# Patient Record
Sex: MALE | Race: OTHER | NOT HISPANIC OR LATINO | URBAN - METROPOLITAN AREA
[De-identification: names, ages, dates, MRNs, and addresses within clinical notes are randomized per-mention and may not be internally consistent; named-entity substitution may affect disease eponyms.]

---

## 2017-01-09 ENCOUNTER — EMERGENCY (EMERGENCY)
Facility: HOSPITAL | Age: 77
LOS: 1 days | Discharge: HOME | End: 2017-01-09
Attending: EMERGENCY MEDICINE | Admitting: EMERGENCY MEDICINE
Payer: MEDICARE

## 2017-01-09 VITALS
TEMPERATURE: 98 F | OXYGEN SATURATION: 99 % | SYSTOLIC BLOOD PRESSURE: 171 MMHG | HEART RATE: 82 BPM | RESPIRATION RATE: 16 BRPM | DIASTOLIC BLOOD PRESSURE: 101 MMHG

## 2017-01-09 LAB
ALBUMIN SERPL ELPH-MCNC: 4.1 G/DL — SIGNIFICANT CHANGE UP (ref 3.3–5)
ALP SERPL-CCNC: 58 U/L — SIGNIFICANT CHANGE UP (ref 40–120)
ALT FLD-CCNC: 32 U/L — SIGNIFICANT CHANGE UP (ref 4–41)
AST SERPL-CCNC: 25 U/L — SIGNIFICANT CHANGE UP (ref 4–40)
BASE EXCESS BLDV CALC-SCNC: 3.4 MMOL/L — SIGNIFICANT CHANGE UP
BASOPHILS # BLD AUTO: 0.02 K/UL — SIGNIFICANT CHANGE UP (ref 0–0.2)
BASOPHILS NFR BLD AUTO: 0.2 % — SIGNIFICANT CHANGE UP (ref 0–2)
BILIRUB SERPL-MCNC: 0.6 MG/DL — SIGNIFICANT CHANGE UP (ref 0.2–1.2)
BLOOD GAS VENOUS - CREATININE: 1.59 MG/DL — HIGH (ref 0.5–1.3)
BUN SERPL-MCNC: 22 MG/DL — SIGNIFICANT CHANGE UP (ref 7–23)
CALCIUM SERPL-MCNC: 9.6 MG/DL — SIGNIFICANT CHANGE UP (ref 8.4–10.5)
CHLORIDE BLDV-SCNC: 103 MMOL/L — SIGNIFICANT CHANGE UP (ref 96–108)
CHLORIDE SERPL-SCNC: 100 MMOL/L — SIGNIFICANT CHANGE UP (ref 98–107)
CO2 SERPL-SCNC: 24 MMOL/L — SIGNIFICANT CHANGE UP (ref 22–31)
CREAT SERPL-MCNC: 1.56 MG/DL — HIGH (ref 0.5–1.3)
EOSINOPHIL # BLD AUTO: 0.08 K/UL — SIGNIFICANT CHANGE UP (ref 0–0.5)
EOSINOPHIL NFR BLD AUTO: 0.7 % — SIGNIFICANT CHANGE UP (ref 0–6)
GAS PNL BLDV: 136 MMOL/L — SIGNIFICANT CHANGE UP (ref 136–146)
GLUCOSE BLDV-MCNC: 79 — SIGNIFICANT CHANGE UP (ref 70–99)
GLUCOSE SERPL-MCNC: 83 MG/DL — SIGNIFICANT CHANGE UP (ref 70–99)
HBA1C BLD-MCNC: 6.1 % — HIGH (ref 4–5.6)
HCO3 BLDV-SCNC: 25 MMOL/L — SIGNIFICANT CHANGE UP (ref 20–27)
HCT VFR BLD CALC: 41.8 % — SIGNIFICANT CHANGE UP (ref 39–50)
HCT VFR BLDV CALC: 44.7 % — SIGNIFICANT CHANGE UP (ref 39–51)
HGB BLD-MCNC: 13.6 G/DL — SIGNIFICANT CHANGE UP (ref 13–17)
HGB BLDV-MCNC: 14.6 G/DL — SIGNIFICANT CHANGE UP (ref 13–17)
IMM GRANULOCYTES NFR BLD AUTO: 0.3 % — SIGNIFICANT CHANGE UP (ref 0–1.5)
LACTATE BLDV-MCNC: 1.3 MMOL/L — SIGNIFICANT CHANGE UP (ref 0.5–2)
LYMPHOCYTES # BLD AUTO: 1.68 K/UL — SIGNIFICANT CHANGE UP (ref 1–3.3)
LYMPHOCYTES # BLD AUTO: 14.4 % — SIGNIFICANT CHANGE UP (ref 13–44)
MCHC RBC-ENTMCNC: 29.5 PG — SIGNIFICANT CHANGE UP (ref 27–34)
MCHC RBC-ENTMCNC: 32.5 % — SIGNIFICANT CHANGE UP (ref 32–36)
MCV RBC AUTO: 90.7 FL — SIGNIFICANT CHANGE UP (ref 80–100)
MONOCYTES # BLD AUTO: 1.35 K/UL — HIGH (ref 0–0.9)
MONOCYTES NFR BLD AUTO: 11.6 % — SIGNIFICANT CHANGE UP (ref 2–14)
NEUTROPHILS # BLD AUTO: 8.48 K/UL — HIGH (ref 1.8–7.4)
NEUTROPHILS NFR BLD AUTO: 72.8 % — SIGNIFICANT CHANGE UP (ref 43–77)
PCO2 BLDV: 54 MMHG — HIGH (ref 41–51)
PH BLDV: 7.35 PH — SIGNIFICANT CHANGE UP (ref 7.32–7.43)
PLATELET # BLD AUTO: 217 K/UL — SIGNIFICANT CHANGE UP (ref 150–400)
PMV BLD: 10.7 FL — SIGNIFICANT CHANGE UP (ref 7–13)
PO2 BLDV: 35 MMHG — SIGNIFICANT CHANGE UP (ref 35–40)
POTASSIUM BLDV-SCNC: 4.5 MMOL/L — SIGNIFICANT CHANGE UP (ref 3.4–4.5)
POTASSIUM SERPL-MCNC: 4.8 MMOL/L — SIGNIFICANT CHANGE UP (ref 3.5–5.3)
POTASSIUM SERPL-SCNC: 4.8 MMOL/L — SIGNIFICANT CHANGE UP (ref 3.5–5.3)
PROT SERPL-MCNC: 7.9 G/DL — SIGNIFICANT CHANGE UP (ref 6–8.3)
RBC # BLD: 4.61 M/UL — SIGNIFICANT CHANGE UP (ref 4.2–5.8)
RBC # FLD: 12.6 % — SIGNIFICANT CHANGE UP (ref 10.3–14.5)
SAO2 % BLDV: 57.5 % — LOW (ref 60–85)
SODIUM SERPL-SCNC: 139 MMOL/L — SIGNIFICANT CHANGE UP (ref 135–145)
WBC # BLD: 11.65 K/UL — HIGH (ref 3.8–10.5)
WBC # FLD AUTO: 11.65 K/UL — HIGH (ref 3.8–10.5)

## 2017-01-09 PROCEDURE — 71020: CPT | Mod: 26

## 2017-01-09 PROCEDURE — 99217: CPT | Mod: GC

## 2017-01-09 PROCEDURE — 99218: CPT | Mod: GC

## 2017-01-09 RX ORDER — GABAPENTIN 400 MG/1
100 CAPSULE ORAL AT BEDTIME
Qty: 0 | Refills: 0 | Status: DISCONTINUED | OUTPATIENT
Start: 2017-01-09 | End: 2017-01-13

## 2017-01-09 RX ORDER — ASPIRIN AND DIPYRIDAMOLE 25; 200 MG/1; MG/1
1 CAPSULE, EXTENDED RELEASE ORAL DAILY
Qty: 0 | Refills: 0 | Status: DISCONTINUED | OUTPATIENT
Start: 2017-01-09 | End: 2017-01-13

## 2017-01-09 RX ORDER — ACETAMINOPHEN 500 MG
650 TABLET ORAL ONCE
Qty: 0 | Refills: 0 | Status: COMPLETED | OUTPATIENT
Start: 2017-01-09 | End: 2017-01-09

## 2017-01-09 RX ORDER — SODIUM CHLORIDE 9 MG/ML
1000 INJECTION INTRAMUSCULAR; INTRAVENOUS; SUBCUTANEOUS ONCE
Qty: 0 | Refills: 0 | Status: COMPLETED | OUTPATIENT
Start: 2017-01-09 | End: 2017-01-09

## 2017-01-09 RX ORDER — CEFTRIAXONE 500 MG/1
1 INJECTION, POWDER, FOR SOLUTION INTRAMUSCULAR; INTRAVENOUS ONCE
Qty: 0 | Refills: 0 | Status: COMPLETED | OUTPATIENT
Start: 2017-01-09 | End: 2017-01-09

## 2017-01-09 RX ORDER — ALBUTEROL 90 UG/1
2.5 AEROSOL, METERED ORAL EVERY 6 HOURS
Qty: 0 | Refills: 0 | Status: DISCONTINUED | OUTPATIENT
Start: 2017-01-09 | End: 2017-01-13

## 2017-01-09 RX ORDER — BUDESONIDE AND FORMOTEROL FUMARATE DIHYDRATE 160; 4.5 UG/1; UG/1
1 AEROSOL RESPIRATORY (INHALATION)
Qty: 0 | Refills: 0 | Status: DISCONTINUED | OUTPATIENT
Start: 2017-01-09 | End: 2017-01-13

## 2017-01-09 RX ORDER — KETOCONAZOLE 20 MG/G
1 AEROSOL, FOAM TOPICAL AT BEDTIME
Qty: 0 | Refills: 0 | Status: DISCONTINUED | OUTPATIENT
Start: 2017-01-09 | End: 2017-01-13

## 2017-01-09 RX ORDER — TAMSULOSIN HYDROCHLORIDE 0.4 MG/1
0.4 CAPSULE ORAL AT BEDTIME
Qty: 0 | Refills: 0 | Status: DISCONTINUED | OUTPATIENT
Start: 2017-01-09 | End: 2017-01-13

## 2017-01-09 RX ORDER — AZITHROMYCIN 500 MG/1
500 TABLET, FILM COATED ORAL ONCE
Qty: 0 | Refills: 0 | Status: COMPLETED | OUTPATIENT
Start: 2017-01-09 | End: 2017-01-09

## 2017-01-09 RX ORDER — IPRATROPIUM/ALBUTEROL SULFATE 18-103MCG
3 AEROSOL WITH ADAPTER (GRAM) INHALATION ONCE
Qty: 0 | Refills: 0 | Status: COMPLETED | OUTPATIENT
Start: 2017-01-09 | End: 2017-01-09

## 2017-01-09 RX ADMIN — Medication 650 MILLIGRAM(S): at 21:45

## 2017-01-09 RX ADMIN — Medication 3 MILLILITER(S): at 12:05

## 2017-01-09 RX ADMIN — SODIUM CHLORIDE 1000 MILLILITER(S): 9 INJECTION INTRAMUSCULAR; INTRAVENOUS; SUBCUTANEOUS at 20:30

## 2017-01-09 RX ADMIN — BUDESONIDE AND FORMOTEROL FUMARATE DIHYDRATE 1 PUFF(S): 160; 4.5 AEROSOL RESPIRATORY (INHALATION) at 21:45

## 2017-01-09 RX ADMIN — TAMSULOSIN HYDROCHLORIDE 0.4 MILLIGRAM(S): 0.4 CAPSULE ORAL at 21:45

## 2017-01-09 RX ADMIN — Medication 3 MILLILITER(S): at 08:37

## 2017-01-09 RX ADMIN — Medication 60 MILLIGRAM(S): at 08:59

## 2017-01-09 RX ADMIN — GABAPENTIN 100 MILLIGRAM(S): 400 CAPSULE ORAL at 21:45

## 2017-01-09 RX ADMIN — Medication 10 MILLIGRAM(S): at 17:53

## 2017-01-09 RX ADMIN — ASPIRIN AND DIPYRIDAMOLE 1 CAPSULE(S): 25; 200 CAPSULE, EXTENDED RELEASE ORAL at 17:53

## 2017-01-09 RX ADMIN — AZITHROMYCIN 250 MILLIGRAM(S): 500 TABLET, FILM COATED ORAL at 10:08

## 2017-01-09 RX ADMIN — CEFTRIAXONE 100 GRAM(S): 500 INJECTION, POWDER, FOR SOLUTION INTRAMUSCULAR; INTRAVENOUS at 09:53

## 2017-01-09 RX ADMIN — ALBUTEROL 2.5 MILLIGRAM(S): 90 AEROSOL, METERED ORAL at 17:53

## 2017-01-09 NOTE — ED CDU PROVIDER NOTE - MEDICAL DECISION MAKING DETAILS
77 yo recurrnt coughing copius sputum, no obvious pna on cxr (dw radiologist dR Brennan), but given age, stiven cdu for steroids, abx and then close fu with pmd 77 yo recurrent coughing copious sputum, no obvious pna on cxr (dw radiologist dR Brennan), but given age, stiven cdu for steroids, abx and then close fu with pmd

## 2017-01-09 NOTE — ED ADULT NURSE NOTE - OBJECTIVE STATEMENT
pt received a&ox3, pt c/o productive cough with chest pain on cough, pt states he has hx of asthma and that symptoms feel like usual asthma, states he has an inhaler but symptoms were not relieved by inhaler use, pt appears to be in NAD, vss sating at 99% on room air, pt medicated as per emr, 20 gauge IV placed in left ac ,labs drawn and sent, will continue to monitor. pt received a&ox3, pt c/o productive cough with chest discomfort on cough, pt states he has hx of asthma and that symptoms feel like usual asthma, states he has an inhaler but symptoms were not relieved by inhaler use, pt appears to be in NAD, vss sating at 99% on room air, pt medicated as per emr, 20 gauge IV placed in left ac ,labs drawn and sent, will continue to monitor.

## 2017-01-09 NOTE — ED CDU PROVIDER NOTE - PROGRESS NOTE DETAILS
CDU CANDY Ocampo Progress Note:  Pt endorses improvement in symptoms.  Ambulating in unit without issues.  Pt to continue with neb treatments, steroid, abx.  Pt to be reassessed in AM. CANDY Thompson  Pt  lying in bed comfortably states he had right rib pain. Pain medication ordered.  Pt lung CTA B/L . Will continue to monitor. CANDY Thompson  Pt sleeping comfortable and in NAD. Will continue to monitor. CDU DC note Uche: Pt. states feels better, taking Symbicort with minimal relief and states was improved on Prednisone, took 3d course. Will add albuterol with spacer to regimen and resume steroid course. Pt. NAD this a.m., lungs clear, ambulating easily. No PNA but was begun on abx yest. with plan to cont. Zpack at home. States no hx of prev. PNA but does have inhaler use periodically, more commonly in winter season. Denies tob use. + sputum production. Unclear if COPD physiology, will cont. Zpack.

## 2017-01-09 NOTE — ED PROVIDER NOTE - OBJECTIVE STATEMENT
76 year old male with past medical history of Asthma, Hypertension, presents to the Emergency Department complaining of productive cough of brown sputum. Patient states he's had persistent cough for 3 weeks. Patient presented on Dec 26th to the Emergency Department where he was given DuoNebs and steroids. Improvement with treatment for 3 days but had recurrent cough. Endorses shortness of breath that is consistent with prior asthma, fever at home, and chest pain that is worse with coughing (no substernal chest pain that is radiating or exertionally related). No pulmonary embolism risk factors. Denies edema, diaphoresis, fatigue, lightheadedness, nausea, vomiting, orthopnea, or any other complaints. No smoking history. 76 year old male with past medical history of Asthma, Hypertension, presents to the Emergency Department complaining of productive cough of brown sputum, x 3 weeks. Patient was seen in the ER on 12/26,tretaed for asthma and discharge.  He improved for 3 days and started coughing again.  . He co fever at home and has collected copious white/yellow sputum while in the ED.  He denies chest pain, pulmonary embolism risk factors, edema, diaphoresis, fatigue, lightheadedness, nausea, vomiting, orthopnea, or any other complaints. No smoking history.

## 2017-01-09 NOTE — ED PROVIDER NOTE - NS ED MD SCRIBE ATTENDING SCRIBE SECTIONS
HIV/PAST MEDICAL/SURGICAL/SOCIAL HISTORY/DISPOSITION/REVIEW OF SYSTEMS/VITAL SIGNS( Pullset)/HISTORY OF PRESENT ILLNESS/PHYSICAL EXAM

## 2017-01-09 NOTE — ED PROVIDER NOTE - MEDICAL DECISION MAKING DETAILS
76 year old male presents with likely asthma and bronchitis, but will rule out  Pneumonia. Patient likely to be d/c home given stable vital signs and unremarkable physical exam. However, given that today is patient's 2nd visit for similar symptoms, will offer CDU observation. 76 year old male presents with likely asthma and bronchitis, but will rule out  Pneumonia. Patient likely to be d/c home given stable vital signs and unremarkable physical exam. However, given that today is patient's 2nd visit for similar symptoms, will offer CDU observation, duonebs, steroids, possibly dc later today

## 2017-01-09 NOTE — ED ADULT NURSE NOTE - CHIEF COMPLAINT QUOTE
Pt brought in by [facility] c/o a productive cough and brownish phlegm. Pt reports chest discomfort with cough. pt states he was seen here for the same last week. Hx of asthma

## 2017-01-09 NOTE — ED CDU PROVIDER NOTE - ATTENDING CONTRIBUTION TO CARE
I performed the initial face to face bedside interview with this patient regarding history of present illness, review of symptoms and past medical, social and family history and independent physical examination alongside the physician assistant resident.  I wrote the chart and discussed the case, impression, and plan with the resident and patient.

## 2017-01-09 NOTE — ED CDU PROVIDER NOTE - OBJECTIVE STATEMENT
76 year old male with past medical history of Asthma, Hypertension, presents to the Emergency Department complaining of productive cough of brown sputum, x 3 weeks. Patient was seen in the ER on 12/26,tretaed for asthma and discharge.  He improved for 3 days and started coughing again.  . He co fever at home and has collected copious white/yellow sputum while in the ED.  He denies chest pain, pulmonary embolism risk factors, edema, diaphoresis, fatigue, lightheadedness, nausea, vomiting, orthopnea, or any other complaints. No smoking history.

## 2017-01-09 NOTE — ED ADULT TRIAGE NOTE - CHIEF COMPLAINT QUOTE
Pt brought in by [facility] c/o a productive cough and brownish phlegm. Pt reports chest discomfort with cough. pt states he was seen here for the same last week. Hx of asthma

## 2017-01-09 NOTE — ED CDU PROVIDER NOTE - PLAN OF CARE
f/u with PMD post hospital visit within hrs taking all results from the ER to be reviewed. Continue Symbicort as directed as well as Azithromycin 250mg, 1 tab daily for three more days starting tomorrow. In addition, complete Prednisone 50mg once daily for five more days starting tomorrow. If needed for cough/brochospasms, take the albuterol inhaler 2 puffs every 4-6hrs. If any persistent fever, chills, shortness of breath, persistent worsening concerning or new signs or symptoms return to the ER. Follow up with your  PMD post hospital visit within hrs taking all results from the ER to be reviewed. Continue Symbicort as directed as well as Azithromycin 250mg, 1 tab daily for three more days starting tomorrow. In addition, complete Prednisone 50mg once daily for five more days starting tomorrow. If needed for cough/brochospasms, take the albuterol inhaler 2 puffs every 4-6hrs. If any persistent fever, chills, shortness of breath, persistent worsening concerning or new signs or symptoms return to the ER.

## 2017-01-10 VITALS
RESPIRATION RATE: 18 BRPM | HEART RATE: 79 BPM | DIASTOLIC BLOOD PRESSURE: 67 MMHG | OXYGEN SATURATION: 96 % | SYSTOLIC BLOOD PRESSURE: 118 MMHG | TEMPERATURE: 98 F

## 2017-01-10 LAB
SPECIMEN SOURCE: SIGNIFICANT CHANGE UP
SPECIMEN SOURCE: SIGNIFICANT CHANGE UP

## 2017-01-10 RX ORDER — ALBUTEROL 90 UG/1
1 AEROSOL, METERED ORAL ONCE
Qty: 0 | Refills: 0 | Status: COMPLETED | OUTPATIENT
Start: 2017-01-10 | End: 2017-01-10

## 2017-01-10 RX ORDER — AZITHROMYCIN 500 MG/1
250 TABLET, FILM COATED ORAL ONCE
Qty: 0 | Refills: 0 | Status: COMPLETED | OUTPATIENT
Start: 2017-01-10 | End: 2017-01-10

## 2017-01-10 RX ORDER — AZITHROMYCIN 500 MG/1
250 TABLET, FILM COATED ORAL
Qty: 3 | Refills: 0 | OUTPATIENT
Start: 2017-01-10 | End: 2017-01-13

## 2017-01-10 RX ADMIN — ALBUTEROL 2.5 MILLIGRAM(S): 90 AEROSOL, METERED ORAL at 06:08

## 2017-01-10 RX ADMIN — Medication 10 MILLIGRAM(S): at 06:08

## 2017-01-10 RX ADMIN — AZITHROMYCIN 250 MILLIGRAM(S): 500 TABLET, FILM COATED ORAL at 09:56

## 2017-01-10 RX ADMIN — ALBUTEROL 1 PUFF(S): 90 AEROSOL, METERED ORAL at 10:11

## 2017-01-10 RX ADMIN — Medication 60 MILLIGRAM(S): at 09:55

## 2017-01-10 RX ADMIN — ALBUTEROL 2.5 MILLIGRAM(S): 90 AEROSOL, METERED ORAL at 00:08

## 2017-01-13 ENCOUNTER — APPOINTMENT (OUTPATIENT)
Dept: PULMONOLOGY | Facility: CLINIC | Age: 77
End: 2017-01-13

## 2017-01-13 VITALS
OXYGEN SATURATION: 95 % | BODY MASS INDEX: 28.28 KG/M2 | WEIGHT: 202 LBS | HEART RATE: 77 BPM | SYSTOLIC BLOOD PRESSURE: 136 MMHG | HEIGHT: 71 IN | DIASTOLIC BLOOD PRESSURE: 80 MMHG

## 2017-01-13 DIAGNOSIS — R06.02 SHORTNESS OF BREATH: ICD-10-CM

## 2017-01-13 DIAGNOSIS — N40.0 BENIGN PROSTATIC HYPERPLASIA WITHOUT LOWER URINARY TRACT SYMPMS: ICD-10-CM

## 2017-01-13 DIAGNOSIS — Z86.79 PERSONAL HISTORY OF OTHER DISEASES OF THE CIRCULATORY SYSTEM: ICD-10-CM

## 2017-01-14 LAB
BACTERIA BLD CULT: SIGNIFICANT CHANGE UP
BACTERIA BLD CULT: SIGNIFICANT CHANGE UP

## 2017-01-16 PROBLEM — R06.02 SHORTNESS OF BREATH: Status: ACTIVE | Noted: 2017-01-13

## 2017-01-16 PROBLEM — N40.0 ENLARGED PROSTATE: Status: RESOLVED | Noted: 2017-01-16 | Resolved: 2017-01-16

## 2017-01-16 PROBLEM — Z86.79 HISTORY OF HYPERTENSION: Status: RESOLVED | Noted: 2017-01-13 | Resolved: 2017-01-16

## 2017-01-16 RX ORDER — ENALAPRIL MALEATE 10 MG/1
10 TABLET ORAL
Qty: 180 | Refills: 0 | Status: ACTIVE | COMMUNITY
Start: 2016-08-15

## 2017-01-16 RX ORDER — PREDNISONE 50 MG/1
50 TABLET ORAL
Qty: 5 | Refills: 0 | Status: DISCONTINUED | COMMUNITY
Start: 2017-01-10 | End: 2017-01-16

## 2017-01-16 RX ORDER — ALBUTEROL SULFATE 90 UG/1
108 (90 BASE) AEROSOL, METERED RESPIRATORY (INHALATION)
Qty: 18 | Refills: 0 | Status: ACTIVE | COMMUNITY
Start: 2016-12-23

## 2017-01-16 RX ORDER — AZITHROMYCIN 250 MG/1
250 TABLET, FILM COATED ORAL
Qty: 3 | Refills: 0 | Status: ACTIVE | COMMUNITY
Start: 2017-01-10

## 2017-01-16 RX ORDER — ENALAPRIL MALEATE 20 MG/1
20 TABLET ORAL
Qty: 180 | Refills: 0 | Status: COMPLETED | COMMUNITY
Start: 2016-12-22

## 2017-01-16 RX ORDER — FLUTICASONE PROPIONATE 0.5 MG/G
0.05 CREAM TOPICAL
Qty: 60 | Refills: 0 | Status: ACTIVE | COMMUNITY
Start: 2016-07-23

## 2017-01-16 RX ORDER — TAMSULOSIN HYDROCHLORIDE 0.4 MG/1
0.4 CAPSULE ORAL
Qty: 90 | Refills: 0 | Status: ACTIVE | COMMUNITY
Start: 2016-12-29

## 2017-01-16 RX ORDER — KETOCONAZOLE 20 MG/G
2 CREAM TOPICAL
Qty: 30 | Refills: 0 | Status: COMPLETED | COMMUNITY
Start: 2016-12-01

## 2017-01-16 RX ORDER — PREDNISONE 20 MG/1
20 TABLET ORAL
Qty: 8 | Refills: 0 | Status: COMPLETED | COMMUNITY
Start: 2016-12-26

## 2017-01-16 RX ORDER — AMMONIUM LACTATE 12 %
12 CREAM (GRAM) TOPICAL
Qty: 140 | Refills: 0 | Status: COMPLETED | COMMUNITY
Start: 2016-11-07

## 2017-01-16 RX ORDER — ATORVASTATIN CALCIUM 40 MG/1
40 TABLET, FILM COATED ORAL
Qty: 90 | Refills: 0 | Status: ACTIVE | COMMUNITY
Start: 2016-12-22

## 2017-01-16 RX ORDER — FLUTICASONE PROPIONATE AND SALMETEROL 50; 500 UG/1; UG/1
500-50 POWDER RESPIRATORY (INHALATION)
Qty: 60 | Refills: 0 | Status: ACTIVE | COMMUNITY
Start: 2016-12-22

## 2017-01-16 RX ORDER — GABAPENTIN 100 MG/1
100 CAPSULE ORAL
Qty: 30 | Refills: 0 | Status: ACTIVE | COMMUNITY
Start: 2016-12-29

## 2017-04-21 ENCOUNTER — APPOINTMENT (OUTPATIENT)
Dept: PULMONOLOGY | Facility: CLINIC | Age: 77
End: 2017-04-21

## 2017-04-21 VITALS
HEART RATE: 75 BPM | OXYGEN SATURATION: 96 % | HEIGHT: 71 IN | DIASTOLIC BLOOD PRESSURE: 80 MMHG | WEIGHT: 214 LBS | BODY MASS INDEX: 29.96 KG/M2 | SYSTOLIC BLOOD PRESSURE: 136 MMHG

## 2017-04-21 RX ORDER — ALBUTEROL SULFATE 108 UG/1
108 (90 BASE) AEROSOL, METERED RESPIRATORY (INHALATION) EVERY 6 HOURS
Qty: 1 | Refills: 3 | Status: ACTIVE | COMMUNITY
Start: 2017-01-13 | End: 1900-01-01

## 2017-08-17 ENCOUNTER — APPOINTMENT (OUTPATIENT)
Dept: PULMONOLOGY | Facility: CLINIC | Age: 77
End: 2017-08-17
Payer: MEDICARE

## 2017-08-17 VITALS
DIASTOLIC BLOOD PRESSURE: 70 MMHG | BODY MASS INDEX: 29.68 KG/M2 | HEART RATE: 68 BPM | WEIGHT: 212 LBS | HEIGHT: 71 IN | OXYGEN SATURATION: 99 % | SYSTOLIC BLOOD PRESSURE: 130 MMHG

## 2017-08-17 PROCEDURE — 99213 OFFICE O/P EST LOW 20 MIN: CPT

## 2017-08-21 RX ORDER — FLUTICASONE PROPIONATE 50 UG/1
50 SPRAY, METERED NASAL
Qty: 16 | Refills: 0 | Status: ACTIVE | COMMUNITY
Start: 2017-05-23

## 2017-08-21 RX ORDER — MONTELUKAST 10 MG/1
10 TABLET, FILM COATED ORAL
Qty: 90 | Refills: 0 | Status: ACTIVE | COMMUNITY
Start: 2017-07-20

## 2017-08-21 RX ORDER — FLUTICASONE PROPIONATE AND SALMETEROL 50; 250 UG/1; UG/1
250-50 POWDER RESPIRATORY (INHALATION)
Refills: 0 | Status: ACTIVE | COMMUNITY
Start: 2017-08-21

## 2017-08-21 RX ORDER — GABAPENTIN 300 MG/1
300 CAPSULE ORAL
Qty: 90 | Refills: 0 | Status: ACTIVE | COMMUNITY
Start: 2017-07-25

## 2017-08-21 RX ORDER — NEOMYCIN SULFATE, POLYMYXIN B SULFATE AND DEXAMETHASONE 3.5; 10000; 1 MG/ML; [USP'U]/ML; MG/ML
3.5-10000-0.1 SUSPENSION OPHTHALMIC
Qty: 15 | Refills: 0 | Status: ACTIVE | COMMUNITY
Start: 2017-04-19

## 2017-08-29 ENCOUNTER — OUTPATIENT (OUTPATIENT)
Dept: OUTPATIENT SERVICES | Facility: HOSPITAL | Age: 77
LOS: 1 days | End: 2017-08-29

## 2017-08-29 DIAGNOSIS — Z01.20 ENCOUNTER FOR DENTAL EXAMINATION AND CLEANING WITHOUT ABNORMAL FINDINGS: ICD-10-CM

## 2017-09-13 ENCOUNTER — OUTPATIENT (OUTPATIENT)
Dept: OUTPATIENT SERVICES | Facility: HOSPITAL | Age: 77
LOS: 1 days | End: 2017-09-13

## 2017-09-15 DIAGNOSIS — Z01.20 ENCOUNTER FOR DENTAL EXAMINATION AND CLEANING WITHOUT ABNORMAL FINDINGS: ICD-10-CM

## 2018-01-01 ENCOUNTER — OUTPATIENT (OUTPATIENT)
Dept: OUTPATIENT SERVICES | Facility: HOSPITAL | Age: 78
LOS: 1 days | End: 2018-01-01
Payer: MEDICAID

## 2018-01-01 PROCEDURE — G9001: CPT

## 2018-01-22 ENCOUNTER — EMERGENCY (EMERGENCY)
Facility: HOSPITAL | Age: 78
LOS: 1 days | Discharge: HOME | End: 2018-01-22
Attending: EMERGENCY MEDICINE | Admitting: EMERGENCY MEDICINE
Payer: MEDICARE

## 2018-01-22 VITALS
TEMPERATURE: 98 F | RESPIRATION RATE: 16 BRPM | SYSTOLIC BLOOD PRESSURE: 144 MMHG | HEART RATE: 82 BPM | OXYGEN SATURATION: 98 % | DIASTOLIC BLOOD PRESSURE: 85 MMHG

## 2018-01-22 VITALS
SYSTOLIC BLOOD PRESSURE: 124 MMHG | TEMPERATURE: 98 F | HEART RATE: 60 BPM | OXYGEN SATURATION: 97 % | DIASTOLIC BLOOD PRESSURE: 69 MMHG | RESPIRATION RATE: 16 BRPM

## 2018-01-22 PROCEDURE — 99283 EMERGENCY DEPT VISIT LOW MDM: CPT

## 2018-01-22 RX ORDER — DIPHENHYDRAMINE HCL 50 MG
50 CAPSULE ORAL ONCE
Qty: 0 | Refills: 0 | Status: COMPLETED | OUTPATIENT
Start: 2018-01-22 | End: 2018-01-22

## 2018-01-22 RX ADMIN — Medication 50 MILLIGRAM(S): at 11:17

## 2018-01-22 NOTE — ED PROVIDER NOTE - MEDICAL DECISION MAKING DETAILS
78yo M pmh as above here for pruritic rash X approx 3wk. H&P most consistent w/ drug rash. Pt stopped one of his meds in Dec, but is unsure which medication he stopped. Received his prescription med hx (on printed sheets provided by pt). It appears he was started on topical ketoconazole and topical triamcinolone in mid December. Will DC both of these meds until further investigation by his podiatrist and dermatology f/u. No reason to suspect anaphylaxis at this time. Benadryl , prednisone, and reassess.

## 2018-01-22 NOTE — ED ADULT TRIAGE NOTE - CHIEF COMPLAINT QUOTE
Pt c/o generalized rash and itching to body. Pt denies any allergies. Pt has seen MD and no relief with medication. Denies any tongue swelling or throat itching.

## 2018-01-22 NOTE — ED PROVIDER NOTE - PROGRESS NOTE DETAILS
Reassessed pt, rash and pruritis much improved, will dc home and pt will stop the meds he was instructed to dc, and f/u with his pmd/podiatrist/ and dermatology.  -Dr. Mckeon

## 2018-01-22 NOTE — ED PROVIDER NOTE - OBJECTIVE STATEMENT
Pertinent PMH/PSH/FHx/SHx and Review of Systems contained within:  78yo M pmh inclusive of htn, hcl, DM, CVA, and tinea pedis, presents c/o whole body pruritic rash X approx 3wks. States he was started on new topical medications by his pmd and his podiatrist in December, not sure of names of meds, and shortly after developed erythematous rash and hives which have been gradually worsening. Pt stopped one medication (he is not sure which medication he stopped), but states symptoms continue to worsen. Some improvement with topical benadryl a few days ago, but symptoms returned after about an hour. Denies any known prior allergies to food/meds/skin products. Denies use of any new skin productions or laundry detergents. Denies sob, oral/tongue itching or swelling, or abd pain. No fever/chills, No photophobia/eye pain/changes in vision, No ear pain/sore throat/dysphagia, No chest pain/palpitations, no SOB/cough/wheeze/stridor, No abdominal pain, No N/V/D, no dysuria/frequency/discharge, No neck/back pain, no changes in neurological status/function.

## 2018-01-22 NOTE — ED PROVIDER NOTE - PHYSICAL EXAMINATION
Gen: Alert, mild distress  Head: NC, AT,  EOMI, normal lids/conjunctiva  ENT: normal hearing, patent oropharynx without erythema/exudate, uvula midline  Neck: +supple, no tenderness/meningismus/JVD, +Trachea midline  Chest: no chest wall tenderness, equal chest rise  Pulm: Bilateral BS, normal resp effort, no wheeze/stridor/retractions  CV: RRR, no M/R/G, +dist pulses  Abd: soft, NT/ND, +BS, no hepatosplenomegaly  Rectal: deferred  Mskel: no edema/erythema/cyanosis  Skin: diffuse, whole body erythematous macular blanching rash ranching from 1cm circular areas to coalesced 8cm patches, and scattered hives, some excoriations from scratching at areas, +tinea pedia b/l.  Neuro: AAOx3

## 2018-01-23 DIAGNOSIS — R69 ILLNESS, UNSPECIFIED: ICD-10-CM

## 2018-01-26 ENCOUNTER — APPOINTMENT (OUTPATIENT)
Dept: DERMATOLOGY | Facility: CLINIC | Age: 78
End: 2018-01-26
Payer: MEDICARE

## 2018-01-26 VITALS
WEIGHT: 214 LBS | DIASTOLIC BLOOD PRESSURE: 80 MMHG | SYSTOLIC BLOOD PRESSURE: 154 MMHG | BODY MASS INDEX: 29.96 KG/M2 | HEIGHT: 71 IN

## 2018-01-26 DIAGNOSIS — Z87.09 PERSONAL HISTORY OF OTHER DISEASES OF THE RESPIRATORY SYSTEM: ICD-10-CM

## 2018-01-26 DIAGNOSIS — L85.3 XEROSIS CUTIS: ICD-10-CM

## 2018-01-26 DIAGNOSIS — Z91.89 OTHER SPECIFIED PERSONAL RISK FACTORS, NOT ELSEWHERE CLASSIFIED: ICD-10-CM

## 2018-01-26 DIAGNOSIS — Z84.0 FAMILY HISTORY OF DISEASES OF THE SKIN AND SUBCUTANEOUS TISSUE: ICD-10-CM

## 2018-01-26 PROCEDURE — 99203 OFFICE O/P NEW LOW 30 MIN: CPT | Mod: GC

## 2018-01-26 RX ORDER — HYDROXYZINE HYDROCHLORIDE 10 MG/1
10 TABLET ORAL
Qty: 1 | Refills: 0 | Status: ACTIVE | COMMUNITY
Start: 2018-01-26 | End: 1900-01-01

## 2018-01-26 RX ORDER — PREDNISONE 50 MG/1
50 TABLET ORAL
Refills: 0 | Status: ACTIVE | COMMUNITY

## 2018-02-09 ENCOUNTER — APPOINTMENT (OUTPATIENT)
Dept: DERMATOLOGY | Facility: CLINIC | Age: 78
End: 2018-02-09
Payer: MEDICARE

## 2018-02-09 VITALS — SYSTOLIC BLOOD PRESSURE: 130 MMHG | DIASTOLIC BLOOD PRESSURE: 72 MMHG

## 2018-02-09 PROCEDURE — 99213 OFFICE O/P EST LOW 20 MIN: CPT | Mod: GC

## 2018-02-15 ENCOUNTER — APPOINTMENT (OUTPATIENT)
Dept: PULMONOLOGY | Facility: CLINIC | Age: 78
End: 2018-02-15
Payer: MEDICARE

## 2018-02-15 ENCOUNTER — OTHER (OUTPATIENT)
Age: 78
End: 2018-02-15

## 2018-02-15 VITALS
DIASTOLIC BLOOD PRESSURE: 70 MMHG | HEART RATE: 71 BPM | BODY MASS INDEX: 29.96 KG/M2 | WEIGHT: 214 LBS | SYSTOLIC BLOOD PRESSURE: 120 MMHG | OXYGEN SATURATION: 95 % | HEIGHT: 71 IN

## 2018-02-15 PROCEDURE — 99214 OFFICE O/P EST MOD 30 MIN: CPT

## 2018-03-23 ENCOUNTER — APPOINTMENT (OUTPATIENT)
Dept: DERMATOLOGY | Facility: CLINIC | Age: 78
End: 2018-03-23
Payer: MEDICARE

## 2018-03-23 VITALS — DIASTOLIC BLOOD PRESSURE: 72 MMHG | SYSTOLIC BLOOD PRESSURE: 140 MMHG

## 2018-03-23 PROCEDURE — 99213 OFFICE O/P EST LOW 20 MIN: CPT

## 2018-04-10 ENCOUNTER — APPOINTMENT (OUTPATIENT)
Dept: DERMATOLOGY | Facility: CLINIC | Age: 78
End: 2018-04-10
Payer: MEDICARE

## 2018-04-10 VITALS — SYSTOLIC BLOOD PRESSURE: 134 MMHG | DIASTOLIC BLOOD PRESSURE: 76 MMHG

## 2018-04-10 PROCEDURE — 99214 OFFICE O/P EST MOD 30 MIN: CPT

## 2018-05-01 ENCOUNTER — OUTPATIENT (OUTPATIENT)
Dept: OUTPATIENT SERVICES | Facility: HOSPITAL | Age: 78
LOS: 1 days | End: 2018-05-01
Payer: MEDICAID

## 2018-05-03 DIAGNOSIS — R69 ILLNESS, UNSPECIFIED: ICD-10-CM

## 2018-05-21 ENCOUNTER — EMERGENCY (EMERGENCY)
Facility: HOSPITAL | Age: 78
LOS: 1 days | Discharge: HOME | End: 2018-05-21
Attending: EMERGENCY MEDICINE | Admitting: EMERGENCY MEDICINE
Payer: MEDICARE

## 2018-05-21 VITALS
OXYGEN SATURATION: 95 % | RESPIRATION RATE: 20 BRPM | HEART RATE: 62 BPM | DIASTOLIC BLOOD PRESSURE: 87 MMHG | TEMPERATURE: 98 F | SYSTOLIC BLOOD PRESSURE: 158 MMHG

## 2018-05-21 VITALS
TEMPERATURE: 98 F | HEART RATE: 64 BPM | DIASTOLIC BLOOD PRESSURE: 82 MMHG | RESPIRATION RATE: 18 BRPM | SYSTOLIC BLOOD PRESSURE: 152 MMHG | OXYGEN SATURATION: 99 %

## 2018-05-21 LAB
APPEARANCE UR: CLEAR — SIGNIFICANT CHANGE UP
BACTERIA # UR AUTO: SIGNIFICANT CHANGE UP
BILIRUB UR-MCNC: NEGATIVE — SIGNIFICANT CHANGE UP
BLOOD UR QL VISUAL: HIGH
COLOR SPEC: YELLOW — SIGNIFICANT CHANGE UP
GLUCOSE UR-MCNC: NEGATIVE — SIGNIFICANT CHANGE UP
KETONES UR-MCNC: NEGATIVE — SIGNIFICANT CHANGE UP
LEUKOCYTE ESTERASE UR-ACNC: HIGH
MUCOUS THREADS # UR AUTO: SIGNIFICANT CHANGE UP
NITRITE UR-MCNC: NEGATIVE — SIGNIFICANT CHANGE UP
PH UR: 6.5 — SIGNIFICANT CHANGE UP (ref 4.6–8)
PROT UR-MCNC: 30 MG/DL — HIGH
RBC CASTS # UR COMP ASSIST: SIGNIFICANT CHANGE UP (ref 0–?)
SP GR SPEC: 1.01 — SIGNIFICANT CHANGE UP (ref 1–1.04)
UROBILINOGEN FLD QL: 0.2 MG/DL — SIGNIFICANT CHANGE UP
WBC UR QL: >50 — HIGH (ref 0–?)

## 2018-05-21 PROCEDURE — 99283 EMERGENCY DEPT VISIT LOW MDM: CPT | Mod: 25,GC

## 2018-05-21 NOTE — ED PROVIDER NOTE - ATTENDING CONTRIBUTION TO CARE
Dr. Morales: I have personally seen and examined this patient at the bedside. I have fully participated in the care of this patient. I have reviewed all pertinent clinical information, including history, physical exam, plan and the Resident's note and agree except as noted. HPI above as by me. PE above as by me. DDX Urinary retention, tavarez placed with 300cc return. PLAN dc with tavarez, f/u with uro for trial void

## 2018-05-21 NOTE — ED PROVIDER NOTE - MEDICAL DECISION MAKING DETAILS
Urinary retension, tavarez placed with 300cc return. PLAN dc with tavarez, f/u with uro for trial void

## 2018-05-21 NOTE — ED PROVIDER NOTE - PROGRESS NOTE DETAILS
Resident: patient had recent Rezum procedure with Dr. Nnamdi Kowalski [phone]. Patient has urology follow-up. will dc home with tavarez in place and instructions to follow-up with urology.

## 2018-05-21 NOTE — ED ADULT TRIAGE NOTE - CHIEF COMPLAINT QUOTE
Pt. brought to [facility] ED by EMS for difficulty urinating for 2 weeks. Had Collado removed on April 30th and has been fine until today when he had difficulty urinating. Pt. was able to urinate on the ambulance and is more comfortable now according to EMS.

## 2018-05-21 NOTE — ED ADULT NURSE NOTE - OBJECTIVE STATEMENT
Pt received in #15, aaox3 with c/o urinary retention. Pt states that he is able to void small amounts by straining but is unable to have a complete emptying of his bladder. Last void ~90 cc. Pt has required an indwelling tavarez in the past, most recently as of ~3 weeks ago. Pt denies dysuria or hematuria. 14fr tavarez cath placed under sterile technique without any complications. Output of 250cc of clear yellow urine from tavarez cath. Pt attempted to void ~30 minutes prior to placement of tavarez cath. Pt verbalizes relief of abd discomfort after tavarez placement.

## 2018-05-21 NOTE — ED ADULT NURSE NOTE - CHIEF COMPLAINT QUOTE
Pt. brought to [facility] ED by EMS for difficulty urinating for 2 weeks. Had Collado removed on April 30th and has been fine until today when he had difficulty urinating. Pt. was able to urinate on the ambulance and is more comfortable now according to EMS.

## 2018-05-21 NOTE — ED PROVIDER NOTE - OBJECTIVE STATEMENT
22:41 Andrew att: 78M p/w acute urinary retention. Two weeks ago patient had a tavarez removed. Today patient last voided this morning. Presents to ED with suprapubic distension and inability to urinate. ED PVR 170s, tavarez placed, 300cc return. Patient reports he feels great relief. Patient has a urologist he can follow with. Denies other complaints.

## 2018-05-23 LAB — SPECIMEN SOURCE: SIGNIFICANT CHANGE UP

## 2018-05-24 ENCOUNTER — INPATIENT (INPATIENT)
Facility: HOSPITAL | Age: 78
LOS: 6 days | Discharge: HOME | End: 2018-05-31
Attending: INTERNAL MEDICINE | Admitting: INTERNAL MEDICINE
Payer: MEDICARE

## 2018-05-24 VITALS
RESPIRATION RATE: 16 BRPM | TEMPERATURE: 98 F | OXYGEN SATURATION: 99 % | SYSTOLIC BLOOD PRESSURE: 158 MMHG | DIASTOLIC BLOOD PRESSURE: 75 MMHG | HEART RATE: 72 BPM

## 2018-05-24 DIAGNOSIS — A49.9 BACTERIAL INFECTION, UNSPECIFIED: ICD-10-CM

## 2018-05-24 DIAGNOSIS — N40.1 BENIGN PROSTATIC HYPERPLASIA WITH LOWER URINARY TRACT SYMPTOMS: ICD-10-CM

## 2018-05-24 DIAGNOSIS — J45.909 UNSPECIFIED ASTHMA, UNCOMPLICATED: ICD-10-CM

## 2018-05-24 DIAGNOSIS — Z29.9 ENCOUNTER FOR PROPHYLACTIC MEASURES, UNSPECIFIED: ICD-10-CM

## 2018-05-24 DIAGNOSIS — E87.5 HYPERKALEMIA: ICD-10-CM

## 2018-05-24 DIAGNOSIS — I10 ESSENTIAL (PRIMARY) HYPERTENSION: ICD-10-CM

## 2018-05-24 LAB
-  AMIKACIN: SIGNIFICANT CHANGE UP
-  AMPICILLIN/SULBACTAM: SIGNIFICANT CHANGE UP
-  AMPICILLIN: SIGNIFICANT CHANGE UP
-  AZTREONAM: SIGNIFICANT CHANGE UP
-  CEFAZOLIN: SIGNIFICANT CHANGE UP
-  CEFEPIME: SIGNIFICANT CHANGE UP
-  CEFOXITIN: SIGNIFICANT CHANGE UP
-  CEFTAZIDIME: SIGNIFICANT CHANGE UP
-  CEFTRIAXONE: SIGNIFICANT CHANGE UP
-  CIPROFLOXACIN: SIGNIFICANT CHANGE UP
-  ERTAPENEM: SIGNIFICANT CHANGE UP
-  GENTAMICIN: SIGNIFICANT CHANGE UP
-  IMIPENEM: SIGNIFICANT CHANGE UP
-  LEVOFLOXACIN: SIGNIFICANT CHANGE UP
-  MEROPENEM: SIGNIFICANT CHANGE UP
-  NITROFURANTOIN: SIGNIFICANT CHANGE UP
-  PIPERACILLIN/TAZOBACTAM: SIGNIFICANT CHANGE UP
-  TIGECYCLINE: SIGNIFICANT CHANGE UP
-  TOBRAMYCIN: SIGNIFICANT CHANGE UP
-  TRIMETHOPRIM/SULFAMETHOXAZOLE: SIGNIFICANT CHANGE UP
ALBUMIN SERPL ELPH-MCNC: 4.1 G/DL — SIGNIFICANT CHANGE UP (ref 3.3–5)
ALP SERPL-CCNC: 65 U/L — SIGNIFICANT CHANGE UP (ref 40–120)
ALT FLD-CCNC: 24 U/L — SIGNIFICANT CHANGE UP (ref 4–41)
AST SERPL-CCNC: 23 U/L — SIGNIFICANT CHANGE UP (ref 4–40)
BACTERIA UR CULT: SIGNIFICANT CHANGE UP
BASOPHILS # BLD AUTO: 0.07 K/UL — SIGNIFICANT CHANGE UP (ref 0–0.2)
BASOPHILS NFR BLD AUTO: 0.8 % — SIGNIFICANT CHANGE UP (ref 0–2)
BILIRUB SERPL-MCNC: 0.3 MG/DL — SIGNIFICANT CHANGE UP (ref 0.2–1.2)
BUN SERPL-MCNC: 21 MG/DL — SIGNIFICANT CHANGE UP (ref 7–23)
BUN SERPL-MCNC: 23 MG/DL — SIGNIFICANT CHANGE UP (ref 7–23)
CALCIUM SERPL-MCNC: 9.3 MG/DL — SIGNIFICANT CHANGE UP (ref 8.4–10.5)
CALCIUM SERPL-MCNC: 9.3 MG/DL — SIGNIFICANT CHANGE UP (ref 8.4–10.5)
CHLORIDE SERPL-SCNC: 97 MMOL/L — LOW (ref 98–107)
CHLORIDE SERPL-SCNC: 98 MMOL/L — SIGNIFICANT CHANGE UP (ref 98–107)
CO2 SERPL-SCNC: 23 MMOL/L — SIGNIFICANT CHANGE UP (ref 22–31)
CO2 SERPL-SCNC: 24 MMOL/L — SIGNIFICANT CHANGE UP (ref 22–31)
CREAT SERPL-MCNC: 1.8 MG/DL — HIGH (ref 0.5–1.3)
CREAT SERPL-MCNC: 1.97 MG/DL — HIGH (ref 0.5–1.3)
EOSINOPHIL # BLD AUTO: 0.4 K/UL — SIGNIFICANT CHANGE UP (ref 0–0.5)
EOSINOPHIL NFR BLD AUTO: 4.5 % — SIGNIFICANT CHANGE UP (ref 0–6)
GLUCOSE BLDC GLUCOMTR-MCNC: 101 MG/DL — HIGH (ref 70–99)
GLUCOSE SERPL-MCNC: 108 MG/DL — HIGH (ref 70–99)
GLUCOSE SERPL-MCNC: 108 MG/DL — HIGH (ref 70–99)
HCT VFR BLD CALC: 37.1 % — LOW (ref 39–50)
HGB BLD-MCNC: 11.8 G/DL — LOW (ref 13–17)
IMM GRANULOCYTES # BLD AUTO: 0.05 # — SIGNIFICANT CHANGE UP
IMM GRANULOCYTES NFR BLD AUTO: 0.6 % — SIGNIFICANT CHANGE UP (ref 0–1.5)
LYMPHOCYTES # BLD AUTO: 1.2 K/UL — SIGNIFICANT CHANGE UP (ref 1–3.3)
LYMPHOCYTES # BLD AUTO: 13.5 % — SIGNIFICANT CHANGE UP (ref 13–44)
MAGNESIUM SERPL-MCNC: 2.1 MG/DL — SIGNIFICANT CHANGE UP (ref 1.6–2.6)
MCHC RBC-ENTMCNC: 29 PG — SIGNIFICANT CHANGE UP (ref 27–34)
MCHC RBC-ENTMCNC: 31.8 % — LOW (ref 32–36)
MCV RBC AUTO: 91.2 FL — SIGNIFICANT CHANGE UP (ref 80–100)
METHOD TYPE: SIGNIFICANT CHANGE UP
MONOCYTES # BLD AUTO: 0.67 K/UL — SIGNIFICANT CHANGE UP (ref 0–0.9)
MONOCYTES NFR BLD AUTO: 7.5 % — SIGNIFICANT CHANGE UP (ref 2–14)
NEUTROPHILS # BLD AUTO: 6.51 K/UL — SIGNIFICANT CHANGE UP (ref 1.8–7.4)
NEUTROPHILS NFR BLD AUTO: 73.1 % — SIGNIFICANT CHANGE UP (ref 43–77)
NRBC # FLD: 0 — SIGNIFICANT CHANGE UP
ORGANISM # SPEC MICROSCOPIC CNT: SIGNIFICANT CHANGE UP
PLATELET # BLD AUTO: 254 K/UL — SIGNIFICANT CHANGE UP (ref 150–400)
PMV BLD: 10.1 FL — SIGNIFICANT CHANGE UP (ref 7–13)
POTASSIUM SERPL-MCNC: 5.4 MMOL/L — HIGH (ref 3.5–5.3)
POTASSIUM SERPL-MCNC: 6.3 MMOL/L — CRITICAL HIGH (ref 3.5–5.3)
POTASSIUM SERPL-SCNC: 5.4 MMOL/L — HIGH (ref 3.5–5.3)
POTASSIUM SERPL-SCNC: 6.3 MMOL/L — CRITICAL HIGH (ref 3.5–5.3)
PROT SERPL-MCNC: 7.4 G/DL — SIGNIFICANT CHANGE UP (ref 6–8.3)
RBC # BLD: 4.07 M/UL — LOW (ref 4.2–5.8)
RBC # FLD: 12.1 % — SIGNIFICANT CHANGE UP (ref 10.3–14.5)
SODIUM SERPL-SCNC: 131 MMOL/L — LOW (ref 135–145)
SODIUM SERPL-SCNC: 134 MMOL/L — LOW (ref 135–145)
WBC # BLD: 8.9 K/UL — SIGNIFICANT CHANGE UP (ref 3.8–10.5)
WBC # FLD AUTO: 8.9 K/UL — SIGNIFICANT CHANGE UP (ref 3.8–10.5)

## 2018-05-24 PROCEDURE — 99223 1ST HOSP IP/OBS HIGH 75: CPT

## 2018-05-24 PROCEDURE — 99222 1ST HOSP IP/OBS MODERATE 55: CPT | Mod: GC

## 2018-05-24 RX ORDER — FINASTERIDE 5 MG/1
5 TABLET, FILM COATED ORAL DAILY
Qty: 0 | Refills: 0 | Status: DISCONTINUED | OUTPATIENT
Start: 2018-05-24 | End: 2018-05-31

## 2018-05-24 RX ORDER — HEPARIN SODIUM 5000 [USP'U]/ML
5000 INJECTION INTRAVENOUS; SUBCUTANEOUS EVERY 8 HOURS
Qty: 0 | Refills: 0 | Status: DISCONTINUED | OUTPATIENT
Start: 2018-05-24 | End: 2018-05-31

## 2018-05-24 RX ORDER — MONTELUKAST 4 MG/1
1 TABLET, CHEWABLE ORAL
Qty: 0 | Refills: 0 | COMMUNITY

## 2018-05-24 RX ORDER — FLUTICASONE PROPIONATE AND SALMETEROL 50; 250 UG/1; UG/1
1 POWDER ORAL; RESPIRATORY (INHALATION)
Qty: 0 | Refills: 0 | COMMUNITY

## 2018-05-24 RX ORDER — FLUTICASONE PROPIONATE 50 MCG
1 SPRAY, SUSPENSION NASAL
Qty: 0 | Refills: 0 | Status: DISCONTINUED | OUTPATIENT
Start: 2018-05-24 | End: 2018-05-31

## 2018-05-24 RX ORDER — FINASTERIDE 5 MG/1
1 TABLET, FILM COATED ORAL
Qty: 0 | Refills: 0 | COMMUNITY

## 2018-05-24 RX ORDER — INSULIN HUMAN 100 [IU]/ML
5 INJECTION, SOLUTION SUBCUTANEOUS ONCE
Qty: 0 | Refills: 0 | Status: COMPLETED | OUTPATIENT
Start: 2018-05-24 | End: 2018-05-24

## 2018-05-24 RX ORDER — ERTAPENEM SODIUM 1 G/1
1000 INJECTION, POWDER, LYOPHILIZED, FOR SOLUTION INTRAMUSCULAR; INTRAVENOUS ONCE
Qty: 0 | Refills: 0 | Status: COMPLETED | OUTPATIENT
Start: 2018-05-24 | End: 2018-05-24

## 2018-05-24 RX ORDER — MONTELUKAST 4 MG/1
10 TABLET, CHEWABLE ORAL DAILY
Qty: 0 | Refills: 0 | Status: DISCONTINUED | OUTPATIENT
Start: 2018-05-24 | End: 2018-05-31

## 2018-05-24 RX ORDER — ERTAPENEM SODIUM 1 G/1
500 INJECTION, POWDER, LYOPHILIZED, FOR SOLUTION INTRAMUSCULAR; INTRAVENOUS EVERY 24 HOURS
Qty: 0 | Refills: 0 | Status: DISCONTINUED | OUTPATIENT
Start: 2018-05-25 | End: 2018-05-31

## 2018-05-24 RX ORDER — SODIUM POLYSTYRENE SULFONATE 4.1 MEQ/G
15 POWDER, FOR SUSPENSION ORAL ONCE
Qty: 0 | Refills: 0 | Status: COMPLETED | OUTPATIENT
Start: 2018-05-24 | End: 2018-05-24

## 2018-05-24 RX ORDER — SODIUM BICARBONATE 1 MEQ/ML
50 SYRINGE (ML) INTRAVENOUS ONCE
Qty: 0 | Refills: 0 | Status: COMPLETED | OUTPATIENT
Start: 2018-05-24 | End: 2018-05-24

## 2018-05-24 RX ORDER — ALBUTEROL 90 UG/1
2 AEROSOL, METERED ORAL
Qty: 0 | Refills: 0 | COMMUNITY

## 2018-05-24 RX ORDER — DEXTROSE 50 % IN WATER 50 %
50 SYRINGE (ML) INTRAVENOUS ONCE
Qty: 0 | Refills: 0 | Status: COMPLETED | OUTPATIENT
Start: 2018-05-24 | End: 2018-05-24

## 2018-05-24 RX ORDER — BUDESONIDE AND FORMOTEROL FUMARATE DIHYDRATE 160; 4.5 UG/1; UG/1
2 AEROSOL RESPIRATORY (INHALATION)
Qty: 0 | Refills: 0 | Status: DISCONTINUED | OUTPATIENT
Start: 2018-05-24 | End: 2018-05-31

## 2018-05-24 RX ORDER — FLUTICASONE PROPIONATE 50 MCG
1 SPRAY, SUSPENSION NASAL
Qty: 0 | Refills: 0 | COMMUNITY

## 2018-05-24 RX ADMIN — INSULIN HUMAN 5 UNIT(S): 100 INJECTION, SOLUTION SUBCUTANEOUS at 21:45

## 2018-05-24 RX ADMIN — BUDESONIDE AND FORMOTEROL FUMARATE DIHYDRATE 2 PUFF(S): 160; 4.5 AEROSOL RESPIRATORY (INHALATION) at 23:31

## 2018-05-24 RX ADMIN — HEPARIN SODIUM 5000 UNIT(S): 5000 INJECTION INTRAVENOUS; SUBCUTANEOUS at 21:46

## 2018-05-24 RX ADMIN — ERTAPENEM SODIUM 120 MILLIGRAM(S): 1 INJECTION, POWDER, LYOPHILIZED, FOR SOLUTION INTRAMUSCULAR; INTRAVENOUS at 16:45

## 2018-05-24 RX ADMIN — Medication 50 MILLILITER(S): at 21:44

## 2018-05-24 RX ADMIN — Medication 50 MILLIEQUIVALENT(S): at 21:46

## 2018-05-24 RX ADMIN — SODIUM POLYSTYRENE SULFONATE 15 GRAM(S): 4.1 POWDER, FOR SUSPENSION ORAL at 21:46

## 2018-05-24 NOTE — H&P ADULT - PROBLEM SELECTOR PLAN 4
unclear cause.  Cr Slightly above baseline.  ? enalapril  - given insulin/D50, and kayexelate  - will recheck BMP

## 2018-05-24 NOTE — ED PROVIDER NOTE - MEDICAL DECISION MAKING DETAILS
Sheyla: 78 M, recent first episode of urinary retention 2/2 BPH, urine Cx at that time w/ ESBL. No F/V/back pain. Appears well. No CVAT. Admit for Ertapenem Sheyla: 78 M, recent first episode of urinary retention 2/2 BPH, urine Cx at that time w/ ESBL. No F/V/back pain. Appears well. No CVAT. will check labs for virgie- Admit for Ertapenem

## 2018-05-24 NOTE — H&P ADULT - HISTORY OF PRESENT ILLNESS
77 y/o M with HTN, BPH, CVA in 1998 without residual deficits, and asthma presents to the ED after he was called back for ESBL E coli in urine.  Pt had recent Rezum procedure done by his urologist for BPH.  Had Collado placed 2 weeks ago by urologist, and was given a 1 week course of Bactrim.  He had Collado removed after 1 week, but 3 days ago, developed suprapubic pain, and urinary obstruction, and came to the ED.  He had Collado placed, and was discharged home.  Pt reports no complaints since discharge.  No fevers, chills, or flank pain.  Has had normal clear yellow urine from catheter.      In the ED, pt was given ertapenem, and evaluated by ID.

## 2018-05-24 NOTE — CONSULT NOTE ADULT - SUBJECTIVE AND OBJECTIVE BOX
Infectious Diseases Consult note  Patient is a 78y old  Male who presents with a chief complaint of   ARTIS PARNELL  MRN-[id number]  HPI:  78 male with HTN and BPH with a tavarez catheter called back for esbl  e.coli in his urine culture.   No sick contacts. No recent travel.       REVIEW OF SYSTEMS  All as below unless noted otherwise  General:  Denies fever and chills. 	  Ophthalmologic: Denies any visual complaints. 	  ENMT: No throat pain.  Respiratory: No cough, sputum. No shortness of breath.   Cardiovascular: No chest pain.   Gastrointestinal: No nausea or vomiting. No abdominal pain. No diarrhea.   Genitourinary: No burning urine, no frequency. No flank pain  Musculoskeletal: No joint swelling or pain.   Neurological: No confusion. 	  Skin: No rash    PAST MEDICAL & SURGICAL HISTORY:  High cholesterol  Diabetes  CVA (cerebral infarction)  Hypertension  No significant past surgical history      FAMILY HISTORY:  No pertinent family history in first degree relatives      SOCIAL HISTORY:    non smoker    no alcohol    ANTIMICROBIALS:      OTHER MEDS:      Allergies    No Known Allergies    Intolerances      Vital Signs Last 24 Hrs  T(C): 36.7 (24 May 2018 14:52), Max: 36.7 (24 May 2018 14:52)  T(F): 98.1 (24 May 2018 14:52), Max: 98.1 (24 May 2018 14:52)  HR: 72 (24 May 2018 14:52) (72 - 72)  BP: 158/75 (24 May 2018 14:52) (158/75 - 158/75)  BP(mean): --  RR: 16 (24 May 2018 14:52) (16 - 16)  SpO2: 99% (24 May 2018 14:52) (99% - 99%)  CAPILLARY BLOOD GLUCOSE        Daily     Daily     PHYSICAL EXAM:  patient in no acute respiratory distress.  Constitutional: Comfortable. Awake and alert  Eyes: PERRL EOMI. No discharge.  ENMT: No sinus tenderness.  No pharyngeal erythema.   Neck: Supple. No thyromegaly   Respiratory:  air entry bilaterally, no wheezes, rhonchi, or crackles  Cardiovascular:S1 S2 wnl, No murmurs  Gastrointestinal: Soft, no tenderness , bowel sounds present,   Genitourinary: No suprapubic tenderness. no CVA tenderness   Musculoskeletal: No joint swelling. No edema.  Vascular: peripheral pulses felt  Neurological: No grossly focal deficits  Skin: No rash   Lymph Nodes: No palpable Lymphadenopathy   Psychiatric: Affect normal  Lines:                         11.8   8.90  )-----------( 254      ( 24 May 2018 16:40 )             37.1     WBC Count: 8.90 (05-24 @ 16:40)                        MICROBIOLOGY:    Culture - Urine (collected 21 May 2018 23:16)  Source: URINE CATHETER  Preliminary Report (24 May 2018 12:10):    COLONY COUNT: 10,000-49,000 CFU/mL  Final Report (24 May 2018 14:50):    RESULT CALLED TO / READ BACK:CANDY PALENCIAD/Y    DATE / TIME CALLED: 05/24/18 1448    CALLED BY: MARIO DUGGAN  Organism: E.COLI ESBL POSITIVE  COLONY COUNT: 10,000-49,000 CFU/mL (24 May 2018 14:50)  Organism: E.COLI ESBL POSITIVE (24 May 2018 12:10)          v  RADIOLOGY: Infectious Diseases Consult note  Patient is a 78y old  Male who presents with a chief complaint of   ARTIS PARNELL  MRN-[id number]  HPI:  78 male with DM, CVA, HTN and BPH with a tavarez catheter called back for esbl  e.coli in his urine culture. Before 2 weeks ago he went the urologist and had a tavarez catheter placed and got antibiotics for 10 days or so. After 1 week his tavarez catheter was removed. A few days after that he was feeling the pain again and came to the ED. He had another tavarez catheter placed which he still has and got no antibiotics. no abdominal pain, diarrhea or burning urine. no fever or chills. No sick contacts. No recent travel.       REVIEW OF SYSTEMS  All as below unless noted otherwise  General:  Denies fever and chills. 	  Ophthalmologic: Denies any visual complaints. 	  ENMT: No throat pain.  Respiratory: No cough, sputum. No shortness of breath.   Cardiovascular: No chest pain.   Gastrointestinal: No nausea or vomiting. No abdominal pain. No diarrhea.   Genitourinary: No burning urine, no frequency. No flank pain  Musculoskeletal: No joint swelling or pain.   Neurological: No confusion. 	  Skin: No rash    PAST MEDICAL & SURGICAL HISTORY:  High cholesterol  Diabetes  CVA (cerebral infarction)  Hypertension  No significant past surgical history      FAMILY HISTORY:  No pertinent family history in first degree relatives      SOCIAL HISTORY:    non smoker    no alcohol    ANTIMICROBIALS:  ertapenem x1     OTHER MEDS:      Allergies  No Known Allergies    Vital Signs Last 24 Hrs  T(C): 36.7 (24 May 2018 14:52), Max: 36.7 (24 May 2018 14:52)  T(F): 98.1 (24 May 2018 14:52), Max: 98.1 (24 May 2018 14:52)  HR: 72 (24 May 2018 14:52) (72 - 72)  BP: 158/75 (24 May 2018 14:52) (158/75 - 158/75)  RR: 16 (24 May 2018 14:52) (16 - 16)  SpO2: 99% (24 May 2018 14:52) (99% - 99%)      PHYSICAL EXAM:  patient in no acute respiratory distress.  Constitutional: Comfortable. Awake and alert- elderly male   Eyes: PERRL EOMI. No discharge.  ENMT: No sinus tenderness.  No pharyngeal erythema.   Neck: Supple. No thyromegaly   Respiratory:  air entry bilaterally, no wheezes, rhonchi, or crackles  Cardiovascular:S1 S2 wnl, No murmurs  Gastrointestinal: Soft, no tenderness , bowel sounds present,   Genitourinary: tavarez catheter   No suprapubic tenderness. no CVA tenderness   Musculoskeletal: No joint swelling. No edema.  Vascular: peripheral pulses felt  Neurological: No grossly focal deficits  Skin: No rash   Lymph Nodes: No palpable Lymphadenopathy   Psychiatric: Affect normal  Lines:                         11.8   8.90  )-----------( 254      ( 24 May 2018 16:40 )             37.1     WBC Count: 8.90 (05-24 @ 16:40)    05-24    131<L>  |  97<L>  |  23  ----------------------------<  108<H>  6.3<HH>   |  23  |  1.97<H>    Ca    9.3      24 May 2018 16:40    TPro  7.4  /  Alb  4.1  /  TBili  0.3  /  DBili  x   /  AST  23  /  ALT  24  /  AlkPhos  65  05-24      MICROBIOLOGY:    Culture - Urine (collected 21 May 2018 23:16)  Source: URINE CATHETER  Preliminary Report (24 May 2018 12:10):    COLONY COUNT: 10,000-49,000 CFU/mL  Final Report (24 May 2018 14:50):    RESULT CALLED TO / READ BACK:CANDY PALENCIAD/Y    DATE / TIME CALLED: 05/24/18 1448    CALLED BY: MARIO DUGGAN  Organism: E.COLI ESBL POSITIVE  COLONY COUNT: 10,000-49,000 CFU/mL (24 May 2018 14:50)  Organism: E.COLI ESBL POSITIVE (24 May 2018 12:10)          RADIOLOGY:  none new

## 2018-05-24 NOTE — H&P ADULT - ASSESSMENT
77 y/o M with HTN, asthma, BPH, and CVA presents after recent ER visit for urinary obstruction, now with urine culture positive for ESBL E coli.

## 2018-05-24 NOTE — ED ADULT TRIAGE NOTE - CHIEF COMPLAINT QUOTE
Pt was seen here on 5/21 for urinary retention.  Pt received call today for + urine cultures, + for E.coli ESBL in urine.  Pt denies any complaints.

## 2018-05-24 NOTE — H&P ADULT - NSHPREVIEWOFSYSTEMS_GEN_ALL_CORE
REVIEW OF SYSTEMS    General:  Negative  Skin/Breast:  pruritic rash of hands (follows with derm)  Ophthalmologic:  Negative  ENMT:  Negative  Respiratory and Thorax:  Negative  Cardiovascular:  Negative  Gastrointestinal:  Negative  Genitourinary:  urinary obstruction   Musculoskeletal:  Negative  Neurological:  Negative  Psychiatric:  Negative  Hematology/Lymphatics:  Negative	  Endocrine:	  Negative  Allergic/Immunologic:	 Negative

## 2018-05-24 NOTE — ED PROVIDER NOTE - ATTENDING CONTRIBUTION TO CARE
I performed a face-to-face evaluation of the patient and performed a history and physical examination. I agree with the history and physical examination.    78 M, recent first episode of urinary retention 2/2 BPH, called back b/c urine Cx at that time grew ESBL. No F/V/back pain. Appears well. No CVAT. Admit for Ertapenem

## 2018-05-24 NOTE — H&P ADULT - NSHPPHYSICALEXAM_GEN_ALL_CORE
Vital Signs Last 24 Hrs  T(C): 36.7 (24 May 2018 14:52), Max: 36.7 (24 May 2018 14:52)  T(F): 98.1 (24 May 2018 14:52), Max: 98.1 (24 May 2018 14:52)  HR: 72 (24 May 2018 14:52) (72 - 72)  BP: 158/75 (24 May 2018 14:52) (158/75 - 158/75)  BP(mean): --  RR: 16 (24 May 2018 14:52) (16 - 16)  SpO2: 99% (24 May 2018 14:52) (99% - 99%)    PHYSICAL EXAM:  GENERAL: No Acute Distress  EYES: conjunctiva and sclera clear  ENMT: Moist mucous membranes   NECK: Supple  CHEST/LUNG: Clear to auscultation bilaterally  HEART: Regular rate and rhythm; No murmurs, rubs, or gallops  ABDOMEN: Soft, Nontender, Nondistended; Bowel sounds normal  : clear yellow urine from tavarez catheter, no CVA tenderness  EXTREMITIES:   No clubbing, cyanosis, or pedal edema  PSYCH: Normal Affect, Normal Behavior  NEUROLOGY:   - Mental status A&O x 3  SKIN: excoriations on arms  MUSCULOSKELETAL: No joint swelling

## 2018-05-24 NOTE — ED PROVIDER NOTE - OBJECTIVE STATEMENT
78M hx of HTN, BPH with urinary retention 5/24  with growth of ESBL e. coli in urine, called back for admission and abx. No noted fevers/ back pain/ discomfort at home, has an indwelling tavarez from d/c last time, 78M hx of HTN, BPH with urinary retention 5/24  with growth of ESBL e. coli in urine, called back for admission and abx. No noted fevers/ back pain/ discomfort at home, has an indwelling tavarez from d/c last time. Denies chest pain, nausea, emesis, abdominal pain, flank pain, and no new dysuria or hematuria.

## 2018-05-24 NOTE — ED POST DISCHARGE NOTE - RESULT SUMMARY
CANDY Farley: UCX ESBL pos ecoli 10,000-49,000, sensitive to Macrobid. PT initially seen for urinary retention, dc'd w/ corby. Spoke to ID, state Macrobid has poor coverage and recommend pt return for IV abx. Called pt and son, instructed to return to the ER today for admission. Both understand and agree with plan. Admin PA to follow and ensure return to ER. CANDY Farley: UCX ESBL pos ecoli 10,000-49,000, sensitive to Macrobid. PT initially seen for urinary retention, dc'd w/ tavarez. Spoke to ID, state Macrobid has poor coverage and recommend pt return for IV abx. Called pt and son, instructed to return to the ER today for admission. Both understand and agree with plan.

## 2018-05-24 NOTE — H&P ADULT - NSHPLABSRESULTS_GEN_ALL_CORE
05-24    131<L>  |  97<L>  |  23  ----------------------------<  108<H>  6.3<HH>   |  23  |  1.97<H>    Ca    9.3      24 May 2018 16:40    TPro  7.4  /  Alb  4.1  /  TBili  0.3  /  DBili  x   /  AST  23  /  ALT  24  /  AlkPhos  65  05-24                            11.8   8.90  )-----------( 254      ( 24 May 2018 16:40 )             37.1       EKG tracing reviewed: sinus with 1st degree AV block

## 2018-05-24 NOTE — ED PROVIDER NOTE - CARE PLAN
Principal Discharge DX:	Cystitis  Secondary Diagnosis:	Urinary retention due to benign prostatic hyperplasia

## 2018-05-24 NOTE — ED ADULT NURSE NOTE - OBJECTIVE STATEMENT
Patient received AA&Ox3 after being called to come back to hospital for (+) urine culture (ESBL). VSS on RA. Patient denies chest pain, N/V, SOB, fever, chills, dyspnea, dizziness, abdominal pain at this time. Patient ambulates independently, skin intact. 20g PIV in place to left AC, labs drawn, medication administered per orders, NAD noted - will continue to monitor.

## 2018-05-25 DIAGNOSIS — N40.1 BENIGN PROSTATIC HYPERPLASIA WITH LOWER URINARY TRACT SYMPTOMS: ICD-10-CM

## 2018-05-25 DIAGNOSIS — N17.9 ACUTE KIDNEY FAILURE, UNSPECIFIED: ICD-10-CM

## 2018-05-25 DIAGNOSIS — I12.9 HYPERTENSIVE CHRONIC KIDNEY DISEASE WITH STAGE 1 THROUGH STAGE 4 CHRONIC KIDNEY DISEASE, OR UNSPECIFIED CHRONIC KIDNEY DISEASE: ICD-10-CM

## 2018-05-25 DIAGNOSIS — N18.3 CHRONIC KIDNEY DISEASE, STAGE 3 (MODERATE): ICD-10-CM

## 2018-05-25 LAB
BUN SERPL-MCNC: 19 MG/DL — SIGNIFICANT CHANGE UP (ref 7–23)
CALCIUM SERPL-MCNC: 9.6 MG/DL — SIGNIFICANT CHANGE UP (ref 8.4–10.5)
CHLORIDE SERPL-SCNC: 101 MMOL/L — SIGNIFICANT CHANGE UP (ref 98–107)
CO2 SERPL-SCNC: 22 MMOL/L — SIGNIFICANT CHANGE UP (ref 22–31)
CREAT SERPL-MCNC: 1.65 MG/DL — HIGH (ref 0.5–1.3)
GLUCOSE BLDC GLUCOMTR-MCNC: 118 MG/DL — HIGH (ref 70–99)
GLUCOSE SERPL-MCNC: 102 MG/DL — HIGH (ref 70–99)
HCT VFR BLD CALC: 40.4 % — SIGNIFICANT CHANGE UP (ref 39–50)
HGB BLD-MCNC: 12.9 G/DL — LOW (ref 13–17)
MAGNESIUM SERPL-MCNC: 2.1 MG/DL — SIGNIFICANT CHANGE UP (ref 1.6–2.6)
MCHC RBC-ENTMCNC: 28.3 PG — SIGNIFICANT CHANGE UP (ref 27–34)
MCHC RBC-ENTMCNC: 31.9 % — LOW (ref 32–36)
MCV RBC AUTO: 88.6 FL — SIGNIFICANT CHANGE UP (ref 80–100)
NRBC # FLD: 0 — SIGNIFICANT CHANGE UP
PLATELET # BLD AUTO: 279 K/UL — SIGNIFICANT CHANGE UP (ref 150–400)
PMV BLD: 10.1 FL — SIGNIFICANT CHANGE UP (ref 7–13)
POTASSIUM SERPL-MCNC: 5.3 MMOL/L — SIGNIFICANT CHANGE UP (ref 3.5–5.3)
POTASSIUM SERPL-SCNC: 5.3 MMOL/L — SIGNIFICANT CHANGE UP (ref 3.5–5.3)
RBC # BLD: 4.56 M/UL — SIGNIFICANT CHANGE UP (ref 4.2–5.8)
RBC # FLD: 12.1 % — SIGNIFICANT CHANGE UP (ref 10.3–14.5)
SODIUM SERPL-SCNC: 137 MMOL/L — SIGNIFICANT CHANGE UP (ref 135–145)
SPECIMEN SOURCE: SIGNIFICANT CHANGE UP
SPECIMEN SOURCE: SIGNIFICANT CHANGE UP
WBC # BLD: 6.88 K/UL — SIGNIFICANT CHANGE UP (ref 3.8–10.5)
WBC # FLD AUTO: 6.88 K/UL — SIGNIFICANT CHANGE UP (ref 3.8–10.5)

## 2018-05-25 PROCEDURE — 76775 US EXAM ABDO BACK WALL LIM: CPT | Mod: 26

## 2018-05-25 PROCEDURE — 99232 SBSQ HOSP IP/OBS MODERATE 35: CPT | Mod: GC

## 2018-05-25 RX ADMIN — ERTAPENEM SODIUM 100 MILLIGRAM(S): 1 INJECTION, POWDER, LYOPHILIZED, FOR SOLUTION INTRAMUSCULAR; INTRAVENOUS at 17:55

## 2018-05-25 RX ADMIN — HEPARIN SODIUM 5000 UNIT(S): 5000 INJECTION INTRAVENOUS; SUBCUTANEOUS at 22:47

## 2018-05-25 RX ADMIN — FINASTERIDE 5 MILLIGRAM(S): 5 TABLET, FILM COATED ORAL at 11:54

## 2018-05-25 RX ADMIN — HEPARIN SODIUM 5000 UNIT(S): 5000 INJECTION INTRAVENOUS; SUBCUTANEOUS at 13:40

## 2018-05-25 RX ADMIN — Medication 1 SPRAY(S): at 06:17

## 2018-05-25 RX ADMIN — Medication 1 SPRAY(S): at 17:59

## 2018-05-25 RX ADMIN — HEPARIN SODIUM 5000 UNIT(S): 5000 INJECTION INTRAVENOUS; SUBCUTANEOUS at 06:17

## 2018-05-25 RX ADMIN — MONTELUKAST 10 MILLIGRAM(S): 4 TABLET, CHEWABLE ORAL at 11:54

## 2018-05-25 RX ADMIN — BUDESONIDE AND FORMOTEROL FUMARATE DIHYDRATE 2 PUFF(S): 160; 4.5 AEROSOL RESPIRATORY (INHALATION) at 08:35

## 2018-05-25 NOTE — CONSULT NOTE ADULT - ATTENDING COMMENTS
[facility] NEPHROLOGY  Dustin Talavera M.D.  Renzo Larsen D.O.  More Live M.D.  Niyah Larry, MSN, ANP-C    Telephone: [phone]  Facsimile: [phone]    [address] [place] [address]
ESBL UTI in patient with urinary catheter.  Afebrile and WBC normal.   Agree with ertapenem 1 gm iv daily.  Anticipate 5-7 days tx.

## 2018-05-25 NOTE — CONSULT NOTE ADULT - PROBLEM SELECTOR RECOMMENDATION 5
Due to renal insufficiency in setting of ACE-I use (now discontinued) and high potassium diet as patient admits to eating bananas and oranges and tomatoes. Patient educated on low potassium diet as outpatient. Change diet to low potassium. Monitor serum potassium.

## 2018-05-25 NOTE — CONSULT NOTE ADULT - PROBLEM SELECTOR RECOMMENDATION 2
Patient with h/o CKD-3 (baseline Scr 1.5-1.6). Patient will need outpatient CKD work up once UTI treated. Can check renal US to assess kidney size and cortex. Monitor electrolytes.

## 2018-05-25 NOTE — CONSULT NOTE ADULT - SUBJECTIVE AND OBJECTIVE BOX
[facility] NEPHROLOGY- CONSULTATION NOTE    78y Male with history of below presents with ESBL in urine culture. Nephrology consulted for elevated Scr.    Patient is unaware of prior history of kidney disease and has never seen a nephrologist in the past. However, as per our records, patient appears to have a history of CKD-3 with baseline Scr 1.5-1.6. Patient on enalapril 10 mg twice daily as outpatient. Patient denies any h/o nephrolithiasis, hepatitis B, C, HIV, IVDA, tattoos, prior PRBC transfusions, herbal medication use or chronic NSAID use. Patient denies any family h/o kidney disease.    REVIEW OF SYSTEMS:  Gen: no changes in weight  HEENT: no rhinorrhea  Neck: no sore throat  Cards: no chest pain  Resp: no dyspnea  GI: no nausea or vomiting or diarrhea  : no dysuria or hematuria  Vascular: no LE edema  Derm: no rashes  Neuro: no numbness/tingling    No Known Allergies      Home Medications Reviewed  Hospital Medications:   MEDICATIONS  (STANDING):  buDESOnide 160 MICROgram(s)/formoterol 4.5 MICROgram(s) Inhaler 2 Puff(s) Inhalation two times a day  ertapenem  IVPB 500 milliGRAM(s) IV Intermittent every 24 hours  finasteride 5 milliGRAM(s) Oral daily  fluticasone propionate 50 MICROgram(s)/spray Nasal Spray 1 Spray(s) Both Nostrils two times a day  heparin  Injectable 5000 Unit(s) SubCutaneous every 8 hours  montelukast 10 milliGRAM(s) Oral daily      PAST MEDICAL & SURGICAL HISTORY:  High cholesterol  Diabetes  CVA (cerebral infarction)  Hypertension  No significant past surgical history      FAMILY HISTORY:  No pertinent family history in first degree relatives      SOCIAL HISTORY:  Denies toxic substance use     VITALS:  T(F): 98 (18 @ 14:19), Max: 98.3 (18 @ 21:19)  HR: 72 (18 @ 14:19)  BP: 140/76 (18 @ 14:19)  RR: 17 (18 @ 14:19)  SpO2: 99% (18 @ 14:19)  Wt(kg): --        PHYSICAL EXAM:  Gen: NAD, calm  HEENT: MMM  Neck: no JVD  Cards: RRR, +S1/S2, no M/G/R  Resp: CTA B/L  GI: soft, NT/ND, NABS  : no CVA tenderness, + tavarez with bag empty  Vascular: no LE edema B/L  Derm: no rashes  Neuro: non-focal    LABS:      137  |  101  |  19  ----------------------------<  102<H>  5.3   |  22  |  1.65<H>    Ca    9.6      25 May 2018 06:10  Mg     2.1         TPro  7.4  /  Alb  4.1  /  TBili  0.3  /  DBili      /  AST  23  /  ALT  24  /  AlkPhos  65      Creatinine Trend: 1.65 <--, 1.80 <--, 1.97 <--                        12.9   6.88  )-----------( 279      ( 25 May 2018 06:10 )             40.4     Urine Studies:  Urinalysis Basic - ( 21 May 2018 22:52 )    Color: YELLOW / Appearance: CLEAR / S.015 / pH: 6.5  Gluc: NEGATIVE / Ketone: NEGATIVE  / Bili: NEGATIVE / Urobili: 0.2 mg/dL   Blood: MODERATE / Protein: 30 mg/dL / Nitrite: NEGATIVE   Leuk Esterase: MODERATE / RBC: 25-50 / WBC >50   Sq Epi:  / Non Sq Epi:  / Bacteria: FEW

## 2018-05-25 NOTE — CONSULT NOTE ADULT - PROBLEM SELECTOR RECOMMENDATION 3
BP borderline. Change diet to low sodium. Holding enalapril given POLO and hyperkalemia. If BP increases, would consider CCB as needed. Monitor BP.

## 2018-05-25 NOTE — CONSULT NOTE ADULT - ASSESSMENT
78 male with DM, CVA, HTN and BPH with a tavarez catheter called back for esbl e.coli in his urine culture. he was recently treated with antibiotic.  Patient has been afebrile with no leukocytosis with no hypotension and no tachycardia.   UA positive      ESBL E.coli in urine culture in a patient with urinary retention with a tavarez catheter   f/u blood cultures  continue ertapenem for now   he was not getting sick at home- may not be infected- may be colonized  do not know which antibiotic he got earlier this month or which organism was grow in the clinic- he could have grown an ESBL organism then as well
78y Male with history of HTN presents with ESBL in urine culture. Nephrology consulted for elevated Scr.

## 2018-05-25 NOTE — PROGRESS NOTE ADULT - SUBJECTIVE AND OBJECTIVE BOX
IM ATTENDING COVERING   Patient seen and examined at bedside  No acute events noted overnight  Case discussed with medical team    HPI:  77 y/o M with HTN, BPH, CVA in 1998 without residual deficits, and asthma presents to the ED after he was called back for ESBL E coli in urine.  Pt had recent Rezum procedure done by his urologist for BPH.  Had Tavarez placed 2 weeks ago by urologist, and was given a 1 week course of Bactrim.  He had Tavarez removed after 1 week, but 3 days ago, developed suprapubic pain, and urinary obstruction, and came to the ED.  He had Tavarez placed, and was discharged home.  Pt reports no complaints since discharge.  No fevers, chills, or flank pain.  Has had normal clear yellow urine from catheter.      In the ED, pt was given ertapenem, and evaluated by ID. (24 May 2018 18:33)      PAST MEDICAL & SURGICAL HISTORY:  High cholesterol  Diabetes  CVA (cerebral infarction)  Hypertension  No significant past surgical history      No Known Allergies       MEDICATIONS  (STANDING):  buDESOnide 160 MICROgram(s)/formoterol 4.5 MICROgram(s) Inhaler 2 Puff(s) Inhalation two times a day  ertapenem  IVPB 500 milliGRAM(s) IV Intermittent every 24 hours  finasteride 5 milliGRAM(s) Oral daily  fluticasone propionate 50 MICROgram(s)/spray Nasal Spray 1 Spray(s) Both Nostrils two times a day  heparin  Injectable 5000 Unit(s) SubCutaneous every 8 hours  montelukast 10 milliGRAM(s) Oral daily    MEDICATIONS  (PRN):      REVIEW OF SYSTEMS:  CONSTITUTIONAL: (+) malaise.   EYES: No acute change in vision   ENT:  No tinnitus  NECK: No stiffness  RESPIRATORY: No hemoptysis  CARDIOVASCULAR: No chest pain, palpitations, syncope  GASTROINTESTINAL: No hematemesis, diarrhea, melena, or hematochezia.  GENITOURINARY: No hematuria  NEUROLOGICAL: No headaches  LYMPH Nodes: No enlarged glands  ENDOCRINE: No heat or cold intolerance	    T(C): 36.3 (05-25-18 @ 10:47), Max: 36.8 (05-24-18 @ 21:19)  HR: 77 (05-25-18 @ 10:47) (63 - 80)  BP: 143/80 (05-25-18 @ 10:47) (132/70 - 159/77)  RR: 18 (05-25-18 @ 10:47) (16 - 18)  SpO2: 99% (05-25-18 @ 10:47) (94% - 99%)    PHYSICAL EXAMINATION:   Constitutional: WD, NAD  HEENT: NC, AT  Neck:  Supple  Respiratory:  Adequate airflow b/l. Not using accessory muscles of respiration.  Cardiovascular:  S1 & S2 intact, no R/G, 2+ radial pulses b/l  Gastrointestinal: Soft, NT, ND, normoactive b.s., no organomegaly/RT/rigidity  Extremities: WWP  : tavarez intact  Neurological:  Alert and awake.  No acute focal motor deficits. Crude sensation intact.     Labs and imaging reviewed    LABS:                        12.9   6.88  )-----------( 279      ( 25 May 2018 06:10 )             40.4     05-25    137  |  101  |  19  ----------------------------<  102<H>  5.3   |  22  |  1.65<H>    Ca    9.6      25 May 2018 06:10  Mg     2.1     05-25    TPro  7.4  /  Alb  4.1  /  TBili  0.3  /  DBili  x   /  AST  23  /  ALT  24  /  AlkPhos  65  05-24            CAPILLARY BLOOD GLUCOSE      POCT Blood Glucose.: 118 mg/dL (25 May 2018 09:33)  POCT Blood Glucose.: 101 mg/dL (24 May 2018 21:43)        LIVER FUNCTIONS - ( 24 May 2018 16:40 )  Alb: 4.1 g/dL / Pro: 7.4 g/dL / ALK PHOS: 65 u/L / ALT: 24 u/L / AST: 23 u/L / GGT: x               RADIOLOGY & ADDITIONAL STUDIES:

## 2018-05-25 NOTE — PROGRESS NOTE ADULT - SUBJECTIVE AND OBJECTIVE BOX
Infectious Diseases Follow up note   NAE PARNELL-[id number]    F/u: ESBL E.coli in urine culture in a patient with urinary retention with a tavarez catheter    Interval History:      Review of systems:  General: no fever or chills.  HEENT: no sore throat.   Respiratory: no shortness of breath. no cough.  Cardiovascular: no chest pain. no palpitations.   Gastrointestinal :no nausea or vomiting. no abdominal pain. no diarrhea.   Genitourinary: no burning urine.   Musculoskeletal: no joint pain.  Lymphatic system: no swollen lymph nodes.   Skin: no rash.   Neurological: no headache.     Allergies  No Known Allergies    ANTIMICROBIALS:    ertapenem  IVPB 500 every 24 hours    buDESOnide 160 MICROgram(s)/formoterol 4.5 MICROgram(s) Inhaler 2 Puff(s) Inhalation two times a day  ertapenem  IVPB 500 milliGRAM(s) IV Intermittent every 24 hours  finasteride 5 milliGRAM(s) Oral daily  fluticasone propionate 50 MICROgram(s)/spray Nasal Spray 1 Spray(s) Both Nostrils two times a day  heparin  Injectable 5000 Unit(s) SubCutaneous every 8 hours  montelukast 10 milliGRAM(s) Oral daily    Vital Signs Last 24 Hrs  T(C): 36.7 (25 May 2018 06:16), Max: 36.8 (24 May 2018 21:19)  T(F): 98.1 (25 May 2018 06:16), Max: 98.3 (24 May 2018 21:19)  HR: 68 (25 May 2018 06:16) (63 - 80)  BP: 145/82 (25 May 2018 06:16) (132/70 - 159/77)  RR: 18 (25 May 2018 06:16) (16 - 18)  SpO2: 97% (25 May 2018 06:16) (94% - 99%)    PHYSICAL EXAM:  General:  non-toxic  HEAD/EYES:  PERRL  white sclera  ENT:  normal  supple  Cardiovascular:   no murmur   Respiratory:   clear to ausculation bilaterally  GI:   soft, non-tender, normal bowel sounds  :   no CVA tenderness   Musculoskeletal:  no synovitis  Neurologic:   non-focal exam   Skin:   no rash  Lymph:  no lymphadenopathy  Psychiatric:   appropriate affect, alert & oriented  Lines:  no phlebitis                        12.9   6.88  )-----------( 279      ( 25 May 2018 06:10 )             40.4     WBC Count: 6.88 (05-25 @ 06:10)  WBC Count: 8.90 (05-24 @ 16:40)    05-25    137  |  101  |  19  ----------------------------<  102<H>  5.3   |  22  |  1.65<H>    Ca    9.6      25 May 2018 06:10  Mg     2.1     05-25    TPro  7.4  /  Alb  4.1  /  TBili  0.3  /  DBili  x   /  AST  23  /  ALT  24  /  AlkPhos  65  05-24    Microbiology:   none new    RADIOLOGY:  none new Infectious Diseases Follow up note   NAE PARNELL-[id number]    F/u: ESBL E.coli in urine culture in a patient with urinary retention with a tavarez catheter    Interval History:  stilling ok  tavarez catheter was changes he said the old one was slipping out    Review of systems:  General: no fever or chills.  HEENT: no sore throat.   Respiratory: no shortness of breath. no cough.  Cardiovascular: no chest pain. no palpitations.   Gastrointestinal :no nausea or vomiting. no abdominal pain. no diarrhea.   Genitourinary: no burning urine.   Musculoskeletal: no joint pain.  Lymphatic system: no swollen lymph nodes.   Skin: no rash.   Neurological: no headache.     Allergies  No Known Allergies    ANTIMICROBIALS:    ertapenem  IVPB 500 every 24 hours    buDESOnide 160 MICROgram(s)/formoterol 4.5 MICROgram(s) Inhaler 2 Puff(s) Inhalation two times a day  ertapenem  IVPB 500 milliGRAM(s) IV Intermittent every 24 hours  finasteride 5 milliGRAM(s) Oral daily  fluticasone propionate 50 MICROgram(s)/spray Nasal Spray 1 Spray(s) Both Nostrils two times a day  heparin  Injectable 5000 Unit(s) SubCutaneous every 8 hours  montelukast 10 milliGRAM(s) Oral daily    Vital Signs Last 24 Hrs  T(C): 36.7 (25 May 2018 06:16), Max: 36.8 (24 May 2018 21:19)  T(F): 98.1 (25 May 2018 06:16), Max: 98.3 (24 May 2018 21:19)  HR: 68 (25 May 2018 06:16) (63 - 80)  BP: 145/82 (25 May 2018 06:16) (132/70 - 159/77)  RR: 18 (25 May 2018 06:16) (16 - 18)  SpO2: 97% (25 May 2018 06:16) (94% - 99%)    PHYSICAL EXAM:  General:  non-toxic  HEAD/EYES:  PERRL  white sclera  ENT:  normal  supple  Cardiovascular:   no murmur   Respiratory:   clear to ausculation bilaterally  GI:   soft, non-tender, normal bowel sounds  :   tavarez catheter   no CVA tenderness   Musculoskeletal:  no synovitis  Neurologic:   non-focal exam   Skin:   no rash  Lymph:  no lymphadenopathy  Psychiatric:   appropriate affect, alert & oriented  Lines:  no phlebitis                        12.9   6.88  )-----------( 279      ( 25 May 2018 06:10 )             40.4     WBC Count: 6.88 (05-25 @ 06:10)  WBC Count: 8.90 (05-24 @ 16:40)    05-25    137  |  101  |  19  ----------------------------<  102<H>  5.3   |  22  |  1.65<H>    Ca    9.6      25 May 2018 06:10  Mg     2.1     05-25    TPro  7.4  /  Alb  4.1  /  TBili  0.3  /  DBili  x   /  AST  23  /  ALT  24  /  AlkPhos  65  05-24    Microbiology:   none new    RADIOLOGY:  none new Infectious Diseases Follow up note   NAE PARNELL-[id number]    F/u: ESBL E.coli in urine culture in a patient with urinary retention with a tavarez catheter    Interval History:  stilling ok.  some chills last night.  tavarez catheter was changes he said the old one was slipping out    Review of systems:  General: no fever or chills.  HEENT: no sore throat.   Respiratory: no shortness of breath. no cough.  Cardiovascular: no chest pain. no palpitations.   Gastrointestinal :no nausea or vomiting. no abdominal pain. no diarrhea.   Genitourinary: no burning urine.   Musculoskeletal: no joint pain.  Lymphatic system: no swollen lymph nodes.   Skin: no rash.   Neurological: no headache.     Allergies  No Known Allergies    ANTIMICROBIALS:    ertapenem  IVPB 500 every 24 hours    buDESOnide 160 MICROgram(s)/formoterol 4.5 MICROgram(s) Inhaler 2 Puff(s) Inhalation two times a day  ertapenem  IVPB 500 milliGRAM(s) IV Intermittent every 24 hours  finasteride 5 milliGRAM(s) Oral daily  fluticasone propionate 50 MICROgram(s)/spray Nasal Spray 1 Spray(s) Both Nostrils two times a day  heparin  Injectable 5000 Unit(s) SubCutaneous every 8 hours  montelukast 10 milliGRAM(s) Oral daily    Vital Signs Last 24 Hrs  T(C): 36.7 (25 May 2018 06:16), Max: 36.8 (24 May 2018 21:19)  T(F): 98.1 (25 May 2018 06:16), Max: 98.3 (24 May 2018 21:19)  HR: 68 (25 May 2018 06:16) (63 - 80)  BP: 145/82 (25 May 2018 06:16) (132/70 - 159/77)  RR: 18 (25 May 2018 06:16) (16 - 18)  SpO2: 97% (25 May 2018 06:16) (94% - 99%)    PHYSICAL EXAM:  General:  non-toxic  HEAD/EYES:  PERRL  white sclera  ENT:  normal  supple  Cardiovascular:   no murmur   Respiratory:   clear to ausculation bilaterally  GI:   soft, non-tender, normal bowel sounds  :   tavarez catheter   no CVA tenderness   Musculoskeletal:  no synovitis  Neurologic:   non-focal exam   Skin:   no rash  Lymph:  no lymphadenopathy  Psychiatric:   appropriate affect, alert & oriented  Lines:  no phlebitis                        12.9   6.88  )-----------( 279      ( 25 May 2018 06:10 )             40.4     WBC Count: 6.88 (05-25 @ 06:10)  WBC Count: 8.90 (05-24 @ 16:40)    05-25    137  |  101  |  19  ----------------------------<  102<H>  5.3   |  22  |  1.65<H>    Ca    9.6      25 May 2018 06:10  Mg     2.1     05-25    TPro  7.4  /  Alb  4.1  /  TBili  0.3  /  DBili  x   /  AST  23  /  ALT  24  /  AlkPhos  65  05-24    Microbiology:   none new    RADIOLOGY:  none new

## 2018-05-26 RX ADMIN — BUDESONIDE AND FORMOTEROL FUMARATE DIHYDRATE 2 PUFF(S): 160; 4.5 AEROSOL RESPIRATORY (INHALATION) at 21:35

## 2018-05-26 RX ADMIN — HEPARIN SODIUM 5000 UNIT(S): 5000 INJECTION INTRAVENOUS; SUBCUTANEOUS at 12:30

## 2018-05-26 RX ADMIN — BUDESONIDE AND FORMOTEROL FUMARATE DIHYDRATE 2 PUFF(S): 160; 4.5 AEROSOL RESPIRATORY (INHALATION) at 12:24

## 2018-05-26 RX ADMIN — ERTAPENEM SODIUM 100 MILLIGRAM(S): 1 INJECTION, POWDER, LYOPHILIZED, FOR SOLUTION INTRAMUSCULAR; INTRAVENOUS at 18:01

## 2018-05-26 RX ADMIN — Medication 1 SPRAY(S): at 06:54

## 2018-05-26 RX ADMIN — MONTELUKAST 10 MILLIGRAM(S): 4 TABLET, CHEWABLE ORAL at 12:29

## 2018-05-26 RX ADMIN — Medication 1 SPRAY(S): at 18:01

## 2018-05-26 RX ADMIN — HEPARIN SODIUM 5000 UNIT(S): 5000 INJECTION INTRAVENOUS; SUBCUTANEOUS at 21:35

## 2018-05-26 RX ADMIN — HEPARIN SODIUM 5000 UNIT(S): 5000 INJECTION INTRAVENOUS; SUBCUTANEOUS at 06:55

## 2018-05-26 RX ADMIN — BUDESONIDE AND FORMOTEROL FUMARATE DIHYDRATE 2 PUFF(S): 160; 4.5 AEROSOL RESPIRATORY (INHALATION) at 00:04

## 2018-05-26 RX ADMIN — FINASTERIDE 5 MILLIGRAM(S): 5 TABLET, FILM COATED ORAL at 12:30

## 2018-05-26 NOTE — PROGRESS NOTE ADULT - SUBJECTIVE AND OBJECTIVE BOX
IM ATTENDING COVERING   Patient seen and examined at bedside  No acute events noted overnight  Case discussed with medical team    HPI:  77 y/o M with HTN, BPH, CVA in 1998 without residual deficits, and asthma presents to the ED after he was called back for ESBL E coli in urine.  Pt had recent Rezum procedure done by his urologist for BPH.  Had Tavarez placed 2 weeks ago by urologist, and was given a 1 week course of Bactrim.  He had Tavarez removed after 1 week, but 3 days ago, developed suprapubic pain, and urinary obstruction, and came to the ED.  He had Tavarez placed, and was discharged home.  Pt reports no complaints since discharge.  No fevers, chills, or flank pain.  Has had normal clear yellow urine from catheter.    In the ED, pt was given ertapenem, and evaluated by ID. (24 May 2018 18:33)      PAST MEDICAL & SURGICAL HISTORY:  High cholesterol  Diabetes  CVA (cerebral infarction)  Hypertension  No significant past surgical history      No Known Allergies       MEDICATIONS  (STANDING):  buDESOnide 160 MICROgram(s)/formoterol 4.5 MICROgram(s) Inhaler 2 Puff(s) Inhalation two times a day  ertapenem  IVPB 500 milliGRAM(s) IV Intermittent every 24 hours  finasteride 5 milliGRAM(s) Oral daily  fluticasone propionate 50 MICROgram(s)/spray Nasal Spray 1 Spray(s) Both Nostrils two times a day  heparin  Injectable 5000 Unit(s) SubCutaneous every 8 hours  montelukast 10 milliGRAM(s) Oral daily    MEDICATIONS  (PRN):      REVIEW OF SYSTEMS:  CONSTITUTIONAL: (+) improving malaise.   EYES: No acute change in vision   ENT:  No tinnitus  NECK: No stiffness  RESPIRATORY: No hemoptysis  CARDIOVASCULAR: No chest pain, palpitations, syncope  GASTROINTESTINAL: No hematemesis, diarrhea, melena, or hematochezia.  GENITOURINARY: No hematuria  NEUROLOGICAL: No headaches  LYMPH Nodes: No enlarged glands  ENDOCRINE: No heat or cold intolerance	    Vital Signs Last 24 Hrs  T(C): 36.4 (26 May 2018 06:53), Max: 36.7 (25 May 2018 14:19)  T(F): 97.6 (26 May 2018 06:53), Max: 98 (25 May 2018 14:19)  HR: 83 (26 May 2018 06:53) (72 - 85)  BP: 138/90 (26 May 2018 06:53) (138/90 - 152/89)  BP(mean): --  RR: 18 (26 May 2018 06:53) (17 - 18)  SpO2: 97% (26 May 2018 06:53) (97% - 99%)    PHYSICAL EXAMINATION:   Constitutional: WD, NAD  HEENT: NC, AT  Neck:  Supple  Respiratory:  Adequate airflow b/l. Not using accessory muscles of respiration.  Cardiovascular:  S1 & S2 intact, no R/G, 2+ radial pulses b/l  Gastrointestinal: Soft, NT, ND, normoactive b.s., no organomegaly/RT/rigidity  Extremities: WWP  : tavarez intact  Neurological:  Alert and awake.  No acute focal motor deficits. Crude sensation intact.     Labs and imaging reviewed

## 2018-05-26 NOTE — PROGRESS NOTE ADULT - SUBJECTIVE AND OBJECTIVE BOX
[facility] NEPHROLOGY- CONSULTATION NOTE    78y Male with history of below presents with ESBL in urine culture. Nephrology consulted for elevated Scr.    Patient is unaware of prior history of kidney disease and has never seen a nephrologist in the past. However, as per our records, patient appears to have a history of CKD-3 with baseline Scr 1.5-1.6. Patient on enalapril 10 mg twice daily as outpatient. Patient denies any h/o nephrolithiasis, hepatitis B, C, HIV, IVDA, tattoos, prior PRBC transfusions, herbal medication use or chronic NSAID use. Patient denies any family h/o kidney disease.    Pt feels better today.  No dysuria    REVIEW OF SYSTEMS:  Gen: no fever  Cards: no chest pain  Resp: no dyspnea  GI: no nausea or vomiting or diarrhea  : no dysuria or hematuria  Vascular: no LE edema    No Known Allergies      VITALS:  T(F): 97.6 (18 @ 06:53), Max: 98 (18 @ 14:19)  HR: 83 (18 @ 06:53)  BP: 138/90 (18 @ 06:53)  RR: 18 (18 @ 06:53)  SpO2: 97% (18 @ 06:53)  Wt(kg): --     @ 07:  -   @ 07:00  --------------------------------------------------------  IN: 0 mL / OUT: 600 mL / NET: -600 mL     @ 07:01  -   @ 08:51  --------------------------------------------------------  IN: 0 mL / OUT: 550 mL / NET: -550 mL      PHYSICAL EXAM:  Gen: NAD, calm  HEENT: MMM  Neck: no JVD  Cards: RRR, +S1/S2, no M/G/R  Resp: CTA B/L  GI: soft, NT/ND, NABS  : no CVA tenderness, + tavarez with bag empty  Vascular: no LE edema B/L  Derm: no rashes  Neuro: non-focal    LABS:      137  |  101  |  19  ----------------------------<  102<H>  5.3   |  22  |  1.65<H>    Ca    9.6      25 May 2018 06:10  Mg     2.1         TPro  7.4  /  Alb  4.1  /  TBili  0.3  /  DBili      /  AST  23  /  ALT  24  /  AlkPhos  65      Creatinine Trend: 1.65 <--, 1.80 <--, 1.97 <--                        12.9   6.88  )-----------( 279      ( 25 May 2018 06:10 )             40.4     Urine Studies:  Urinalysis Basic - ( 21 May 2018 22:52 )    Color: YELLOW / Appearance: CLEAR / S.015 / pH: 6.5  Gluc: NEGATIVE / Ketone: NEGATIVE  / Bili: NEGATIVE / Urobili: 0.2 mg/dL   Blood: MODERATE / Protein: 30 mg/dL / Nitrite: NEGATIVE   Leuk Esterase: MODERATE / RBC: 25-50 / WBC >50   Sq Epi:  / Non Sq Epi:  / Bacteria: FEW      RADIOLOGY:    < from: US Renal (18 @ 16:34) >    EXAM:  US KIDNEY(S)        PROCEDURE DATE:  May 25 2018         INTERPRETATION:  CLINICAL INFORMATION: Chronic renal failure.    COMPARISON: None available.    TECHNIQUE: Sonography of the kidneys and bladder.     FINDINGS:    Right kidney:  10.0 cm. Normal cortical thickness. No renal mass,   hydronephrosis or calculi. 1.2 cm simple cyst.    Left kidney:  11.1 cm. Normal cortical thickness. No renal mass,   hydronephrosis or calculi. 1.1 cm simple cyst.    Urinary bladder: Within normal limits.    IMPRESSION:     Normal renal ultrasound.                  DANYA DIAZ M.D., ATTENDING RADIOLOGIST  This document has been electronically signed. May 25 2018  5:00PM                  < end of copied text >

## 2018-05-27 ENCOUNTER — TRANSCRIPTION ENCOUNTER (OUTPATIENT)
Age: 78
End: 2018-05-27

## 2018-05-27 LAB
BUN SERPL-MCNC: 26 MG/DL — HIGH (ref 7–23)
CALCIUM SERPL-MCNC: 9.1 MG/DL — SIGNIFICANT CHANGE UP (ref 8.4–10.5)
CHLORIDE SERPL-SCNC: 101 MMOL/L — SIGNIFICANT CHANGE UP (ref 98–107)
CO2 SERPL-SCNC: 20 MMOL/L — LOW (ref 22–31)
CREAT SERPL-MCNC: 1.54 MG/DL — HIGH (ref 0.5–1.3)
GLUCOSE SERPL-MCNC: 96 MG/DL — SIGNIFICANT CHANGE UP (ref 70–99)
HCT VFR BLD CALC: 38.7 % — LOW (ref 39–50)
HGB BLD-MCNC: 12.4 G/DL — LOW (ref 13–17)
MAGNESIUM SERPL-MCNC: 2 MG/DL — SIGNIFICANT CHANGE UP (ref 1.6–2.6)
MCHC RBC-ENTMCNC: 28 PG — SIGNIFICANT CHANGE UP (ref 27–34)
MCHC RBC-ENTMCNC: 32 % — SIGNIFICANT CHANGE UP (ref 32–36)
MCV RBC AUTO: 87.4 FL — SIGNIFICANT CHANGE UP (ref 80–100)
NRBC # FLD: 0 — SIGNIFICANT CHANGE UP
PLATELET # BLD AUTO: 271 K/UL — SIGNIFICANT CHANGE UP (ref 150–400)
PMV BLD: 10.3 FL — SIGNIFICANT CHANGE UP (ref 7–13)
POTASSIUM SERPL-MCNC: 4.8 MMOL/L — SIGNIFICANT CHANGE UP (ref 3.5–5.3)
POTASSIUM SERPL-SCNC: 4.8 MMOL/L — SIGNIFICANT CHANGE UP (ref 3.5–5.3)
RBC # BLD: 4.43 M/UL — SIGNIFICANT CHANGE UP (ref 4.2–5.8)
RBC # FLD: 12.2 % — SIGNIFICANT CHANGE UP (ref 10.3–14.5)
SODIUM SERPL-SCNC: 136 MMOL/L — SIGNIFICANT CHANGE UP (ref 135–145)
WBC # BLD: 7.3 K/UL — SIGNIFICANT CHANGE UP (ref 3.8–10.5)
WBC # FLD AUTO: 7.3 K/UL — SIGNIFICANT CHANGE UP (ref 3.8–10.5)

## 2018-05-27 RX ADMIN — Medication 1 SPRAY(S): at 06:11

## 2018-05-27 RX ADMIN — HEPARIN SODIUM 5000 UNIT(S): 5000 INJECTION INTRAVENOUS; SUBCUTANEOUS at 21:31

## 2018-05-27 RX ADMIN — BUDESONIDE AND FORMOTEROL FUMARATE DIHYDRATE 2 PUFF(S): 160; 4.5 AEROSOL RESPIRATORY (INHALATION) at 21:31

## 2018-05-27 RX ADMIN — MONTELUKAST 10 MILLIGRAM(S): 4 TABLET, CHEWABLE ORAL at 12:32

## 2018-05-27 RX ADMIN — ERTAPENEM SODIUM 100 MILLIGRAM(S): 1 INJECTION, POWDER, LYOPHILIZED, FOR SOLUTION INTRAMUSCULAR; INTRAVENOUS at 17:24

## 2018-05-27 RX ADMIN — FINASTERIDE 5 MILLIGRAM(S): 5 TABLET, FILM COATED ORAL at 12:32

## 2018-05-27 RX ADMIN — HEPARIN SODIUM 5000 UNIT(S): 5000 INJECTION INTRAVENOUS; SUBCUTANEOUS at 06:11

## 2018-05-27 RX ADMIN — Medication 1 SPRAY(S): at 17:24

## 2018-05-27 RX ADMIN — HEPARIN SODIUM 5000 UNIT(S): 5000 INJECTION INTRAVENOUS; SUBCUTANEOUS at 12:32

## 2018-05-27 RX ADMIN — BUDESONIDE AND FORMOTEROL FUMARATE DIHYDRATE 2 PUFF(S): 160; 4.5 AEROSOL RESPIRATORY (INHALATION) at 12:30

## 2018-05-27 NOTE — DISCHARGE NOTE ADULT - PATIENT PORTAL LINK FT
You can access the [facility] Portal, offered by [facility], by registering with the following website: [email or web address]

## 2018-05-27 NOTE — PROGRESS NOTE ADULT - SUBJECTIVE AND OBJECTIVE BOX
[facility] NEPHROLOGY- CONSULTATION NOTE    78y Male with history of below presents with ESBL in urine culture. Nephrology consulted for elevated Scr.    Patient is unaware of prior history of kidney disease and has never seen a nephrologist in the past. However, as per our records, patient appears to have a history of CKD-3 with baseline Scr 1.5-1.6. Patient on enalapril 10 mg twice daily as outpatient. Patient denies any h/o nephrolithiasis, hepatitis B, C, HIV, IVDA, tattoos, prior PRBC transfusions, herbal medication use or chronic NSAID use. Patient denies any family h/o kidney disease.    Pt feels well.    REVIEW OF SYSTEMS:  Gen: no fever  Cards: no chest pain  Resp: no dyspnea  GI: no nausea or vomiting or diarrhea  : no dysuria or hematuria  Vascular: no LE edema    No Known Allergies      VITALS:  T(F): 97.9 (18 @ 06:09), Max: 98.6 (18 @ 20:45)  HR: 77 (18 @ 06:09)  BP: 139/84 (18 @ 06:09)  RR: 18 (18 @ 06:09)  SpO2: 97% (18 @ 06:09)  Wt(kg): --     @ 07:  -   @ 07:00  --------------------------------------------------------  IN: 0 mL / OUT: 1900 mL / NET: -1900 mL     @ 07:  -   @ 13:38  --------------------------------------------------------  IN: 0 mL / OUT: 500 mL / NET: -500 mL        PHYSICAL EXAM:  Gen: NAD, calm  HEENT: MMM  Neck: no JVD  Cards: RRR, +S1/S2, no M/G/R  Resp: CTA B/L  GI: soft, NT/ND, NABS  : no CVA tenderness, + tavarez w yellow urine  Vascular: no LE edema B/L  Derm: no rashes  Neuro: non-focal    LABS:      136  |  101  |  26<H>  ----------------------------<  96  4.8   |  20<L>  |  1.54<H>    Ca    9.1      27 May 2018 06:30  Mg     2.0           Creatinine Trend: 1.54 <--, 1.65 <--, 1.80 <--, 1.97 <--                        12.4   7.30  )-----------( 271      ( 27 May 2018 06:30 )             38.7     Urine Studies:  Urinalysis Basic - ( 21 May 2018 22:52 )    Color: YELLOW / Appearance: CLEAR / S.015 / pH: 6.5  Gluc: NEGATIVE / Ketone: NEGATIVE  / Bili: NEGATIVE / Urobili: 0.2 mg/dL   Blood: MODERATE / Protein: 30 mg/dL / Nitrite: NEGATIVE   Leuk Esterase: MODERATE / RBC: 25-50 / WBC >50   Sq Epi:  / Non Sq Epi:  / Bacteria: FEW

## 2018-05-27 NOTE — PROGRESS NOTE ADULT - SUBJECTIVE AND OBJECTIVE BOX
IM ATTENDING COVERING   Patient seen and examined at bedside earlier today  No acute events noted overnight  Case discussed with medical team    HPI:  79 y/o M with HTN, BPH, CVA in 1998 without residual deficits, and asthma presents to the ED after he was called back for ESBL E coli in urine.  Pt had recent Rezum procedure done by his urologist for BPH.  Had Collado placed 2 weeks ago by urologist, and was given a 1 week course of Bactrim.  He had Collado removed after 1 week, but 3 days ago, developed suprapubic pain, and urinary obstruction, and came to the ED.  He had Collado placed, and was discharged home.  Pt reports no complaints since discharge.  No fevers, chills, or flank pain.  Has had normal clear yellow urine from catheter.      In the ED, pt was given ertapenem, and evaluated by ID. (24 May 2018 18:33)      PAST MEDICAL & SURGICAL HISTORY:  High cholesterol  Diabetes  CVA (cerebral infarction)  Hypertension  No significant past surgical history      No Known Allergies       MEDICATIONS  (STANDING):  buDESOnide 160 MICROgram(s)/formoterol 4.5 MICROgram(s) Inhaler 2 Puff(s) Inhalation two times a day  ertapenem  IVPB 500 milliGRAM(s) IV Intermittent every 24 hours  finasteride 5 milliGRAM(s) Oral daily  fluticasone propionate 50 MICROgram(s)/spray Nasal Spray 1 Spray(s) Both Nostrils two times a day  heparin  Injectable 5000 Unit(s) SubCutaneous every 8 hours  montelukast 10 milliGRAM(s) Oral daily    MEDICATIONS  (PRN):      REVIEW OF SYSTEMS:  CONSTITUTIONAL: (+) malaise.   EYES: No acute change in vision   ENT:  No tinnitus  NECK: No stiffness  RESPIRATORY: No hemoptysis  CARDIOVASCULAR: No chest pain, palpitations, syncope  GASTROINTESTINAL: No hematemesis, diarrhea, melena, or hematochezia.  GENITOURINARY: No hematuria  NEUROLOGICAL: No headaches  LYMPH Nodes: No enlarged glands  ENDOCRINE: No heat or cold intolerance	    T(C): 36.6 (05-27-18 @ 06:09), Max: 37 (05-26-18 @ 20:45)  HR: 77 (05-27-18 @ 06:09) (77 - 82)  BP: 139/84 (05-27-18 @ 06:09) (118/67 - 140/76)  RR: 18 (05-27-18 @ 06:09) (17 - 18)  SpO2: 97% (05-27-18 @ 06:09) (97% - 98%)    PHYSICAL EXAMINATION:   Constitutional: WD, NAD  HEENT: NC, AT  Neck:  Supple  Respiratory:  Adequate airflow b/l. Not using accessory muscles of respiration.  Cardiovascular:  S1 & S2 intact, no R/G, 2+ radial pulses b/l  Gastrointestinal: Soft, NT, ND, normoactive b.s., no organomegaly/RT/rigidity  Extremities: WWP  Neurological:  Alert and awake.  No acute focal motor deficits. Crude sensation intact.     Labs and imaging reviewed    LABS:                        12.4   7.30  )-----------( 271      ( 27 May 2018 06:30 )             38.7     05-27    136  |  101  |  26<H>  ----------------------------<  96  4.8   |  20<L>  |  1.54<H>    Ca    9.1      27 May 2018 06:30  Mg     2.0     05-27              CAPILLARY BLOOD GLUCOSE      POCT Blood Glucose.: 108 mg/dL (27 May 2018 08:34)              RADIOLOGY & ADDITIONAL STUDIES:

## 2018-05-27 NOTE — DISCHARGE NOTE ADULT - MEDICATION SUMMARY - MEDICATIONS TO TAKE
I will START or STAY ON the medications listed below when I get home from the hospital:    finasteride 5 mg oral tablet  -- 1 tab(s) by mouth once a day  -- Indication: For Benign prostatic hyperplasia with lower urinary tract symptoms    Advair Diskus 500 mcg-50 mcg inhalation powder  -- 1 puff(s) inhaled 2 times a day  -- Indication: For Asthma, unspecified asthma severity, unspecified whether complicated, unspecified whether persistent    albuterol 90 mcg/inh inhalation aerosol  -- 2 puff(s) inhaled 4 times a day, As Needed  -- Indication: For Asthma, unspecified asthma severity, unspecified whether complicated, unspecified whether persistent    montelukast 10 mg oral tablet  -- 1 tab(s) by mouth once a day  -- Indication: For Asthma, unspecified asthma severity, unspecified whether complicated, unspecified whether persistent    Flonase 50 mcg/inh nasal spray  -- 1 spray(s) into nose once a day  -- Indication: For Allergies

## 2018-05-27 NOTE — DISCHARGE NOTE ADULT - CARE PLAN
Principal Discharge DX:	ESBL (extended spectrum beta-lactamase) producing bacteria infection  Goal:	Resolution and return to baseline  Assessment and plan of treatment:	Continue antibiotics as directed and monitor for signs/symptoms of infection, such as, fever/chills, burning/pain with urination, urinary frequency/hesitancy, cloudy urine, or blood in urine.  Secondary Diagnosis:	Acute kidney injury  Assessment and plan of treatment:	Improving. Continue your medications as directed and please follow-up as an outpatient with your primary care provider for further care and recommendations.  Secondary Diagnosis:	CKD (chronic kidney disease), stage III  Assessment and plan of treatment:	It is recommended that you go for a full work-up as outpatient with the nephrology team, so make an appointment to see within 1-2 weeks upon discharge.  Secondary Diagnosis:	Essential hypertension  Assessment and plan of treatment:	Your Enalapril was stopped due to a kidney injury. Continue to hold this medication until you follow-up with your PCP within 1 week of discharge. Continue blood pressure medication regimen as directed. Monitor for any visual changes, headaches or dizziness.  Monitor blood pressure regularly.  Follow up with your PCP for further management for high blood pressure.  Secondary Diagnosis:	Urinary retention due to benign prostatic hyperplasia  Assessment and plan of treatment:	Continue your medications as directed and please follow-up as an outpatient with your primary care provider for further care and recommendations.  Secondary Diagnosis:	Diabetes  Assessment and plan of treatment:	Continue consistent carbohydrate diet.  Monitor blood glucose levels throughout the day before meals and at bedtime.  Record blood sugars and bring to outpatient providers appointment in order to be reviewed by your doctor for management modifications.  Be aware of diabetes management symptoms including feeling cool and clammy may be related to low glucose levels.  Feeling hot and dry may indicate high glucose levels. If you feel these symptoms, check your blood sugar.  Make regular podiatry appointments in order to have feet checked for wounds and toe nails cut by a doctor to prevent infections, as well as, appointments with an ophthalmologist to monitor your vision. Principal Discharge DX:	ESBL (extended spectrum beta-lactamase) producing bacteria infection  Goal:	Resolution and return to baseline  Assessment and plan of treatment:	You were treated with antibiotics during your hospital stay. Monitor for signs/symptoms of infection, such as, fever/chills, burning/pain with urination, urinary frequency/hesitancy, cloudy urine, or blood in urine.  Secondary Diagnosis:	Acute kidney injury  Assessment and plan of treatment:	Improving. Continue your medications as directed and please follow-up as an outpatient with your primary care provider for further care and recommendations.  Secondary Diagnosis:	CKD (chronic kidney disease), stage III  Assessment and plan of treatment:	It is recommended that you go for a full work-up as outpatient with the nephrology team, so make an appointment to see within 1-2 weeks upon discharge.  Secondary Diagnosis:	Essential hypertension  Assessment and plan of treatment:	Your Enalapril was stopped due to a kidney injury. Continue to hold this medication until you follow-up with your PCP within 1 week of discharge. Continue blood pressure medication regimen as directed. Monitor for any visual changes, headaches or dizziness.  Monitor blood pressure regularly.  Follow up with your PCP for further management for high blood pressure.  Secondary Diagnosis:	Urinary retention due to benign prostatic hyperplasia  Assessment and plan of treatment:	Continue your medications as directed and please follow-up as an outpatient with your primary care provider for further care and recommendations.  Secondary Diagnosis:	Diabetes  Assessment and plan of treatment:	Continue consistent carbohydrate diet.  Monitor blood glucose levels throughout the day before meals and at bedtime.  Record blood sugars and bring to outpatient providers appointment in order to be reviewed by your doctor for management modifications.  Be aware of diabetes management symptoms including feeling cool and clammy may be related to low glucose levels.  Feeling hot and dry may indicate high glucose levels. If you feel these symptoms, check your blood sugar.  Make regular podiatry appointments in order to have feet checked for wounds and toe nails cut by a doctor to prevent infections, as well as, appointments with an ophthalmologist to monitor your vision. Principal Discharge DX:	ESBL (extended spectrum beta-lactamase) producing bacteria infection  Goal:	Resolution and return to baseline  Assessment and plan of treatment:	You were treated with antibiotics during your hospital stay. Monitor for signs/symptoms of infection, such as, fever/chills, burning/pain with urination, urinary frequency/hesitancy, cloudy urine, or blood in urine. Follow up with PMD within 1 week for re-eval and further management.  Secondary Diagnosis:	Acute kidney injury  Assessment and plan of treatment:	Improving. Continue your medications as directed and please follow-up with Dr. Hager from Nephrology in 1-2 weeks for re-eval and further management.  Secondary Diagnosis:	CKD (chronic kidney disease), stage III  Assessment and plan of treatment:	It is recommended that you go for a full work-up as outpatient with the nephrology team, so make an appointment with Dr. Hager from [facility] Nephrology to see within 1-2 weeks.  Secondary Diagnosis:	Essential hypertension  Assessment and plan of treatment:	Your Enalapril was stopped due to a kidney injury. Continue to hold this medication until you follow-up with your PCP within 1 week of discharge. Continue blood pressure medication regimen as directed. Monitor for any visual changes, headaches or dizziness.  Monitor blood pressure regularly.  Follow up with your PCP for further management for high blood pressure.  Secondary Diagnosis:	Urinary retention due to benign prostatic hyperplasia  Assessment and plan of treatment:	Continue your medications as directed and please follow-up as an outpatient with Dr. Kowalski from Urology for further management of your tavarez.  Secondary Diagnosis:	Diabetes  Assessment and plan of treatment:	Continue consistent carbohydrate diet.  Monitor blood glucose levels throughout the day before meals and at bedtime.  Record blood sugars and bring to outpatient providers appointment in order to be reviewed by your doctor for management modifications.  Be aware of diabetes management symptoms including feeling cool and clammy may be related to low glucose levels.  Feeling hot and dry may indicate high glucose levels. If you feel these symptoms, check your blood sugar.  Make regular podiatry appointments in order to have feet checked for wounds and toe nails cut by a doctor to prevent infections, as well as, appointments with an ophthalmologist to monitor your vision.

## 2018-05-27 NOTE — DISCHARGE NOTE ADULT - CARE PROVIDER_API CALL
Blaze Bailey (MBBS), Medicine  [address] [place] [address]  Phone: [phone]  Fax: [phone] Blaze Bailey (MBBS), Medicine  Pending sale to [facility] [place] [address]  Phone: [phone]  Fax: [phone]    [facility] NEPHROLOGY,   Dustin Talavera M.D.  MARCO Pal M.D.  Niyah Larry, MSN, [facility]  [address] [place] [id number].  Phone: [phone]  Fax: (   )    - Blaze Bailey (MBBS), Medicine  [address] [place] [address]  Phone: [phone]  Fax: [phone]    [facility] NEPHROLOGY,   Dustin Talavera M.D.  Renzo Larsen D.O.  More Live M.D.  Niyah Larry, MSN, [facility]-  [address] [place] [id number].  Phone: [phone]  Fax: (   )    -    Nnamdi Kowalski), Urology  [address] [place] [address]  Phone: [phone]  Fax: [phone]

## 2018-05-27 NOTE — DISCHARGE NOTE ADULT - MEDICATION SUMMARY - MEDICATIONS TO STOP TAKING
I will STOP taking the medications listed below when I get home from the hospital:    enalapril 10 mg oral tablet  -- 2 tab(s) by mouth once a day

## 2018-05-27 NOTE — DISCHARGE NOTE ADULT - PROVIDER TOKENS
MICHELINE:'[id number]' TOKEN:'[id number]',FREE:[LAST:[[facility] NEPHROLOGY],PHONE:[phone]],FAX:[(   )    -],ADDRESS:[DARRON Paulino D.O. Jeny Varghese, M.D.  JANNIE Langford, ANP-C  [address] [place] [address].]] TOKEN:'[id number]',FREE:[LAST:[[facility] NEPHROLOGY],PHONE:[phone]],FAX:[(   )    -],ADDRESS:[DARRON Paulino D.O. Jeny Varghese, M.D. Lisa Lewis, JANNIE, ANP-C  [address] [place] [address].]],TOKEN:'[id number]'

## 2018-05-27 NOTE — DISCHARGE NOTE ADULT - CARE PROVIDERS DIRECT ADDRESSES
,[email or web address],DirectAddress_Unknown ,[email or web address],DirectAddress_Unknown,DirectAddress_Unknown

## 2018-05-27 NOTE — DISCHARGE NOTE ADULT - PLAN OF CARE
Resolution and return to baseline Continue antibiotics as directed and monitor for signs/symptoms of infection, such as, fever/chills, burning/pain with urination, urinary frequency/hesitancy, cloudy urine, or blood in urine. Improving. Continue your medications as directed and please follow-up as an outpatient with your primary care provider for further care and recommendations. It is recommended that you go for a full work-up as outpatient with the nephrology team, so make an appointment to see within 1-2 weeks upon discharge. Your Enalapril was stopped due to a kidney injury. Continue to hold this medication until you follow-up with your PCP within 1 week of discharge. Continue blood pressure medication regimen as directed. Monitor for any visual changes, headaches or dizziness.  Monitor blood pressure regularly.  Follow up with your PCP for further management for high blood pressure. Continue your medications as directed and please follow-up as an outpatient with your primary care provider for further care and recommendations. Continue consistent carbohydrate diet.  Monitor blood glucose levels throughout the day before meals and at bedtime.  Record blood sugars and bring to outpatient providers appointment in order to be reviewed by your doctor for management modifications.  Be aware of diabetes management symptoms including feeling cool and clammy may be related to low glucose levels.  Feeling hot and dry may indicate high glucose levels. If you feel these symptoms, check your blood sugar.  Make regular podiatry appointments in order to have feet checked for wounds and toe nails cut by a doctor to prevent infections, as well as, appointments with an ophthalmologist to monitor your vision. You were treated with antibiotics during your hospital stay. Monitor for signs/symptoms of infection, such as, fever/chills, burning/pain with urination, urinary frequency/hesitancy, cloudy urine, or blood in urine. You were treated with antibiotics during your hospital stay. Monitor for signs/symptoms of infection, such as, fever/chills, burning/pain with urination, urinary frequency/hesitancy, cloudy urine, or blood in urine. Follow up with PMD within 1 week for re-eval and further management. Improving. Continue your medications as directed and please follow-up with Dr. Hager from Nephrology in 1-2 weeks for re-eval and further management. It is recommended that you go for a full work-up as outpatient with the nephrology team, so make an appointment with Dr. Hager from [facility] Nephrology to see within 1-2 weeks. Continue your medications as directed and please follow-up as an outpatient with Dr. Kowalski from Urology for further management of your tavarez.

## 2018-05-27 NOTE — CHART NOTE - NSCHARTNOTEFT_GEN_A_CORE
patient voluntarily expresses his wishes to have me or my colleague as his pcp, I agree, information exchanged

## 2018-05-27 NOTE — DISCHARGE NOTE ADULT - ADDITIONAL INSTRUCTIONS
Follow-up with PCP and nephrology as outpatient  [facility] NEPHROLOGY  Dustin Talavera M.D.  Renzo Larsen D.O.  More Live M.D.  Niyah Larry, MSN, ANP-C  Telephone: [phone]  [address] [place] [address]. Follow-up with PCP, urology and nephrology as outpatient  [facility] NEPHROLOGY  Dustin Talavera M.D.  Telephone: [phone]  [address] [place] [address].    Dr. Kowalski (urology)

## 2018-05-27 NOTE — DISCHARGE NOTE ADULT - HOSPITAL COURSE
77 y/o M with HTN, asthma, BPH p/w ESBL on urine culture from 3 days ago.  Had tavarez placd 5/21 for obstruction.      ESBL   - ID on board  - Ertapenem 1 gm iv daily. Anticipate 5-7 days tx.      POLO/Hyperkalemia  - Renal on board  - US: negative  - Trend electrolytes     Essential hypertension.    - Hold enalapril for mild POLO and hyperkalemia.     Asthma  - Continue advair.     Prophylactic measure   - [facility]    Dispo - Home 79 y/o M with HTN, asthma, BPH p/w ESBL on urine culture from 3 days ago.  Had tavarez placd 5/21 for obstruction.      ESBL   - ID on board  - Ertapenem 1 gm iv daily. Anticipate 5-7 days tx.      POLO/Hyperkalemia  - Renal on board  - US: negative  - Trend electrolytes     Essential hypertension.    - Hold enalapril for mild POLO and hyperkalemia.     Urinary Retention  -Tavarez in place  -f/u outpatient urologist Dr. Kowalski for further management    Asthma  - Continue advair.     Prophylactic measure   - [facility]    Dispo - Home

## 2018-05-27 NOTE — DISCHARGE NOTE ADULT - SECONDARY DIAGNOSIS.
Acute kidney injury CKD (chronic kidney disease), stage III Essential hypertension Urinary retention due to benign prostatic hyperplasia Diabetes

## 2018-05-28 LAB
BUN SERPL-MCNC: 25 MG/DL — HIGH (ref 7–23)
CALCIUM SERPL-MCNC: 9.2 MG/DL — SIGNIFICANT CHANGE UP (ref 8.4–10.5)
CHLORIDE SERPL-SCNC: 101 MMOL/L — SIGNIFICANT CHANGE UP (ref 98–107)
CO2 SERPL-SCNC: 21 MMOL/L — LOW (ref 22–31)
CREAT SERPL-MCNC: 1.39 MG/DL — HIGH (ref 0.5–1.3)
GLUCOSE SERPL-MCNC: 102 MG/DL — HIGH (ref 70–99)
HCT VFR BLD CALC: 39 % — SIGNIFICANT CHANGE UP (ref 39–50)
HGB BLD-MCNC: 12.6 G/DL — LOW (ref 13–17)
MAGNESIUM SERPL-MCNC: 2 MG/DL — SIGNIFICANT CHANGE UP (ref 1.6–2.6)
MCHC RBC-ENTMCNC: 28.9 PG — SIGNIFICANT CHANGE UP (ref 27–34)
MCHC RBC-ENTMCNC: 32.3 % — SIGNIFICANT CHANGE UP (ref 32–36)
MCV RBC AUTO: 89.4 FL — SIGNIFICANT CHANGE UP (ref 80–100)
NRBC # FLD: 0 — SIGNIFICANT CHANGE UP
PLATELET # BLD AUTO: 273 K/UL — SIGNIFICANT CHANGE UP (ref 150–400)
PMV BLD: 10.1 FL — SIGNIFICANT CHANGE UP (ref 7–13)
POTASSIUM SERPL-MCNC: 4.9 MMOL/L — SIGNIFICANT CHANGE UP (ref 3.5–5.3)
POTASSIUM SERPL-SCNC: 4.9 MMOL/L — SIGNIFICANT CHANGE UP (ref 3.5–5.3)
RBC # BLD: 4.36 M/UL — SIGNIFICANT CHANGE UP (ref 4.2–5.8)
RBC # FLD: 12.1 % — SIGNIFICANT CHANGE UP (ref 10.3–14.5)
SODIUM SERPL-SCNC: 136 MMOL/L — SIGNIFICANT CHANGE UP (ref 135–145)
WBC # BLD: 7.57 K/UL — SIGNIFICANT CHANGE UP (ref 3.8–10.5)
WBC # FLD AUTO: 7.57 K/UL — SIGNIFICANT CHANGE UP (ref 3.8–10.5)

## 2018-05-28 RX ADMIN — HEPARIN SODIUM 5000 UNIT(S): 5000 INJECTION INTRAVENOUS; SUBCUTANEOUS at 14:24

## 2018-05-28 RX ADMIN — Medication 1 SPRAY(S): at 18:02

## 2018-05-28 RX ADMIN — BUDESONIDE AND FORMOTEROL FUMARATE DIHYDRATE 2 PUFF(S): 160; 4.5 AEROSOL RESPIRATORY (INHALATION) at 21:14

## 2018-05-28 RX ADMIN — FINASTERIDE 5 MILLIGRAM(S): 5 TABLET, FILM COATED ORAL at 12:45

## 2018-05-28 RX ADMIN — Medication 1 SPRAY(S): at 06:29

## 2018-05-28 RX ADMIN — HEPARIN SODIUM 5000 UNIT(S): 5000 INJECTION INTRAVENOUS; SUBCUTANEOUS at 21:14

## 2018-05-28 RX ADMIN — ERTAPENEM SODIUM 100 MILLIGRAM(S): 1 INJECTION, POWDER, LYOPHILIZED, FOR SOLUTION INTRAMUSCULAR; INTRAVENOUS at 18:02

## 2018-05-28 RX ADMIN — MONTELUKAST 10 MILLIGRAM(S): 4 TABLET, CHEWABLE ORAL at 12:45

## 2018-05-28 RX ADMIN — BUDESONIDE AND FORMOTEROL FUMARATE DIHYDRATE 2 PUFF(S): 160; 4.5 AEROSOL RESPIRATORY (INHALATION) at 08:56

## 2018-05-28 RX ADMIN — HEPARIN SODIUM 5000 UNIT(S): 5000 INJECTION INTRAVENOUS; SUBCUTANEOUS at 06:29

## 2018-05-28 NOTE — PROGRESS NOTE ADULT - SUBJECTIVE AND OBJECTIVE BOX
Patient seen and examined at bedside  No acute events noted overnight  Case discussed with medical team    HPI:  79 y/o M with HTN, BPH, CVA in 1998 without residual deficits, and asthma presents to the ED after he was called back for ESBL E coli in urine.  Pt had recent Rezum procedure done by his urologist for BPH.  Had Collado placed 2 weeks ago by urologist, and was given a 1 week course of Bactrim.  He had Collado removed after 1 week, but 3 days ago, developed suprapubic pain, and urinary obstruction, and came to the ED.  He had Collado placed, and was discharged home.  Pt reports no complaints since discharge.  No fevers, chills, or flank pain.  Has had normal clear yellow urine from catheter.      In the ED, pt was given ertapenem, and evaluated by ID. (24 May 2018 18:33)      PAST MEDICAL & SURGICAL HISTORY:  High cholesterol  Diabetes  CVA (cerebral infarction)  Hypertension  No significant past surgical history      No Known Allergies       MEDICATIONS  (STANDING):  buDESOnide 160 MICROgram(s)/formoterol 4.5 MICROgram(s) Inhaler 2 Puff(s) Inhalation two times a day  ertapenem  IVPB 500 milliGRAM(s) IV Intermittent every 24 hours  finasteride 5 milliGRAM(s) Oral daily  fluticasone propionate 50 MICROgram(s)/spray Nasal Spray 1 Spray(s) Both Nostrils two times a day  heparin  Injectable 5000 Unit(s) SubCutaneous every 8 hours  montelukast 10 milliGRAM(s) Oral daily    MEDICATIONS  (PRN):      REVIEW OF SYSTEMS:  CONSTITUTIONAL: (+) malaise.   EYES: No acute change in vision   ENT:  No tinnitus  NECK: No stiffness  RESPIRATORY: No hemoptysis  CARDIOVASCULAR: No chest pain, palpitations, syncope  GASTROINTESTINAL: No hematemesis, diarrhea, melena, or hematochezia.  GENITOURINARY: No hematuria  NEUROLOGICAL: No headaches  LYMPH Nodes: No enlarged glands  ENDOCRINE: No heat or cold intolerance	    T(C): 36.7 (05-28-18 @ 06:27), Max: 36.8 (05-27-18 @ 20:25)  HR: 69 (05-28-18 @ 06:27) (69 - 79)  BP: 136/84 (05-28-18 @ 06:27) (133/72 - 148/58)  RR: 18 (05-28-18 @ 06:27) (17 - 18)  SpO2: 100% (05-28-18 @ 06:27) (97% - 100%)    PHYSICAL EXAMINATION:   Constitutional: WD, NAD  HEENT: NC, AT  Neck:  Supple  Respiratory:  Adequate airflow b/l. Not using accessory muscles of respiration.  Cardiovascular:  S1 & S2 intact, no R/G, 2+ radial pulses b/l  Gastrointestinal: Soft, NT, ND, normoactive b.s., no organomegaly/RT/rigidity  Extremities: WWP  Neurological:  Alert and awake.  No acute focal motor deficits. Crude sensation intact.     Labs and imaging reviewed    LABS:                        12.6   7.57  )-----------( 273      ( 28 May 2018 06:50 )             39.0     05-28    136  |  101  |  25<H>  ----------------------------<  102<H>  4.9   |  21<L>  |  1.39<H>    Ca    9.2      28 May 2018 06:50  Mg     2.0     05-28              CAPILLARY BLOOD GLUCOSE                  RADIOLOGY & ADDITIONAL STUDIES: IM ATTENDING COVERING  Patient seen and examined at bedside  No acute events noted overnight  Case discussed with medical team    HPI:  79 y/o M with HTN, BPH, CVA in 1998 without residual deficits, and asthma presents to the ED after he was called back for ESBL E coli in urine.  Pt had recent Rezum procedure done by his urologist for BPH.  Had Collado placed 2 weeks ago by urologist, and was given a 1 week course of Bactrim.  He had Collado removed after 1 week, but 3 days ago, developed suprapubic pain, and urinary obstruction, and came to the ED.  He had Collado placed, and was discharged home.  Pt reports no complaints since discharge.  No fevers, chills, or flank pain.  Has had normal clear yellow urine from catheter.      In the ED, pt was given ertapenem, and evaluated by ID. (24 May 2018 18:33)      PAST MEDICAL & SURGICAL HISTORY:  High cholesterol  Diabetes  CVA (cerebral infarction)  Hypertension  No significant past surgical history      No Known Allergies       MEDICATIONS  (STANDING):  buDESOnide 160 MICROgram(s)/formoterol 4.5 MICROgram(s) Inhaler 2 Puff(s) Inhalation two times a day  ertapenem  IVPB 500 milliGRAM(s) IV Intermittent every 24 hours  finasteride 5 milliGRAM(s) Oral daily  fluticasone propionate 50 MICROgram(s)/spray Nasal Spray 1 Spray(s) Both Nostrils two times a day  heparin  Injectable 5000 Unit(s) SubCutaneous every 8 hours  montelukast 10 milliGRAM(s) Oral daily    MEDICATIONS  (PRN):      REVIEW OF SYSTEMS:  CONSTITUTIONAL: (+) malaise.   EYES: No acute change in vision   ENT:  No tinnitus  NECK: No stiffness  RESPIRATORY: No hemoptysis  CARDIOVASCULAR: No chest pain, palpitations, syncope  GASTROINTESTINAL: No hematemesis, diarrhea, melena, or hematochezia.  GENITOURINARY: No hematuria  NEUROLOGICAL: No headaches  LYMPH Nodes: No enlarged glands  ENDOCRINE: No heat or cold intolerance	    T(C): 36.7 (05-28-18 @ 06:27), Max: 36.8 (05-27-18 @ 20:25)  HR: 69 (05-28-18 @ 06:27) (69 - 79)  BP: 136/84 (05-28-18 @ 06:27) (133/72 - 148/58)  RR: 18 (05-28-18 @ 06:27) (17 - 18)  SpO2: 100% (05-28-18 @ 06:27) (97% - 100%)    PHYSICAL EXAMINATION:   Constitutional: WD, NAD  HEENT: NC, AT  Neck:  Supple  Respiratory:  Adequate airflow b/l. Not using accessory muscles of respiration.  Cardiovascular:  S1 & S2 intact, no R/G, 2+ radial pulses b/l  Gastrointestinal: Soft, NT, ND, normoactive b.s., no organomegaly/RT/rigidity  Extremities: WWP  Neurological:  Alert and awake.  No acute focal motor deficits. Crude sensation intact.     Labs and imaging reviewed    LABS:                        12.6   7.57  )-----------( 273      ( 28 May 2018 06:50 )             39.0     05-28    136  |  101  |  25<H>  ----------------------------<  102<H>  4.9   |  21<L>  |  1.39<H>    Ca    9.2      28 May 2018 06:50  Mg     2.0     05-28              CAPILLARY BLOOD GLUCOSE                  RADIOLOGY & ADDITIONAL STUDIES:

## 2018-05-28 NOTE — PROGRESS NOTE ADULT - SUBJECTIVE AND OBJECTIVE BOX
[facility] NEPHROLOGY- CONSULTATION NOTE    78y Male with history of below presents with ESBL in urine culture. Nephrology consulted for elevated Scr.    Patient is unaware of prior history of kidney disease and has never seen a nephrologist in the past. However, as per our records, patient appears to have a history of CKD-3 with baseline Scr 1.5-1.6. Patient on enalapril 10 mg twice daily as outpatient. Patient denies any h/o nephrolithiasis, hepatitis B, C, HIV, IVDA, tattoos, prior PRBC transfusions, herbal medication use or chronic NSAID use. Patient denies any family h/o kidney disease.    Pt feels well, no complaints    REVIEW OF SYSTEMS:  Gen: no fever  Cards: no chest pain  Resp: no dyspnea  GI: no nausea or vomiting or diarrhea  : no dysuria or hematuria  Vascular: no LE edema    No Known Allergies    VITALS:  T(F): 98 (18 @ 06:27), Max: 98.2 (18 @ 20:25)  HR: 69 (18 @ 06:27)  BP: 136/84 (18 @ 06:27)  RR: 18 (18 @ 06:27)  SpO2: 100% (18 @ 06:27)  Wt(kg): --     @ 07:01  -   @ 07:00  --------------------------------------------------------  IN: 0 mL / OUT: 1800 mL / NET: -1800 mL      PHYSICAL EXAM:  Gen: NAD, calm  HEENT: MMM  Neck: no JVD  Cards: RRR, +S1/S2, no M/G/R  Resp: CTA B/L  GI: soft, NT/ND, NABS  : no CVA tenderness, + tavarez w yellow urine  Vascular: no LE edema B/L  Derm: no rashes  Neuro: non-focal        LABS:      136  |  101  |  25<H>  ----------------------------<  102<H>  4.9   |  21<L>  |  1.39<H>    Ca    9.2      28 May 2018 06:50  Mg     2.0     -      Creatinine Trend: 1.39 <--, 1.54 <--, 1.65 <--, 1.80 <--, 1.97 <--                        12.6   7.57  )-----------( 273      ( 28 May 2018 06:50 )             39.0     Urine Studies:  Urinalysis Basic - ( 21 May 2018 22:52 )    Color: YELLOW / Appearance: CLEAR / S.015 / pH: 6.5  Gluc: NEGATIVE / Ketone: NEGATIVE  / Bili: NEGATIVE / Urobili: 0.2 mg/dL   Blood: MODERATE / Protein: 30 mg/dL / Nitrite: NEGATIVE   Leuk Esterase: MODERATE / RBC: 25-50 / WBC >50   Sq Epi:  / Non Sq Epi:  / Bacteria: FEW

## 2018-05-29 LAB
BACTERIA BLD CULT: SIGNIFICANT CHANGE UP
BACTERIA BLD CULT: SIGNIFICANT CHANGE UP
BUN SERPL-MCNC: 28 MG/DL — HIGH (ref 7–23)
CALCIUM SERPL-MCNC: 9.2 MG/DL — SIGNIFICANT CHANGE UP (ref 8.4–10.5)
CHLORIDE SERPL-SCNC: 100 MMOL/L — SIGNIFICANT CHANGE UP (ref 98–107)
CO2 SERPL-SCNC: 20 MMOL/L — LOW (ref 22–31)
CREAT SERPL-MCNC: 1.37 MG/DL — HIGH (ref 0.5–1.3)
GLUCOSE SERPL-MCNC: 103 MG/DL — HIGH (ref 70–99)
HCT VFR BLD CALC: 38.3 % — LOW (ref 39–50)
HGB BLD-MCNC: 12.4 G/DL — LOW (ref 13–17)
MAGNESIUM SERPL-MCNC: 2 MG/DL — SIGNIFICANT CHANGE UP (ref 1.6–2.6)
MCHC RBC-ENTMCNC: 28.6 PG — SIGNIFICANT CHANGE UP (ref 27–34)
MCHC RBC-ENTMCNC: 32.4 % — SIGNIFICANT CHANGE UP (ref 32–36)
MCV RBC AUTO: 88.5 FL — SIGNIFICANT CHANGE UP (ref 80–100)
NRBC # FLD: 0 — SIGNIFICANT CHANGE UP
PLATELET # BLD AUTO: 254 K/UL — SIGNIFICANT CHANGE UP (ref 150–400)
PMV BLD: 10 FL — SIGNIFICANT CHANGE UP (ref 7–13)
POTASSIUM SERPL-MCNC: 4.9 MMOL/L — SIGNIFICANT CHANGE UP (ref 3.5–5.3)
POTASSIUM SERPL-SCNC: 4.9 MMOL/L — SIGNIFICANT CHANGE UP (ref 3.5–5.3)
RBC # BLD: 4.33 M/UL — SIGNIFICANT CHANGE UP (ref 4.2–5.8)
RBC # FLD: 12.1 % — SIGNIFICANT CHANGE UP (ref 10.3–14.5)
SODIUM SERPL-SCNC: 135 MMOL/L — SIGNIFICANT CHANGE UP (ref 135–145)
WBC # BLD: 7.9 K/UL — SIGNIFICANT CHANGE UP (ref 3.8–10.5)
WBC # FLD AUTO: 7.9 K/UL — SIGNIFICANT CHANGE UP (ref 3.8–10.5)

## 2018-05-29 RX ADMIN — Medication 1 SPRAY(S): at 16:54

## 2018-05-29 RX ADMIN — ERTAPENEM SODIUM 100 MILLIGRAM(S): 1 INJECTION, POWDER, LYOPHILIZED, FOR SOLUTION INTRAMUSCULAR; INTRAVENOUS at 16:54

## 2018-05-29 RX ADMIN — FINASTERIDE 5 MILLIGRAM(S): 5 TABLET, FILM COATED ORAL at 12:31

## 2018-05-29 RX ADMIN — Medication 1 SPRAY(S): at 06:09

## 2018-05-29 RX ADMIN — BUDESONIDE AND FORMOTEROL FUMARATE DIHYDRATE 2 PUFF(S): 160; 4.5 AEROSOL RESPIRATORY (INHALATION) at 21:06

## 2018-05-29 RX ADMIN — HEPARIN SODIUM 5000 UNIT(S): 5000 INJECTION INTRAVENOUS; SUBCUTANEOUS at 06:09

## 2018-05-29 RX ADMIN — BUDESONIDE AND FORMOTEROL FUMARATE DIHYDRATE 2 PUFF(S): 160; 4.5 AEROSOL RESPIRATORY (INHALATION) at 08:14

## 2018-05-29 RX ADMIN — MONTELUKAST 10 MILLIGRAM(S): 4 TABLET, CHEWABLE ORAL at 12:31

## 2018-05-29 NOTE — PROGRESS NOTE ADULT - SUBJECTIVE AND OBJECTIVE BOX
Patient seen and examined at bedside  No acute events noted overnight  Case discussed with medical team    HPI:  77 y/o M with HTN, BPH, CVA in 1998 without residual deficits, and asthma presents to the ED after he was called back for ESBL E coli in urine.  Pt had recent Rezum procedure done by his urologist for BPH.  Had Collado placed 2 weeks ago by urologist, and was given a 1 week course of Bactrim.  He had Collado removed after 1 week, but 3 days ago, developed suprapubic pain, and urinary obstruction, and came to the ED.  He had Collado placed, and was discharged home.  Pt reports no complaints since discharge.  No fevers, chills, or flank pain.  Has had normal clear yellow urine from catheter.      In the ED, pt was given ertapenem, and evaluated by ID. (24 May 2018 18:33)      PAST MEDICAL & SURGICAL HISTORY:  High cholesterol  Diabetes  CVA (cerebral infarction)  Hypertension  No significant past surgical history      No Known Allergies       MEDICATIONS  (STANDING):  buDESOnide 160 MICROgram(s)/formoterol 4.5 MICROgram(s) Inhaler 2 Puff(s) Inhalation two times a day  ertapenem  IVPB 500 milliGRAM(s) IV Intermittent every 24 hours  finasteride 5 milliGRAM(s) Oral daily  fluticasone propionate 50 MICROgram(s)/spray Nasal Spray 1 Spray(s) Both Nostrils two times a day  heparin  Injectable 5000 Unit(s) SubCutaneous every 8 hours  montelukast 10 milliGRAM(s) Oral daily    MEDICATIONS  (PRN):      REVIEW OF SYSTEMS:  CONSTITUTIONAL: (+) malaise.   EYES: No acute change in vision   ENT:  No tinnitus  NECK: No stiffness  RESPIRATORY: No hemoptysis  CARDIOVASCULAR: No chest pain, palpitations, syncope  GASTROINTESTINAL: No hematemesis, diarrhea, melena, or hematochezia.  GENITOURINARY: No hematuria  NEUROLOGICAL: No headaches  LYMPH Nodes: No enlarged glands  ENDOCRINE: No heat or cold intolerance	    T(C): 36.5 (05-29-18 @ 06:07), Max: 36.7 (05-28-18 @ 21:12)  HR: 72 (05-29-18 @ 06:07) (72 - 81)  BP: 141/85 (05-29-18 @ 06:07) (141/85 - 142/60)  RR: 18 (05-29-18 @ 06:07) (18 - 18)  SpO2: 97% (05-29-18 @ 06:07) (97% - 100%)    PHYSICAL EXAMINATION:   Constitutional: WD, NAD  HEENT: NC, AT  Neck:  Supple  Respiratory:  Adequate airflow b/l. Not using accessory muscles of respiration.  Cardiovascular:  S1 & S2 intact, no R/G, 2+ radial pulses b/l  Gastrointestinal: Soft, NT, ND, normoactive b.s., no organomegaly/RT/rigidity  Extremities: WWP  Neurological:  Alert and awake.  No acute focal motor deficits. Crude sensation intact.     Labs and imaging reviewed    LABS:                        12.4   7.90  )-----------( 254      ( 29 May 2018 05:55 )             38.3     05-29    135  |  100  |  28<H>  ----------------------------<  103<H>  4.9   |  20<L>  |  1.37<H>    Ca    9.2      29 May 2018 05:55  Mg     2.0     05-29              CAPILLARY BLOOD GLUCOSE                  RADIOLOGY & ADDITIONAL STUDIES:

## 2018-05-29 NOTE — PROGRESS NOTE ADULT - SUBJECTIVE AND OBJECTIVE BOX
[facility] NEPHROLOGY- CONSULTATION NOTE    78y Male with history of below presents with ESBL in urine culture. Nephrology consulted for elevated Scr.    Patient is unaware of prior history of kidney disease and has never seen a nephrologist in the past. However, as per our records, patient appears to have a history of CKD-3 with baseline Scr 1.5-1.6. Patient on enalapril 10 mg twice daily as outpatient. Patient denies any h/o nephrolithiasis, hepatitis B, C, HIV, IVDA, tattoos, prior PRBC transfusions, herbal medication use or chronic NSAID use. Patient denies any family h/o kidney disease.    Pt reports polyuria once tavarez catheter was removed, but now back to usual urine output.      REVIEW OF SYSTEMS:  Gen: no fever  Cards: no chest pain  Resp: no dyspnea  GI: no nausea or vomiting or diarrhea  : no dysuria or hematuria  Vascular: no LE edema    No Known Allergies    VITALS:  T(F): 97.7 (05-29-18 @ 06:07), Max: 98 (05-28-18 @ 21:12)  HR: 72 (05-29-18 @ 06:07)  BP: 141/85 (05-29-18 @ 06:07)  RR: 18 (05-29-18 @ 06:07)  SpO2: 97% (05-29-18 @ 06:07)  Wt(kg): --    05-28 @ 07:01  -  05-29 @ 07:00  --------------------------------------------------------  IN: 0 mL / OUT: 900 mL / NET: -900 mL      Height (cm): 177.8 (05-28 @ 21:12)  Weight (kg): 67.1 (05-28 @ 21:12)  BMI (kg/m2): 21.2 (05-28 @ 21:12)  BSA (m2): 1.84 (05-28 @ 21:12)      PHYSICAL EXAM:  Gen: NAD, calm  HEENT: MMM  Neck: no JVD  Cards: RRR, +S1/S2, no M/G/R  Resp: CTA B/L  GI: soft, NT/ND, NABS  : no CVA tenderness, no tavarez (it was removed yesterday)  Vascular: no LE edema B/L  Derm: no rashes  Neuro: non-focal      LABS:  05-29    135  |  100  |  28<H>  ----------------------------<  103<H>  4.9   |  20<L>  |  1.37<H>    Ca    9.2      29 May 2018 05:55  Mg     2.0     05-29      Creatinine Trend: 1.37 <--, 1.39 <--, 1.54 <--, 1.65 <--, 1.80 <--, 1.97 <--                        12.4   7.90  )-----------( 254      ( 29 May 2018 05:55 )             38.3

## 2018-05-30 LAB
BUN SERPL-MCNC: 32 MG/DL — HIGH (ref 7–23)
CALCIUM SERPL-MCNC: 9.4 MG/DL — SIGNIFICANT CHANGE UP (ref 8.4–10.5)
CHLORIDE SERPL-SCNC: 100 MMOL/L — SIGNIFICANT CHANGE UP (ref 98–107)
CO2 SERPL-SCNC: 23 MMOL/L — SIGNIFICANT CHANGE UP (ref 22–31)
CREAT SERPL-MCNC: 1.45 MG/DL — HIGH (ref 0.5–1.3)
GLUCOSE SERPL-MCNC: 100 MG/DL — HIGH (ref 70–99)
HCT VFR BLD CALC: 41 % — SIGNIFICANT CHANGE UP (ref 39–50)
HGB BLD-MCNC: 13.2 G/DL — SIGNIFICANT CHANGE UP (ref 13–17)
MAGNESIUM SERPL-MCNC: 2 MG/DL — SIGNIFICANT CHANGE UP (ref 1.6–2.6)
MCHC RBC-ENTMCNC: 28.7 PG — SIGNIFICANT CHANGE UP (ref 27–34)
MCHC RBC-ENTMCNC: 32.2 % — SIGNIFICANT CHANGE UP (ref 32–36)
MCV RBC AUTO: 89.1 FL — SIGNIFICANT CHANGE UP (ref 80–100)
NRBC # FLD: 0 — SIGNIFICANT CHANGE UP
PHOSPHATE SERPL-MCNC: 4.1 MG/DL — SIGNIFICANT CHANGE UP (ref 2.5–4.5)
PLATELET # BLD AUTO: 265 K/UL — SIGNIFICANT CHANGE UP (ref 150–400)
PMV BLD: 9.9 FL — SIGNIFICANT CHANGE UP (ref 7–13)
POTASSIUM SERPL-MCNC: 4.9 MMOL/L — SIGNIFICANT CHANGE UP (ref 3.5–5.3)
POTASSIUM SERPL-SCNC: 4.9 MMOL/L — SIGNIFICANT CHANGE UP (ref 3.5–5.3)
RBC # BLD: 4.6 M/UL — SIGNIFICANT CHANGE UP (ref 4.2–5.8)
RBC # FLD: 12.3 % — SIGNIFICANT CHANGE UP (ref 10.3–14.5)
SODIUM SERPL-SCNC: 135 MMOL/L — SIGNIFICANT CHANGE UP (ref 135–145)
WBC # BLD: 7.66 K/UL — SIGNIFICANT CHANGE UP (ref 3.8–10.5)
WBC # FLD AUTO: 7.66 K/UL — SIGNIFICANT CHANGE UP (ref 3.8–10.5)

## 2018-05-30 PROCEDURE — 99232 SBSQ HOSP IP/OBS MODERATE 35: CPT

## 2018-05-30 RX ADMIN — HEPARIN SODIUM 5000 UNIT(S): 5000 INJECTION INTRAVENOUS; SUBCUTANEOUS at 05:36

## 2018-05-30 RX ADMIN — BUDESONIDE AND FORMOTEROL FUMARATE DIHYDRATE 2 PUFF(S): 160; 4.5 AEROSOL RESPIRATORY (INHALATION) at 21:04

## 2018-05-30 RX ADMIN — MONTELUKAST 10 MILLIGRAM(S): 4 TABLET, CHEWABLE ORAL at 11:16

## 2018-05-30 RX ADMIN — HEPARIN SODIUM 5000 UNIT(S): 5000 INJECTION INTRAVENOUS; SUBCUTANEOUS at 21:04

## 2018-05-30 RX ADMIN — BUDESONIDE AND FORMOTEROL FUMARATE DIHYDRATE 2 PUFF(S): 160; 4.5 AEROSOL RESPIRATORY (INHALATION) at 08:42

## 2018-05-30 RX ADMIN — HEPARIN SODIUM 5000 UNIT(S): 5000 INJECTION INTRAVENOUS; SUBCUTANEOUS at 11:16

## 2018-05-30 RX ADMIN — Medication 1 SPRAY(S): at 17:48

## 2018-05-30 RX ADMIN — FINASTERIDE 5 MILLIGRAM(S): 5 TABLET, FILM COATED ORAL at 11:16

## 2018-05-30 RX ADMIN — Medication 1 SPRAY(S): at 05:36

## 2018-05-30 RX ADMIN — ERTAPENEM SODIUM 100 MILLIGRAM(S): 1 INJECTION, POWDER, LYOPHILIZED, FOR SOLUTION INTRAMUSCULAR; INTRAVENOUS at 17:49

## 2018-05-30 NOTE — PROGRESS NOTE ADULT - SUBJECTIVE AND OBJECTIVE BOX
[facility] NEPHROLOGY- CONSULTATION NOTE    78y Male with history of below presents with ESBL in urine culture. Nephrology consulted for elevated Scr.    Patient is unaware of prior history of kidney disease and has never seen a nephrologist in the past. However, as per our records, patient appears to have a history of CKD-3 with baseline Scr 1.5-1.6. Patient on enalapril 10 mg twice daily as outpatient. Patient denies any h/o nephrolithiasis, hepatitis B, C, HIV, IVDA, tattoos, prior PRBC transfusions, herbal medication use or chronic NSAID use. Patient denies any family h/o kidney disease.    Pt feels okay    REVIEW OF SYSTEMS:  Gen: no fever  Cards: no chest pain  Resp: no dyspnea  GI: no nausea or vomiting or diarrhea  : no dysuria or hematuria  Vascular: no LE edema    No Known Allergies    VITALS:  T(F): 98.4 (05-30-18 @ 12:03), Max: 98.5 (05-29-18 @ 21:04)  HR: 77 (05-30-18 @ 12:03)  BP: 151/90 (05-30-18 @ 12:03)  RR: 17 (05-30-18 @ 12:03)  SpO2: 99% (05-30-18 @ 12:03)  Wt(kg): --      PHYSICAL EXAM:  Gen: NAD, calm  HEENT: MMM  Neck: no JVD  Cards: RRR, +S1/S2, no M/G/R  Resp: CTA B/L  GI: soft, NT/ND, NABS  : no CVA tenderness, no tavarez (it was removed yesterday)  Vascular: no LE edema B/L      LABS:  05-30    135  |  100  |  32<H>  ----------------------------<  100<H>  4.9   |  23  |  1.45<H>    Ca    9.4      30 May 2018 06:08  Phos  4.1     05-30  Mg     2.0     05-30      Creatinine Trend: 1.45 <--, 1.37 <--, 1.39 <--, 1.54 <--, 1.65 <--, 1.80 <--, 1.97 <--                        13.2   7.66  )-----------( 265      ( 30 May 2018 06:08 )             41.0

## 2018-05-30 NOTE — PROGRESS NOTE ADULT - SUBJECTIVE AND OBJECTIVE BOX
Follow Up:  ESBL UTI    Inverval History/ROS: feels well.  Tavarez removed 2 days ago; voiding well.    Allergies  No Known Allergies        ANTIMICROBIALS:  ertapenem  IVPB 500 every 24 hours      OTHER MEDS:  buDESOnide 160 MICROgram(s)/formoterol 4.5 MICROgram(s) Inhaler 2 Puff(s) Inhalation two times a day  finasteride 5 milliGRAM(s) Oral daily  fluticasone propionate 50 MICROgram(s)/spray Nasal Spray 1 Spray(s) Both Nostrils two times a day  heparin  Injectable 5000 Unit(s) SubCutaneous every 8 hours  montelukast 10 milliGRAM(s) Oral daily      Vital Signs Last 24 Hrs  T(C): 36.9 (30 May 2018 12:03), Max: 36.9 (29 May 2018 21:04)  T(F): 98.4 (30 May 2018 12:03), Max: 98.5 (29 May 2018 21:04)  HR: 77 (30 May 2018 12:03) (72 - 78)  BP: 151/90 (30 May 2018 12:03) (138/84 - 151/90)  BP(mean): --  RR: 17 (30 May 2018 12:03) (17 - 18)  SpO2: 99% (30 May 2018 12:03) (98% - 99%)    PHYSICAL EXAM:  General:  non-toxic, ambulating in room  HEAD/EYES:  white sclera   ENT:   supple  Cardiovascular:   normal   Respiratory:  clear to ausculation bilaterally  GI:   soft, non-tender, normal bowel sounds  :  no tavarez no CVA tenderness   Musculoskeletal:   no synovitis  Neurologic:   non-focal exam   Skin:  no hollie  Psychiatric:  appropriate affect  alert & oriented  Lines:  no phlebitis                                 13.2   7.66  )-----------( 265      ( 30 May 2018 06:08 )             41.0       05-30    135  |  100  |  32<H>  ----------------------------<  100<H>  4.9   |  23  |  1.45<H>    Ca    9.4      30 May 2018 06:08  Phos  4.1     05-30  Mg     2.0     05-30            MICROBIOLOGY:  v  BLOOD PERIPHERAL  05-24-18 --  --  --      URINE CATHETER  05-21-18 --  --  E.COLI ESBL POSITIVE                RADIOLOGY:

## 2018-05-30 NOTE — PROGRESS NOTE ADULT - SUBJECTIVE AND OBJECTIVE BOX
Patient seen and examined at bedside  No acute events noted overnight  Case discussed with medical team    HPI:  77 y/o M with HTN, BPH, CVA in 1998 without residual deficits, and asthma presents to the ED after he was called back for ESBL E coli in urine.  Pt had recent Rezum procedure done by his urologist for BPH.  Had Collado placed 2 weeks ago by urologist, and was given a 1 week course of Bactrim.  He had Collado removed after 1 week, but 3 days ago, developed suprapubic pain, and urinary obstruction, and came to the ED.  He had Collado placed, and was discharged home.  Pt reports no complaints since discharge.  No fevers, chills, or flank pain.  Has had normal clear yellow urine from catheter.      In the ED, pt was given ertapenem, and evaluated by ID. (24 May 2018 18:33)      PAST MEDICAL & SURGICAL HISTORY:  High cholesterol  Diabetes  CVA (cerebral infarction)  Hypertension  No significant past surgical history      No Known Allergies       MEDICATIONS  (STANDING):  buDESOnide 160 MICROgram(s)/formoterol 4.5 MICROgram(s) Inhaler 2 Puff(s) Inhalation two times a day  ertapenem  IVPB 500 milliGRAM(s) IV Intermittent every 24 hours  finasteride 5 milliGRAM(s) Oral daily  fluticasone propionate 50 MICROgram(s)/spray Nasal Spray 1 Spray(s) Both Nostrils two times a day  heparin  Injectable 5000 Unit(s) SubCutaneous every 8 hours  montelukast 10 milliGRAM(s) Oral daily    MEDICATIONS  (PRN):      REVIEW OF SYSTEMS:  CONSTITUTIONAL: (+) malaise.   EYES: No acute change in vision   ENT:  No tinnitus  NECK: No stiffness  RESPIRATORY: No hemoptysis  CARDIOVASCULAR: No chest pain, palpitations, syncope  GASTROINTESTINAL: No hematemesis, diarrhea, melena, or hematochezia.  GENITOURINARY: No hematuria  NEUROLOGICAL: No headaches  LYMPH Nodes: No enlarged glands  ENDOCRINE: No heat or cold intolerance	    T(C): 36.9 (05-30-18 @ 12:03), Max: 36.9 (05-29-18 @ 21:04)  HR: 77 (05-30-18 @ 12:03) (72 - 78)  BP: 151/90 (05-30-18 @ 12:03) (138/84 - 151/90)  RR: 17 (05-30-18 @ 12:03) (17 - 18)  SpO2: 99% (05-30-18 @ 12:03) (98% - 99%)    PHYSICAL EXAMINATION:   Constitutional: WD, NAD  HEENT: NC, AT  Neck:  Supple  Respiratory:  Adequate airflow b/l. Not using accessory muscles of respiration.  Cardiovascular:  S1 & S2 intact, no R/G, 2+ radial pulses b/l  Gastrointestinal: Soft, NT, ND, normoactive b.s., no organomegaly/RT/rigidity  Extremities: WWP  Neurological:  Alert and awake.  No acute focal motor deficits. Crude sensation intact.     Labs and imaging reviewed    LABS:                        13.2   7.66  )-----------( 265      ( 30 May 2018 06:08 )             41.0     05-30    135  |  100  |  32<H>  ----------------------------<  100<H>  4.9   |  23  |  1.45<H>    Ca    9.4      30 May 2018 06:08  Phos  4.1     05-30  Mg     2.0     05-30              CAPILLARY BLOOD GLUCOSE                  RADIOLOGY & ADDITIONAL STUDIES:

## 2018-05-31 VITALS
OXYGEN SATURATION: 99 % | SYSTOLIC BLOOD PRESSURE: 132 MMHG | RESPIRATION RATE: 18 BRPM | DIASTOLIC BLOOD PRESSURE: 72 MMHG | HEART RATE: 75 BPM | TEMPERATURE: 98 F

## 2018-05-31 RX ADMIN — Medication 1 SPRAY(S): at 06:23

## 2018-05-31 RX ADMIN — HEPARIN SODIUM 5000 UNIT(S): 5000 INJECTION INTRAVENOUS; SUBCUTANEOUS at 05:24

## 2018-05-31 NOTE — PROGRESS NOTE ADULT - PROBLEM SELECTOR PLAN 5
Due to renal insufficiency in setting of ACE-I use (now discontinued) and high potassium diet as patient admits to eating bananas and oranges and tomatoes. Patient educated on low potassium diet as outpatient. Diet to low potassium. Monitor serum potassium.
Due to renal insufficiency in setting of ACE-I use (now discontinued) and high potassium diet as patient admits to eating bananas and oranges and tomatoes. Patient educated on low potassium diet as outpatient. Diet to low potassium. Monitor serum potassium.
dvt/gi ppx
Due to renal insufficiency in setting of ACE-I use (now discontinued) and high potassium diet as patient admits to eating bananas and oranges and tomatoes. Patient educated on low potassium diet as outpatient. Diet to low potassium. Monitor serum potassium.

## 2018-05-31 NOTE — PROGRESS NOTE ADULT - PROBLEM SELECTOR PLAN 3
BP borderline. Change diet to low sodium. Holding enalapril given POLO and hyperkalemia. If BP increases, would consider CCB as needed. Monitor BP.
- continue advair
continue advair
- continue advair
BP borderline. Change diet to low sodium. Holding enalapril given POLO and hyperkalemia. If BP increases, would consider CCB as needed. Monitor BP.
continue advair
BP borderline. Change diet to low sodium. Holding enalapril given POLO and hyperkalemia. If BP increases, would consider CCB as needed. Monitor BP.

## 2018-05-31 NOTE — PROGRESS NOTE ADULT - PROBLEM SELECTOR PROBLEM 3
Hypertensive kidney disease with chronic kidney disease, stage 1 through stage 4 or unspecified 
Asthma, unspecified asthma severity, unspecified whether complicated, unspecified whether persistent
Hypertensive kidney disease with chronic kidney disease, stage 1 through stage 4 or unspecified 

## 2018-05-31 NOTE — PROGRESS NOTE ADULT - PROBLEM SELECTOR PLAN 1
- continue ertapenem  adjust abx per id recs  contact precautions
awaiting ID f/u   abx per ID  d/c home pending ID
- continue ertapenem  adjust abx per id recs  contact precautions
-continue ertapenem, adjust abx per id recs, d/c planning home when cleared by ID  -outpt pmd/nephro f/u
Patient with POLO on CKD-3 in setting of infection and ACE-I now improving. Avoid nephrotoxins.
Patient with POLO on CKD-3 in setting of infection and ACE-I now improving. Avoid nephrotoxins.
Patient with POLO on CKD-3 in setting of infection and ACE-I now improving. Avoid nephrotoxins.  Monitor renal function.
Patient with POLO on CKD-3 in setting of infection and ACE-I now improving. Avoid nephrotoxins.  Monitor renal function.
continue ertapenem  adjust abx per id recs  contact precautions
continue ertapenem  adjust abx per id recs  contact precautions
Patient with POLO on CKD-3 in setting of infection and ACE-I now improving. Avoid nephrotoxins.  Monitor renal function.
Patient with POLO on CKD-3 in setting of infection and ACE-I now improving. Avoid nephrotoxins.

## 2018-05-31 NOTE — PROGRESS NOTE ADULT - ASSESSMENT
79 y/o M with HTN, asthma, BPH, and CVA presents after recent ER visit for urinary obstruction, now with urine culture positive for ESBL E coli.
79 y/o M with HTN, asthma, BPH, and CVA presents after recent ER visit for urinary obstruction, now with urine culture positive for ESBL E coli.
ESBL E coli UTI  day # 8 ertapenem  clinically improved  afebrile normal wbc  blood culture negative    would complete ertapenem today    please call if further questions
77 y/o M with HTN, asthma, BPH, and CVA presents after recent ER visit for urinary obstruction, now with urine culture positive for ESBL E coli.
77 y/o M with HTN, asthma, BPH, and CVA presents after recent ER visit for urinary obstruction, now with urine culture positive for ESBL E coli.
78 male with DM, CVA, HTN and BPH with a tavarez catheter called back for esbl e.coli in his urine culture. he was recently treated with antibiotic.  Patient has been afebrile with no leukocytosis with no hypotension and no tachycardia.   UA positive    POLO improving     ESBL E.coli in urine culture in a patient with urinary retention with a tavarez catheter   f/u blood cultures  continue ertapenem for now
78y Male with history of HTN presents with ESBL in urine culture. Nephrology consulted for elevated Scr.
79 y/o M with HTN, asthma, BPH, and CVA presents after recent ER visit for urinary obstruction, now with urine culture positive for ESBL E coli.
79 y/o M with HTN, asthma, BPH, and CVA presents after recent ER visit for urinary obstruction, now with urine culture positive for ESBL E coli.
78y Male with history of HTN presents with ESBL in urine culture. Nephrology consulted for elevated Scr.
78y Male with history of HTN presents with ESBL in urine culture. Nephrology consulted for elevated Scr.

## 2018-05-31 NOTE — PROGRESS NOTE ADULT - SUBJECTIVE AND OBJECTIVE BOX
[facility] NEPHROLOGY- CONSULTATION NOTE    78y Male with history of below presents with ESBL in urine culture. Nephrology consulted for elevated Scr.    Patient is unaware of prior history of kidney disease and has never seen a nephrologist in the past. However, as per our records, patient appears to have a history of CKD-3 with baseline Scr 1.5-1.6. Patient on enalapril 10 mg twice daily as outpatient. Patient denies any h/o nephrolithiasis, hepatitis B, C, HIV, IVDA, tattoos, prior PRBC transfusions, herbal medication use or chronic NSAID use. Patient denies any family h/o kidney disease.    Pt feels fine.    REVIEW OF SYSTEMS:  Gen: no fever  Cards: no chest pain  Resp: no dyspnea  GI: no nausea or vomiting or diarrhea  : no dysuria or hematuria  Vascular: no LE edema    No Known Allergies    VITALS:  T(F): 98 (05-31-18 @ 05:43), Max: 98.7 (05-30-18 @ 22:56)  HR: 75 (05-31-18 @ 05:43)  BP: 132/72 (05-31-18 @ 05:43)  RR: 18 (05-31-18 @ 05:43)  SpO2: 99% (05-31-18 @ 05:43)  Wt(kg): --      PHYSICAL EXAM:  Gen: NAD, calm  HEENT: MMM  Neck: no JVD  Cards: RRR, +S1/S2, no M/G/R  Resp: CTA B/L  GI: soft, NT/ND, NABS  : no CVA tenderness, no tavarez (it was removed yesterday)  Vascular: no LE edema B/L    LABS: labs pending today  05-30    135  |  100  |  32<H>  ----------------------------<  100<H>  4.9   |  23  |  1.45<H>    Ca    9.4      30 May 2018 06:08  Phos  4.1     05-30  Mg     2.0     05-30      Creatinine Trend: 1.45 <--, 1.37 <--, 1.39 <--, 1.54 <--, 1.65 <--, 1.80 <--, 1.97 <--                        13.2   7.66  )-----------( 265      ( 30 May 2018 06:08 )             41.0

## 2018-05-31 NOTE — PROGRESS NOTE ADULT - PROBLEM SELECTOR PROBLEM 4
Urinary retention due to benign prostatic hyperplasia
Hyperkalemia
Prophylactic measure
Urinary retention due to benign prostatic hyperplasia

## 2018-05-31 NOTE — PROGRESS NOTE ADULT - PROVIDER SPECIALTY LIST ADULT
Infectious Disease
Internal Medicine
Nephrology
Infectious Disease
Nephrology
Nephrology
Internal Medicine
Internal Medicine

## 2018-05-31 NOTE — PROGRESS NOTE ADULT - PROBLEM SELECTOR PLAN 2
Patient with h/o CKD-3 (baseline Scr 1.5-1.6). Patient will need outpatient CKD work up once UTI treated. Renal US normal. Monitor electrolytes.
holding acei  adjust rx prn   monitor vitals
holding acei  adjust rx prn   monitor vitals
Patient with h/o CKD-3 (baseline Scr 1.5-1.6). Patient will need outpatient CKD work up once UTI treated. Renal US normal. Monitor electrolytes.
Patient with h/o CKD-3 (baseline Scr 1.5-1.6). Patient will need outpatient CKD work up once UTI treated. Renal US normal. Monitor electrolytes.
Patient with h/o CKD-3 (baseline Scr 1.5-1.6). Patient will need outpatient CKD work up once UTI treated. Renal US normal. Monitor electrolytes.  Can f/u in our office in 1-2 weeks.
Patient with h/o CKD-3 (baseline Scr 1.5-1.6). Patient will need outpatient CKD work up once UTI treated. Renal US normal. Monitor electrolytes.  Can f/u in our office in 1-2 weeks.
holding acei  adjust rx prn   monitor vitals
Patient with h/o CKD-3 (baseline Scr 1.5-1.6). Patient will need outpatient CKD work up once UTI treated. Renal US normal. Monitor electrolytes.  Can f/u in our office in 1-2 weeks.

## 2018-05-31 NOTE — PROGRESS NOTE ADULT - PROBLEM SELECTOR PROBLEM 1
ESBL (extended spectrum beta-lactamase) producing bacteria infection
ESBL (extended spectrum beta-lactamase) producing bacteria infection
Acute kidney injury
ESBL (extended spectrum beta-lactamase) producing bacteria infection
Acute kidney injury
Acute kidney injury

## 2018-05-31 NOTE — PROGRESS NOTE ADULT - PROBLEM SELECTOR PLAN 4
Patient on IV abx as per ID. Collado as per urology.
dvt/gi ppx
improved  monitor bmp
Patient on IV abx as per ID. Collado as per urology.
dvt/gi ppx
Patient on IV abx as per ID. Collado as per urology.

## 2018-05-31 NOTE — PROGRESS NOTE ADULT - PROBLEM SELECTOR PROBLEM 2
Essential hypertension
Essential hypertension
CKD (chronic kidney disease), stage III
Essential hypertension
CKD (chronic kidney disease), stage III
CKD (chronic kidney disease), stage III

## 2018-05-31 NOTE — PROGRESS NOTE ADULT - ATTENDING COMMENTS
[facility] NEPHROLOGY  Dustin Talavera M.D.  Renzo Larsen D.O.  More Live M.D.  Niyah Larry, MSN, ANP-C    Telephone: [phone]  Facsimile: [phone]    [address] [place] [address]
79 yo man with ESBL UTI.  ?chills last night; otherwise appears stable. day # 2 ertapenem.  blood cultures testing.  If blood cultures negative x 48 hours may transition to fosfomycin 3 gm x 1 dose to complete tx outpatient.    ID service will follow over weekend.
[facility] NEPHROLOGY  Dustin Talavera M.D.  Renzo Larsen D.O.  More Live M.D.  Niyah Larry, MSN, ANP-C    Telephone: [phone]  Facsimile: [phone]    [address] [place] [address]
[facility] NEPHROLOGY  Dustin Talavera M.D.  Renzo Larsen D.O.  More Live M.D.  Niyah Larry, MSN, ANP-C    Telephone: [phone]  Facsimile: [phone]    [address] [place] [address]
[facility] NEPHROLOGY  Dustin Talavera M.D.  Renzo Larsen D.O.  More Live M.D.  Niyah Larry, MSN, ANP-C    Telephone: [phone]  Facsimile: [phone]    [address] [place] [address]
[facility] NEPHROLOGY  Dustin Talavera M.D.  Renzo Larsen D.O.  More Live M.D.  Niyah Larry, MSN, ANP-C    Telephone: [phone]  Facsimile: [phone]    [address] [place] [address]
[facility] NEPHROLOGY  Dsutin Talavera M.D.  Renzo Larsen D.O.  More Live M.D.  Niyah Larry, MSN, ANP-C    Telephone: [phone]  Facsimile: [phone]    [address] [place] [address]

## 2018-06-01 PROCEDURE — G9005: CPT

## 2018-07-01 ENCOUNTER — OUTPATIENT (OUTPATIENT)
Dept: OUTPATIENT SERVICES | Facility: HOSPITAL | Age: 78
LOS: 1 days | End: 2018-07-01
Payer: MEDICARE

## 2018-07-19 DIAGNOSIS — Z71.89 OTHER SPECIFIED COUNSELING: ICD-10-CM

## 2018-07-20 ENCOUNTER — APPOINTMENT (OUTPATIENT)
Dept: DERMATOLOGY | Facility: CLINIC | Age: 78
End: 2018-07-20
Payer: MEDICARE

## 2018-07-20 VITALS — DIASTOLIC BLOOD PRESSURE: 68 MMHG | SYSTOLIC BLOOD PRESSURE: 132 MMHG

## 2018-07-20 PROCEDURE — 99214 OFFICE O/P EST MOD 30 MIN: CPT

## 2018-07-27 ENCOUNTER — APPOINTMENT (OUTPATIENT)
Dept: DERMATOLOGY | Facility: CLINIC | Age: 78
End: 2018-07-27

## 2018-08-24 ENCOUNTER — APPOINTMENT (OUTPATIENT)
Dept: DERMATOLOGY | Facility: CLINIC | Age: 78
End: 2018-08-24
Payer: MEDICARE

## 2018-08-24 VITALS — DIASTOLIC BLOOD PRESSURE: 66 MMHG | SYSTOLIC BLOOD PRESSURE: 128 MMHG

## 2018-08-24 PROCEDURE — 99214 OFFICE O/P EST MOD 30 MIN: CPT

## 2018-09-17 ENCOUNTER — APPOINTMENT (OUTPATIENT)
Dept: DERMATOLOGY | Facility: CLINIC | Age: 78
End: 2018-09-17
Payer: MEDICARE

## 2018-09-17 VITALS — SYSTOLIC BLOOD PRESSURE: 144 MMHG | DIASTOLIC BLOOD PRESSURE: 80 MMHG

## 2018-09-17 PROCEDURE — 99213 OFFICE O/P EST LOW 20 MIN: CPT | Mod: 25

## 2018-09-17 PROCEDURE — 11900 INJECT SKIN LESIONS </W 7: CPT

## 2018-09-18 ENCOUNTER — APPOINTMENT (OUTPATIENT)
Dept: PULMONOLOGY | Facility: CLINIC | Age: 78
End: 2018-09-18
Payer: MEDICARE

## 2018-09-18 VITALS
HEART RATE: 70 BPM | BODY MASS INDEX: 30.68 KG/M2 | DIASTOLIC BLOOD PRESSURE: 80 MMHG | OXYGEN SATURATION: 97 % | SYSTOLIC BLOOD PRESSURE: 122 MMHG | WEIGHT: 220 LBS

## 2018-09-18 DIAGNOSIS — R06.2 WHEEZING: ICD-10-CM

## 2018-09-18 PROCEDURE — 99214 OFFICE O/P EST MOD 30 MIN: CPT

## 2018-09-18 RX ORDER — ALBUTEROL SULFATE 90 UG/1
108 (90 BASE) AEROSOL, METERED RESPIRATORY (INHALATION)
Qty: 1 | Refills: 5 | Status: ACTIVE | COMMUNITY
Start: 2017-08-21

## 2018-09-29 PROBLEM — R06.2 WHEEZING: Status: ACTIVE | Noted: 2017-01-13

## 2018-10-22 ENCOUNTER — MEDICATION RENEWAL (OUTPATIENT)
Age: 78
End: 2018-10-22

## 2018-10-22 ENCOUNTER — APPOINTMENT (OUTPATIENT)
Dept: DERMATOLOGY | Facility: CLINIC | Age: 78
End: 2018-10-22
Payer: MEDICARE

## 2018-10-22 VITALS — SYSTOLIC BLOOD PRESSURE: 152 MMHG | DIASTOLIC BLOOD PRESSURE: 80 MMHG

## 2018-10-22 PROCEDURE — 99214 OFFICE O/P EST MOD 30 MIN: CPT

## 2018-10-26 ENCOUNTER — APPOINTMENT (OUTPATIENT)
Dept: DERMATOLOGY | Facility: CLINIC | Age: 78
End: 2018-10-26
Payer: MEDICARE

## 2018-10-26 VITALS — DIASTOLIC BLOOD PRESSURE: 80 MMHG | SYSTOLIC BLOOD PRESSURE: 140 MMHG

## 2018-10-26 DIAGNOSIS — L30.9 DERMATITIS, UNSPECIFIED: ICD-10-CM

## 2018-10-26 DIAGNOSIS — Z71.89 OTHER SPECIFIED COUNSELING: ICD-10-CM

## 2018-10-26 PROCEDURE — 96372 THER/PROPH/DIAG INJ SC/IM: CPT

## 2018-10-26 PROCEDURE — 99213 OFFICE O/P EST LOW 20 MIN: CPT | Mod: 25

## 2018-12-12 ENCOUNTER — MEDICATION RENEWAL (OUTPATIENT)
Age: 78
End: 2018-12-12

## 2018-12-22 NOTE — PATIENT PROFILE ADULT. - PAIN CHRONIC, PROFILE
CM lm for pt's daughter on 3 different numbers [phone], [phone], and [phone]  no

## 2018-12-28 ENCOUNTER — APPOINTMENT (OUTPATIENT)
Dept: DERMATOLOGY | Facility: CLINIC | Age: 78
End: 2018-12-28
Payer: MEDICARE

## 2018-12-28 VITALS — SYSTOLIC BLOOD PRESSURE: 144 MMHG | DIASTOLIC BLOOD PRESSURE: 70 MMHG

## 2018-12-28 PROCEDURE — 99213 OFFICE O/P EST LOW 20 MIN: CPT | Mod: 25

## 2018-12-28 PROCEDURE — 11900 INJECT SKIN LESIONS </W 7: CPT

## 2019-03-14 ENCOUNTER — APPOINTMENT (OUTPATIENT)
Dept: PULMONOLOGY | Facility: CLINIC | Age: 79
End: 2019-03-14
Payer: MEDICARE

## 2019-03-14 VITALS
WEIGHT: 205 LBS | OXYGEN SATURATION: 96 % | DIASTOLIC BLOOD PRESSURE: 78 MMHG | BODY MASS INDEX: 28.59 KG/M2 | HEART RATE: 67 BPM | SYSTOLIC BLOOD PRESSURE: 150 MMHG

## 2019-03-14 DIAGNOSIS — L81.0 POSTINFLAMMATORY HYPERPIGMENTATION: ICD-10-CM

## 2019-03-14 PROCEDURE — 99214 OFFICE O/P EST MOD 30 MIN: CPT

## 2019-03-14 RX ORDER — ALBUTEROL SULFATE 90 UG/1
108 (90 BASE) AEROSOL, METERED RESPIRATORY (INHALATION)
Qty: 3 | Refills: 1 | Status: ACTIVE | COMMUNITY
Start: 2019-03-14 | End: 1900-01-01

## 2019-03-14 NOTE — PHYSICAL EXAM
[General Appearance - Well Developed] : well developed [Normal Appearance] : normal appearance [Well Groomed] : well groomed [General Appearance - Well Nourished] : well nourished [No Deformities] : no deformities [General Appearance - In No Acute Distress] : no acute distress [Normal Conjunctiva] : the conjunctiva exhibited no abnormalities [Eyelids - No Xanthelasma] : the eyelids demonstrated no xanthelasmas [Normal Oropharynx] : normal oropharynx [Neck Appearance] : the appearance of the neck was normal [Neck Cervical Mass (___cm)] : no neck mass was observed [Jugular Venous Distention Increased] : there was no jugular-venous distention [Thyroid Diffuse Enlargement] : the thyroid was not enlarged [Thyroid Nodule] : there were no palpable thyroid nodules [Heart Rate And Rhythm] : heart rate and rhythm were normal [Heart Sounds] : normal S1 and S2 [Murmurs] : no murmurs present [Respiration, Rhythm And Depth] : normal respiratory rhythm and effort [Exaggerated Use Of Accessory Muscles For Inspiration] : no accessory muscle use [Scattered Wheezes] : scattered wheezing [Abdomen Soft] : soft [Abdomen Tenderness] : non-tender [Abdomen Mass (___ Cm)] : no abdominal mass palpated [Cyanosis, Localized] : no localized cyanosis [Petechial Hemorrhages (___cm)] : no petechial hemorrhages [FreeTextEntry1] : Mild digital clubbing [Skin Color & Pigmentation] : normal skin color and pigmentation [] : no rash [No Venous Stasis] : no venous stasis [Skin Lesions] : no skin lesions [No Skin Ulcers] : no skin ulcer [No Xanthoma] : no  xanthoma was observed [No Focal Deficits] : no focal deficits [Oriented To Time, Place, And Person] : oriented to person, place, and time [Impaired Insight] : insight and judgment were intact [Affect] : the affect was normal

## 2019-03-14 NOTE — DISCUSSION/SUMMARY
[FreeTextEntry1] : 77 yo male with hx of OAD with post infectious/inflammatory symptoms. Medrol pack was prescribed. He is to continue advair BID, singulair daily with PRN albuterol MDI. Treatment adjustment will depend on symptomatic needs. He is to follow up with his PMD as before.

## 2019-03-14 NOTE — REVIEW OF SYSTEMS
[Cough] : cough [Sputum] : sputum  [Hemoptysis] : no hemoptysis [Dyspnea] : no dyspnea [Wheezing] : no wheezing [Hypertension] : ~T hypertension [As Noted in HPI] : as noted in HPI [Myalgias] : myalgias [Arthralgias] : arthralgias [Negative] : Sleep Disorder

## 2019-03-14 NOTE — HISTORY OF PRESENT ILLNESS
[FreeTextEntry1] : 77 yo male with hx of OAD presents for follow up. The patient complains pf productive cough with shoulder pain for one week. He denies fever, chest pain or hemoptysis. He was treated with oral antibiotic by his PMD, and continues to use advair BID, singulair daily with PRN albuterol MDI.

## 2019-03-19 ENCOUNTER — EMERGENCY (EMERGENCY)
Facility: HOSPITAL | Age: 79
LOS: 1 days | Discharge: HOME | End: 2019-03-19
Attending: EMERGENCY MEDICINE | Admitting: EMERGENCY MEDICINE
Payer: MEDICARE

## 2019-03-19 VITALS
SYSTOLIC BLOOD PRESSURE: 165 MMHG | TEMPERATURE: 98 F | RESPIRATION RATE: 17 BRPM | DIASTOLIC BLOOD PRESSURE: 71 MMHG | HEART RATE: 71 BPM | OXYGEN SATURATION: 100 %

## 2019-03-19 VITALS
OXYGEN SATURATION: 100 % | SYSTOLIC BLOOD PRESSURE: 186 MMHG | RESPIRATION RATE: 18 BRPM | HEART RATE: 88 BPM | DIASTOLIC BLOOD PRESSURE: 98 MMHG | TEMPERATURE: 98 F

## 2019-03-19 LAB
ALBUMIN SERPL ELPH-MCNC: 4.2 G/DL — SIGNIFICANT CHANGE UP (ref 3.3–5)
ALP SERPL-CCNC: 66 U/L — SIGNIFICANT CHANGE UP (ref 40–120)
ALT FLD-CCNC: 20 U/L — SIGNIFICANT CHANGE UP (ref 4–41)
ANION GAP SERPL CALC-SCNC: 15 MMO/L — HIGH (ref 7–14)
AST SERPL-CCNC: 27 U/L — SIGNIFICANT CHANGE UP (ref 4–40)
BASOPHILS # BLD AUTO: 0.05 K/UL — SIGNIFICANT CHANGE UP (ref 0–0.2)
BASOPHILS NFR BLD AUTO: 0.6 % — SIGNIFICANT CHANGE UP (ref 0–2)
BILIRUB SERPL-MCNC: 0.4 MG/DL — SIGNIFICANT CHANGE UP (ref 0.2–1.2)
BUN SERPL-MCNC: 17 MG/DL — SIGNIFICANT CHANGE UP (ref 7–23)
CALCIUM SERPL-MCNC: 9.3 MG/DL — SIGNIFICANT CHANGE UP (ref 8.4–10.5)
CHLORIDE SERPL-SCNC: 101 MMOL/L — SIGNIFICANT CHANGE UP (ref 98–107)
CO2 SERPL-SCNC: 21 MMOL/L — LOW (ref 22–31)
CREAT SERPL-MCNC: 1.38 MG/DL — HIGH (ref 0.5–1.3)
EOSINOPHIL # BLD AUTO: 0.37 K/UL — SIGNIFICANT CHANGE UP (ref 0–0.5)
EOSINOPHIL NFR BLD AUTO: 4.2 % — SIGNIFICANT CHANGE UP (ref 0–6)
GLUCOSE SERPL-MCNC: 80 MG/DL — SIGNIFICANT CHANGE UP (ref 70–99)
HCT VFR BLD CALC: 41.4 % — SIGNIFICANT CHANGE UP (ref 39–50)
HGB BLD-MCNC: 12.9 G/DL — LOW (ref 13–17)
IMM GRANULOCYTES NFR BLD AUTO: 0.6 % — SIGNIFICANT CHANGE UP (ref 0–1.5)
LYMPHOCYTES # BLD AUTO: 1.21 K/UL — SIGNIFICANT CHANGE UP (ref 1–3.3)
LYMPHOCYTES # BLD AUTO: 13.7 % — SIGNIFICANT CHANGE UP (ref 13–44)
MCHC RBC-ENTMCNC: 28.2 PG — SIGNIFICANT CHANGE UP (ref 27–34)
MCHC RBC-ENTMCNC: 31.2 % — LOW (ref 32–36)
MCV RBC AUTO: 90.6 FL — SIGNIFICANT CHANGE UP (ref 80–100)
MONOCYTES # BLD AUTO: 0.88 K/UL — SIGNIFICANT CHANGE UP (ref 0–0.9)
MONOCYTES NFR BLD AUTO: 10 % — SIGNIFICANT CHANGE UP (ref 2–14)
NEUTROPHILS # BLD AUTO: 6.28 K/UL — SIGNIFICANT CHANGE UP (ref 1.8–7.4)
NEUTROPHILS NFR BLD AUTO: 70.9 % — SIGNIFICANT CHANGE UP (ref 43–77)
NRBC # FLD: 0 K/UL — LOW (ref 25–125)
NT-PROBNP SERPL-SCNC: 199.5 PG/ML — SIGNIFICANT CHANGE UP
PLATELET # BLD AUTO: 211 K/UL — SIGNIFICANT CHANGE UP (ref 150–400)
PMV BLD: 10.7 FL — SIGNIFICANT CHANGE UP (ref 7–13)
POTASSIUM SERPL-MCNC: 5 MMOL/L — SIGNIFICANT CHANGE UP (ref 3.5–5.3)
POTASSIUM SERPL-SCNC: 5 MMOL/L — SIGNIFICANT CHANGE UP (ref 3.5–5.3)
PROT SERPL-MCNC: 7.4 G/DL — SIGNIFICANT CHANGE UP (ref 6–8.3)
RBC # BLD: 4.57 M/UL — SIGNIFICANT CHANGE UP (ref 4.2–5.8)
RBC # FLD: 12.2 % — SIGNIFICANT CHANGE UP (ref 10.3–14.5)
SODIUM SERPL-SCNC: 137 MMOL/L — SIGNIFICANT CHANGE UP (ref 135–145)
TROPONIN T, HIGH SENSITIVITY: 12 NG/L — SIGNIFICANT CHANGE UP (ref ?–14)
TROPONIN T, HIGH SENSITIVITY: 12 NG/L — SIGNIFICANT CHANGE UP (ref ?–14)
TROPONIN T, HIGH SENSITIVITY: 17 NG/L — SIGNIFICANT CHANGE UP (ref ?–14)
WBC # BLD: 8.84 K/UL — SIGNIFICANT CHANGE UP (ref 3.8–10.5)
WBC # FLD AUTO: 8.84 K/UL — SIGNIFICANT CHANGE UP (ref 3.8–10.5)

## 2019-03-19 PROCEDURE — 99284 EMERGENCY DEPT VISIT MOD MDM: CPT | Mod: GC

## 2019-03-19 PROCEDURE — 71046 X-RAY EXAM CHEST 2 VIEWS: CPT | Mod: 26

## 2019-03-19 RX ORDER — IPRATROPIUM/ALBUTEROL SULFATE 18-103MCG
3 AEROSOL WITH ADAPTER (GRAM) INHALATION ONCE
Qty: 0 | Refills: 0 | Status: COMPLETED | OUTPATIENT
Start: 2019-03-19 | End: 2019-03-19

## 2019-03-19 RX ADMIN — Medication 60 MILLIGRAM(S): at 09:46

## 2019-03-19 RX ADMIN — Medication 3 MILLILITER(S): at 09:46

## 2019-03-19 NOTE — ED PROVIDER NOTE - CLINICAL SUMMARY MEDICAL DECISION MAKING FREE TEXT BOX
78y male presenting with cough and chest pain and SOB associated with cough.  Concern for asthma exacerbation, possibly due to pulmonary infection.  Considering cardiac cause but less likely based on story.  Will get ekg, labs, trop, bnp, cxr. Treat with nebs and prednisone.

## 2019-03-19 NOTE — ED ADULT TRIAGE NOTE - CHIEF COMPLAINT QUOTE
C/o pleuritic chest pain, sob, worsening productive cough, generalized body aches worse in lower back, and subjective fevers/chills x 1 day.  VS as noted.  ekg in progress.

## 2019-03-19 NOTE — ED PROVIDER NOTE - OBJECTIVE STATEMENT
78y male with PMH of asthma and HTN presenting with cough and shortness of breath.  Started 2 days ago cough is sometimes productive of white to yellow sputum, he also feels like he is wheezing, he gets the chest pain and SOB when he is coughing.  Saw his PMD 2 days ago and was given and advair inhaler and montelukast and was told it was an asthma exacerbation. He is using his albuterol inhaler 5-6 times a day.  Denies fever, chills, sore 78y male with PMH of asthma and HTN presenting with cough and shortness of breath.  Started 2 days ago cough is sometimes productive of white to yellow sputum, he also feels like he is wheezing, he gets the chest pain and SOB when he is coughing only.  Saw his PMD 2 days ago and was given and advair inhaler and montelukast and was told it was an asthma exacerbation. He is using his albuterol inhaler 5-6 times a day.  Denies fever, chills, sore

## 2019-03-19 NOTE — ED ADULT NURSE NOTE - NSIMPLEMENTINTERV_GEN_ALL_ED
Implemented All Universal Safety Interventions:  [place] to call system. Call bell, personal items and telephone within reach. Instruct patient to call for assistance. Room bathroom lighting operational. Non-slip footwear when patient is off stretcher. Physically safe environment: no spills, clutter or unnecessary equipment. Stretcher in lowest position, wheels locked, appropriate side rails in place.

## 2019-03-19 NOTE — ED PROVIDER NOTE - ATTENDING CONTRIBUTION TO CARE
79 yo M presents with worsening cough and sob over the past 3-4 days. he states that he has a chronic cough, but in the past 3-4 days it is more frequent and newly productive of yellow white sputum. no hemoptysis. no sob at rest, but reports sob when climbing stairs or walking for long periods of time. doesn't usually have sob with these activities. reports chest and back aching ONLY with coughing. no cp otherwise. no pleuritic chest pain. no leg pain or swelling. reports bl paraspinal low back pain with coughing, movement, bending over, and heavy lifting. has a h/o mild asthma, saw his pmd yesterday who gave him inhaler for his symptoms today. reports chills and subjective warmth. no measrued fevers.   no uri symptoms, sore throat, abd pain, N/V/D.   no known PE/DVT risk factors.   PE no resp distress. well appearing, comfortable. lungs with scattered mild wheezing bl. no leg swelling  patient given steroids and duonebs in the ER. 79 yo M h/o asthma presents with worsening cough and sob over the past 3-4 days. he states that he has a chronic cough, but in the past 3-4 days it is more frequent and newly productive of yellow white sputum. no hemoptysis. no sob at rest, but reports sob with coughing or walking for long periods of time.  reports chest and back aching ONLY with coughing. no cp otherwise. no pleuritic chest pain. no leg pain or swelling. reports bl paraspinal low back pain with coughing, movement, bending over, and heavy lifting. has a h/o mild asthma, saw his pmd yesterday who gave him inhaler for his symptoms today. reports chills and subjective warmth. no measrued fevers.   no uri symptoms, sore throat, abd pain, N/V/D.   no known PE/DVT risk factors.   PE no resp distress. well appearing, comfortable. lungs with scattered wheezing bl. no leg swelling  patient given steroids and duonebs in the ER.  on patietn re-eval lungs CTA. patient reporting FULL resolutino of symptoms. VS normal. I had patietn ambulate around the ER and he did so without any feelings of sob or cp. patient states that he feels back to normal and would like to be discahrged. patient with no known PE risk factors, no vs derrangements suggestive of pe, and is entirely asymptoamtic at this time, and therefore I do not belive that PE workup indicated at this time. Patient was discharged with instructions to take course of steroids, albuterol prn , and follow up with PMD in 1-2 days for repeat evaluation and further management.    I reviewed all results from this ED visit, and discussed ALL results with the patient and/or family, including all abnormal results and incidental findings. All questions/concerns were addressed, and I recommended appropriate follow up for all findings. All discharge instructions were thoroughly discussed with the patient and/or family, as well as important warning signs and new/ worsening symptoms which should necessitate patient's immediate return to the ED. The patient expressed understanding of all results discussed and follow up instructions given.The patient was agreeable with discharge and was discharged from the ED in stable condition.

## 2019-03-19 NOTE — ED PROVIDER NOTE - NSFOLLOWUPINSTRUCTIONS_ED_ALL_ED_FT
Please follow up with your regular doctor.    Seek immediate medical attention for any new or worsening symptoms.    Take 60mg prednisone daily for the next 4 days. Please follow up with your regular doctor.    Seek immediate medical attention for any new or worsening symptoms.    Take 50mg prednisone daily for the next 4 days.

## 2019-03-19 NOTE — ED ADULT NURSE NOTE - OBJECTIVE STATEMENT
Pt presents to ED complaining of cough x5 days with chest and back pain while coughing. Patient states he has a history of asthma and takes advair twice a day. Pt denies shortness of breath. Breathing even and unlabored. O2 saturation 100% on room air. Pt denies chest pain, lightheadedness, dizziness. Pt denies abd pain, n/v/d. Pt denies dysuria and hematuria. Skin clean dry and intact. 20G IVL placed in the R arm. Labs drawn and sent. MD at bedside evaluating patient. Will continue to monitor.

## 2019-03-29 ENCOUNTER — APPOINTMENT (OUTPATIENT)
Dept: DERMATOLOGY | Facility: CLINIC | Age: 79
End: 2019-03-29
Payer: MEDICARE

## 2019-03-29 VITALS — SYSTOLIC BLOOD PRESSURE: 150 MMHG | DIASTOLIC BLOOD PRESSURE: 70 MMHG

## 2019-03-29 PROCEDURE — 99213 OFFICE O/P EST LOW 20 MIN: CPT

## 2019-05-22 ENCOUNTER — EMERGENCY (EMERGENCY)
Facility: HOSPITAL | Age: 79
LOS: 1 days | Discharge: HOME | End: 2019-05-22
Admitting: EMERGENCY MEDICINE
Payer: MEDICARE

## 2019-05-22 VITALS
TEMPERATURE: 99 F | HEART RATE: 82 BPM | SYSTOLIC BLOOD PRESSURE: 154 MMHG | OXYGEN SATURATION: 100 % | RESPIRATION RATE: 18 BRPM | DIASTOLIC BLOOD PRESSURE: 85 MMHG

## 2019-05-22 LAB
ALBUMIN SERPL ELPH-MCNC: 4.1 G/DL — SIGNIFICANT CHANGE UP (ref 3.3–5)
ALP SERPL-CCNC: 69 U/L — SIGNIFICANT CHANGE UP (ref 40–120)
ALT FLD-CCNC: 15 U/L — SIGNIFICANT CHANGE UP (ref 4–41)
ANION GAP SERPL CALC-SCNC: 11 MMO/L — SIGNIFICANT CHANGE UP (ref 7–14)
APPEARANCE UR: CLEAR — SIGNIFICANT CHANGE UP
AST SERPL-CCNC: 20 U/L — SIGNIFICANT CHANGE UP (ref 4–40)
BASE EXCESS BLDV CALC-SCNC: 2.8 MMOL/L — SIGNIFICANT CHANGE UP
BASOPHILS # BLD AUTO: 0.08 K/UL — SIGNIFICANT CHANGE UP (ref 0–0.2)
BASOPHILS NFR BLD AUTO: 1 % — SIGNIFICANT CHANGE UP (ref 0–2)
BILIRUB SERPL-MCNC: 0.4 MG/DL — SIGNIFICANT CHANGE UP (ref 0.2–1.2)
BILIRUB UR-MCNC: NEGATIVE — SIGNIFICANT CHANGE UP
BLOOD GAS VENOUS - CREATININE: 1.44 MG/DL — HIGH (ref 0.5–1.3)
BLOOD GAS VENOUS - FIO2: 21 — SIGNIFICANT CHANGE UP
BLOOD UR QL VISUAL: NEGATIVE — SIGNIFICANT CHANGE UP
BUN SERPL-MCNC: 24 MG/DL — HIGH (ref 7–23)
CALCIUM SERPL-MCNC: 9.4 MG/DL — SIGNIFICANT CHANGE UP (ref 8.4–10.5)
CHLORIDE BLDV-SCNC: 109 MMOL/L — HIGH (ref 96–108)
CHLORIDE SERPL-SCNC: 104 MMOL/L — SIGNIFICANT CHANGE UP (ref 98–107)
CO2 SERPL-SCNC: 24 MMOL/L — SIGNIFICANT CHANGE UP (ref 22–31)
COLOR SPEC: SIGNIFICANT CHANGE UP
CREAT SERPL-MCNC: 1.46 MG/DL — HIGH (ref 0.5–1.3)
EOSINOPHIL # BLD AUTO: 0.39 K/UL — SIGNIFICANT CHANGE UP (ref 0–0.5)
EOSINOPHIL NFR BLD AUTO: 5.1 % — SIGNIFICANT CHANGE UP (ref 0–6)
GAS PNL BLDV: 138 MMOL/L — SIGNIFICANT CHANGE UP (ref 136–146)
GLUCOSE BLDV-MCNC: 106 MG/DL — HIGH (ref 70–99)
GLUCOSE SERPL-MCNC: 110 MG/DL — HIGH (ref 70–99)
GLUCOSE UR-MCNC: NEGATIVE — SIGNIFICANT CHANGE UP
HCO3 BLDV-SCNC: 24 MMOL/L — SIGNIFICANT CHANGE UP (ref 20–27)
HCT VFR BLD CALC: 40.6 % — SIGNIFICANT CHANGE UP (ref 39–50)
HCT VFR BLDV CALC: 40.2 % — SIGNIFICANT CHANGE UP (ref 39–51)
HGB BLD-MCNC: 12.9 G/DL — LOW (ref 13–17)
HGB BLDV-MCNC: 13.1 G/DL — SIGNIFICANT CHANGE UP (ref 13–17)
IMM GRANULOCYTES NFR BLD AUTO: 0.4 % — SIGNIFICANT CHANGE UP (ref 0–1.5)
KETONES UR-MCNC: NEGATIVE — SIGNIFICANT CHANGE UP
LACTATE BLDV-MCNC: 1.5 MMOL/L — SIGNIFICANT CHANGE UP (ref 0.5–2)
LEUKOCYTE ESTERASE UR-ACNC: NEGATIVE — SIGNIFICANT CHANGE UP
LYMPHOCYTES # BLD AUTO: 1.35 K/UL — SIGNIFICANT CHANGE UP (ref 1–3.3)
LYMPHOCYTES # BLD AUTO: 17.6 % — SIGNIFICANT CHANGE UP (ref 13–44)
MCHC RBC-ENTMCNC: 28.7 PG — SIGNIFICANT CHANGE UP (ref 27–34)
MCHC RBC-ENTMCNC: 31.8 % — LOW (ref 32–36)
MCV RBC AUTO: 90.2 FL — SIGNIFICANT CHANGE UP (ref 80–100)
MONOCYTES # BLD AUTO: 0.66 K/UL — SIGNIFICANT CHANGE UP (ref 0–0.9)
MONOCYTES NFR BLD AUTO: 8.6 % — SIGNIFICANT CHANGE UP (ref 2–14)
NEUTROPHILS # BLD AUTO: 5.18 K/UL — SIGNIFICANT CHANGE UP (ref 1.8–7.4)
NEUTROPHILS NFR BLD AUTO: 67.3 % — SIGNIFICANT CHANGE UP (ref 43–77)
NITRITE UR-MCNC: NEGATIVE — SIGNIFICANT CHANGE UP
NRBC # FLD: 0 K/UL — SIGNIFICANT CHANGE UP (ref 0–0)
PCO2 BLDV: 61 MMHG — HIGH (ref 41–51)
PH BLDV: 7.3 PH — LOW (ref 7.32–7.43)
PH UR: 6.5 — SIGNIFICANT CHANGE UP (ref 5–8)
PLATELET # BLD AUTO: 233 K/UL — SIGNIFICANT CHANGE UP (ref 150–400)
PMV BLD: 10.2 FL — SIGNIFICANT CHANGE UP (ref 7–13)
PO2 BLDV: < 24 MMHG — LOW (ref 35–40)
POTASSIUM BLDV-SCNC: 4.6 MMOL/L — HIGH (ref 3.4–4.5)
POTASSIUM SERPL-MCNC: 4.9 MMOL/L — SIGNIFICANT CHANGE UP (ref 3.5–5.3)
POTASSIUM SERPL-SCNC: 4.9 MMOL/L — SIGNIFICANT CHANGE UP (ref 3.5–5.3)
PROT SERPL-MCNC: 7 G/DL — SIGNIFICANT CHANGE UP (ref 6–8.3)
PROT UR-MCNC: 50 — SIGNIFICANT CHANGE UP
RBC # BLD: 4.5 M/UL — SIGNIFICANT CHANGE UP (ref 4.2–5.8)
RBC # FLD: 12.6 % — SIGNIFICANT CHANGE UP (ref 10.3–14.5)
SAO2 % BLDV: 25.1 % — LOW (ref 60–85)
SODIUM SERPL-SCNC: 139 MMOL/L — SIGNIFICANT CHANGE UP (ref 135–145)
SP GR SPEC: 1.02 — SIGNIFICANT CHANGE UP (ref 1–1.04)
UROBILINOGEN FLD QL: NORMAL — SIGNIFICANT CHANGE UP
WBC # BLD: 7.69 K/UL — SIGNIFICANT CHANGE UP (ref 3.8–10.5)
WBC # FLD AUTO: 7.69 K/UL — SIGNIFICANT CHANGE UP (ref 3.8–10.5)

## 2019-05-22 PROCEDURE — 99283 EMERGENCY DEPT VISIT LOW MDM: CPT

## 2019-05-22 NOTE — ED PROVIDER NOTE - PROGRESS NOTE DETAILS
Patient feeling well. Vitals stable. Labs WNL. Pt labs stable compared to prior. UA negative. No leukocytosis. Pt ambulatory in ED and remains neuro intact. Urine Culture sent off. Pt advised to follow up with urologist and PCP. Pt understood and agreed with plan. Admits he was seening a urologist Dr. Monique. Sx likely 2/2 BPH. Pt understood and agreed to f/u. All question and concerns addressed. Strict return instructions given. EBEN WHIPLPE MD: This patient was never directly seen by me.  I never performed a face to face HPI, ROS, PMHX or PE.  I was not involved in the MDM or the final disposition of the patient.  I was not present for any procedures performed on the patient.  I was available for consultation during at least a portion of the patients stay in the Emergency Department.  I am co-signing the note of the physician assistant as per hospital protocol.

## 2019-05-22 NOTE — ED PROVIDER NOTE - PLAN OF CARE
Patient advised to follow up with PRIMARY CARE DOCTOR IN 1-2 DAYS AND UROLOGIST WITHIN WEEK  and told to return to the emergency department immediately for any new or concerning symptoms such as worsening urinary frequency, back pain, numbness or tingling, weakness, fevers, chills, blood in urine, abdominal pain OR ANY OTHER COMPLAINTS. Patient agrees with plan.    Continue home medications

## 2019-05-22 NOTE — ED PROVIDER NOTE - CARE PLAN
Principal Discharge DX:	Urinary frequency  Assessment and plan of treatment:	Patient advised to follow up with PRIMARY CARE DOCTOR IN 1-2 DAYS AND UROLOGIST WITHIN WEEK  and told to return to the emergency department immediately for any new or concerning symptoms such as worsening urinary frequency, back pain, numbness or tingling, weakness, fevers, chills, blood in urine, abdominal pain OR ANY OTHER COMPLAINTS. Patient agrees with plan.    Continue home medications

## 2019-05-22 NOTE — ED PROVIDER NOTE - OBJECTIVE STATEMENT
HPI: Patient is a 79 y.o male with PMHx of asthma, HTN (on ace-inhibitor), BPH, prior CVA, CKD who presents to ED c/o Urinary frequency x 1 week. As per patient states that he has been having increased urinary frequency, worse at night having to go to bathroom multiple times. Pt also noted some b/l low back pain that is also intermittent, not currently having back pain. Reviewing chart patient has been admitted for UTI/POLO 1 year ago, was seen by nephrology team but patient did not follow up. Pt admits recently had cardiac work up outpatient that was normal. Pt denies dysuria, hematuria, saddle anesthesia, incontinence, diarrhea, constipation, melena, abdominal pain, n/v, testicular pain or swelling, neck pain, fevers, chills, trauma/falls, difficulty urinating or any other complaints.

## 2019-05-22 NOTE — ED PROVIDER NOTE - NSFOLLOWUPINSTRUCTIONS_ED_ALL_ED_FT
Patient advised to follow up with PRIMARY CARE DOCTOR IN 1-2 DAYS AND UROLOGIST WITHIN WEEK  and told to return to the emergency department immediately for any new or concerning symptoms such as worsening urinary frequency, back pain, numbness or tingling, weakness, fevers, chills, blood in urine, abdominal pain OR ANY OTHER COMPLAINTS. Patient agrees with plan.    Continue home medications   Follow up with urology   urology clinic (call [phone] to make an appointment)

## 2019-05-22 NOTE — ED ADULT TRIAGE NOTE - CHIEF COMPLAINT QUOTE
Pt complaining of lower back pain x2 weeks and increase urination. Pt states he needs to urinate every hour, more frequently at night. Pt denies chest pain, SOB, N/V/D, fever or chills.

## 2019-05-22 NOTE — ED PROVIDER NOTE - CLINICAL SUMMARY MEDICAL DECISION MAKING FREE TEXT BOX
Patient is a 79 y.o male with PMHx of asthma, HTN (on ace-inhibitor), BPH, prior CVA, CKD who presents to ED c/o Urinary frequency x 1 week. DDx- includes but not limited to; UTI, r/o POLO. Pt not currently having back pain. Plan- Labs, UA/UC, will monitor and reassess. Pt remains neurologically intact, no pedal edema or signs of fluid overload. Will monitor closely and reassess.

## 2019-05-22 NOTE — ED PROVIDER NOTE - PHYSICAL EXAMINATION
Vital signs reviewed.   CONSTITUTIONAL: Well-appearing; well-nourished; in no apparent distress. Non-toxic appearing.   HEAD: Normocephalic, atraumatic.  EYES: PERRL, EOM intact, conjunctiva and sclera WNL.  ENT: normal nose; no rhinorrhea;   NECK: No midline tenderness noted.   CARD: Normal S1, S2; no murmurs, rubs, or gallops noted.  RESP: Normal chest excursion with respiration; breath sounds clear and equal bilaterally; no wheezes, rhonchi, or rales.  ABD/GI: soft, non-distended; non-tender; no palpable organomegaly, no pulsatile mass. NCVAT b/l.   EXT/MS: moves all extremities; distal pulses are normal, no pedal edema. No midline tenderness noted. 5/5 strength UE/LE b/l. No crepitus or obvious deformities noted.   SKIN: Normal for age and race; warm; dry; good turgor; no apparent lesions or exudate noted. No erythema, fluctuance, signs of infection noted.   NEURO: Awake, alert, oriented x 3, no gross deficits, no motor or sensory deficit noted. 5/5 strength UE/LE b/l.   PSYCH: Normal mood; appropriate affect.

## 2019-05-23 LAB — SPECIMEN SOURCE: SIGNIFICANT CHANGE UP

## 2019-05-24 LAB — BACTERIA UR CULT: SIGNIFICANT CHANGE UP

## 2019-06-12 ENCOUNTER — APPOINTMENT (OUTPATIENT)
Dept: UROLOGY | Facility: CLINIC | Age: 79
End: 2019-06-12

## 2019-06-28 ENCOUNTER — APPOINTMENT (OUTPATIENT)
Dept: DERMATOLOGY | Facility: CLINIC | Age: 79
End: 2019-06-28
Payer: MEDICARE

## 2019-06-28 PROCEDURE — 99213 OFFICE O/P EST LOW 20 MIN: CPT

## 2019-07-01 ENCOUNTER — RX RENEWAL (OUTPATIENT)
Age: 79
End: 2019-07-01

## 2019-07-18 ENCOUNTER — APPOINTMENT (OUTPATIENT)
Dept: UROLOGY | Facility: CLINIC | Age: 79
End: 2019-07-18
Payer: MEDICARE

## 2019-07-18 VITALS
HEIGHT: 72 IN | DIASTOLIC BLOOD PRESSURE: 66 MMHG | BODY MASS INDEX: 29.26 KG/M2 | WEIGHT: 216 LBS | SYSTOLIC BLOOD PRESSURE: 155 MMHG | TEMPERATURE: 97.9 F | HEART RATE: 64 BPM

## 2019-07-18 PROCEDURE — 99204 OFFICE O/P NEW MOD 45 MIN: CPT

## 2019-07-20 LAB
ANION GAP SERPL CALC-SCNC: 14 MMOL/L
BUN SERPL-MCNC: 21 MG/DL
CALCIUM SERPL-MCNC: 9.6 MG/DL
CHLORIDE SERPL-SCNC: 105 MMOL/L
CO2 SERPL-SCNC: 23 MMOL/L
CREAT SERPL-MCNC: 1.39 MG/DL
GLUCOSE SERPL-MCNC: 95 MG/DL
POTASSIUM SERPL-SCNC: 5 MMOL/L
PSA FREE FLD-MCNC: 18 %
PSA FREE SERPL-MCNC: 0.36 NG/ML
PSA SERPL-MCNC: 2.01 NG/ML
SODIUM SERPL-SCNC: 142 MMOL/L

## 2019-07-26 NOTE — LETTER BODY
[FreeTextEntry1] : Dr. Lance Andres MD\par [address] [facility]\[place] [address]\par [phone]\par \par Reason for Visit: BPH. Balanitis.\par \par This is a 79 year-old gentleman with symptoms of BPH. Patient is here today for evaluation. He does report penile discomfort. Patient reports he has weak uroflow, frequency, and hesitancy. He denies any hematuria or urinary incontinence. His symptoms are aggravated by hydration. He denies any alleviating factors. He has not tried any medical therapy previously. He reports no pain. All other review of systems are negative. He has no cancer in his family medical history. He has no previous surgical history. Past medical history, family history and social history were inquired and were noncontributory to current condition. The patient does not use tobacco or drink alcohol. Medications and allergies were reviewed. He has no known allergies to medication. \par \par On examination, the patient is a healthy-appearing gentleman in no acute distress. He is alert and oriented follows commands. He has normal mood and affect. He is normocephalic. Neck is supple. Oral no thrush Respirations are unlabored. His abdomen is soft and nontender. Bladder is nonpalpable. No CVA tenderness. Neurologically he is grossly intact. No peripheral edema. Balanitis is present.  He has normal male external genitalia. Normal meatus. Bilateral testes are descended intrascrotally and normal to palpation. On rectal examination, there is normal sphincter tone. The prostate is clinically benign without focal induration or nodularity. He has mild balanitis. \par \par I personally reviewed the ultrasound scan with patient today. Ultrasound demonstrated no evidence of renal mass or calculi. \par \par ASSESSMENT: BPH, Balanitis\par \par I counseled the patient on the various etiology of his symptoms. I discussed the natural history of BPH and the treatment options available. I discussed the options of conservative management with fluid in dietary restrictions, herbal therapy, medical therapy, and minimally invasive procedures.  Risk and benefits were discussed. I answered his questions. I recommended he try Flomax. I discussed the potential side effects of the medication. I counseled the patient on its use and side effects. If the patient develops any side effects, the patient will discontinue the medication and contact me. In terms of balanitis, I recommend he apply clotrimazole - betamethasone ointment twice a day.  Risks and alternatives were discussed. I answered the patient questions. The patient will follow-up as directed and will contact me with any questions or concerns. Thank you for the opportunity to participate in the care of Mr. PARNELL. I will keep you updated on his progress.\par \par Plan: Clotrimazole- betamethasone. Flomax. Follow up in 1 month.

## 2019-07-26 NOTE — ADDENDUM
[FreeTextEntry1] : Entered by Allison Carrillo, acting as scribe for Dr. Nnamdi Carrillo.\par \par The documentation recorded by the scribe accurately reflects the service I personally performed and the decisions made by me.

## 2019-08-15 ENCOUNTER — APPOINTMENT (OUTPATIENT)
Dept: UROLOGY | Facility: CLINIC | Age: 79
End: 2019-08-15
Payer: MEDICARE

## 2019-08-15 PROCEDURE — 99214 OFFICE O/P EST MOD 30 MIN: CPT

## 2019-08-15 NOTE — LETTER BODY
[FreeTextEntry1] : Dr. Lance Andres MD\par [address] [facility]\[place] [address]\par [phone]\par \par Reason for Visit: BPH. Balanitis.\par \par This is a 79 year-old gentleman with symptoms of BPH. Patient is here today for evaluation. He does report penile discomfort. Patient reports improved uroflow, frequency, and hesitancy. He denies any hematuria or urinary incontinence. He denies any alleviating factors. He has not tried any medical therapy previously. He reports no pain. All other review of systems are negative. He has no cancer in his family medical history. He has no previous surgical history. Past medical history, family history and social history were inquired and were noncontributory to current condition. The patient does not use tobacco or drink alcohol. Medications and allergies were reviewed. He has no known allergies to medication. \par \par On examination, the patient is a healthy-appearing gentleman in no acute distress. He is alert and oriented follows commands. He has normal mood and affect. He is normocephalic. Neck is supple. Oral no thrush Respirations are unlabored. His abdomen is soft and nontender. Bladder is nonpalpable. No CVA tenderness. Neurologically he is grossly intact. No peripheral edema. Balanitis is present. He has normal male external genitalia. Normal meatus. Bilateral testes are descended intrascrotally and normal to palpation. On rectal examination, there is normal sphincter tone. The prostate is clinically benign without focal induration or nodularity. He has mild balanitis. \par \par I personally reviewed the ultrasound scan with patient upon last visit. Ultrasound demonstrated no evidence of renal mass or calculi. \par \par ASSESSMENT: BPH, Balanitis\par \par I counseled the patient on the various etiology of his symptoms. I discussed the natural history of BPH and the treatment options available. I discussed the options of conservative management with fluid in dietary restrictions, herbal therapy, medical therapy, and minimally invasive procedures. Risk and benefits were discussed. I answered his questions. I recommended he try Flomax. I discussed the potential side effects of the medication. I counseled the patient on its use and side effects. If the patient develops any side effects, the patient will discontinue the medication and contact me. In terms of balanitis, I recommend he apply clotrimazole - betamethasone ointment twice a day. Risks and alternatives were discussed. I answered the patient questions. The patient will follow-up as directed and will contact me with any questions or concerns. Thank you for the opportunity to participate in the care of Mr. PARNELL. I will keep you updated on his progress.\par \par Plan: US kidney. Flomax. Follow up as directed

## 2019-08-22 ENCOUNTER — APPOINTMENT (OUTPATIENT)
Dept: UROLOGY | Facility: CLINIC | Age: 79
End: 2019-08-22
Payer: MEDICARE

## 2019-08-22 ENCOUNTER — OUTPATIENT (OUTPATIENT)
Dept: OUTPATIENT SERVICES | Facility: HOSPITAL | Age: 79
LOS: 1 days | End: 2019-08-22
Payer: MEDICARE

## 2019-08-22 DIAGNOSIS — R35.0 FREQUENCY OF MICTURITION: ICD-10-CM

## 2019-08-22 PROCEDURE — 99213 OFFICE O/P EST LOW 20 MIN: CPT | Mod: 25

## 2019-08-22 PROCEDURE — 76775 US EXAM ABDO BACK WALL LIM: CPT | Mod: 26

## 2019-08-22 PROCEDURE — 76775 US EXAM ABDO BACK WALL LIM: CPT

## 2019-08-22 NOTE — ADDENDUM
[FreeTextEntry1] : Entered by Fred Madrid, acting as scribe for Dr. Nnamdi Carrillo.\par \par The documentation recorded by the scribe accurately reflects the service I personally performed and the decisions made by me.

## 2019-08-22 NOTE — LETTER BODY
[FreeTextEntry1] : Dr. Lance Andres MD\par [address] [facility]\par [place] [id number]\par [phone]\par \par Reason for Visit: BPH. Balanitis.\par \par This is a 79 year-old gentleman with symptoms of BPH. Patient is here today for follow-up renal ultrasound. He does report penile discomfort. Patient reports improved uroflow, frequency, and hesitancy. He denies any hematuria or urinary incontinence. He denies any alleviating factors. He has not tried any medical therapy previously. He reports no pain. All other review of systems are negative. He has no cancer in his family medical history. He has no previous surgical history. Past medical history, family history and social history were unchanged. Medications and allergies were reviewed. He has no known allergies to medication. \par \par On examination, the patient is a healthy-appearing gentleman in no acute distress. He is alert and oriented follows commands. He has normal mood and affect. He is normocephalic. Neck is supple. Oral no thrush Respirations are unlabored. His abdomen is soft and nontender. Bladder is nonpalpable. No CVA tenderness. Neurologically he is grossly intact. No peripheral edema. Balanitis is present. His phimosis is improving. He has normal male external genitalia. Normal meatus. Bilateral testes are descended intrascrotally and normal to palpation. On rectal examination, there is normal sphincter tone. The prostate is clinically benign without focal induration or nodularity. He has mild balanitis. \par \par I personally reviewed the ultrasound scan with patient upon last visit. Again visualized bilateral simple cysts. Ultrasound demonstrated no evidence of renal mass or calculi. \par \par ASSESSMENT: BPH, Balanitis\par \par I counseled the patient on the various etiology of his symptoms. I discussed the natural history of BPH and the treatment options available. I discussed the options of conservative management with fluid in dietary restrictions, herbal therapy, medical therapy, and minimally invasive procedures. Risk and benefits were discussed. I answered his questions. I recommended he continue Flomax. I discussed the potential side effects of the medication. I counseled the patient on its use and side effects. If the patient develops any side effects, the patient will discontinue the medication and contact me. In terms of balanitis, I recommend he apply clotrimazole - betamethasone ointment twice a day. Risks and alternatives were discussed. I answered the patient questions. The patient will follow-up as directed and will contact me with any questions or concerns. Thank you for the opportunity to participate in the care of Mr. PARNELL. I will keep you updated on his progress.\par \par Plan: Continue Flomax and clotrimazole - betamethasone BID. Follow up in 6 months.\par

## 2019-08-26 DIAGNOSIS — N48.0 LEUKOPLAKIA OF PENIS: ICD-10-CM

## 2019-08-26 DIAGNOSIS — N28.9 DISORDER OF KIDNEY AND URETER, UNSPECIFIED: ICD-10-CM

## 2019-09-12 ENCOUNTER — APPOINTMENT (OUTPATIENT)
Dept: PULMONOLOGY | Facility: CLINIC | Age: 79
End: 2019-09-12
Payer: MEDICARE

## 2019-09-12 VITALS
RESPIRATION RATE: 16 BRPM | TEMPERATURE: 97.7 F | DIASTOLIC BLOOD PRESSURE: 88 MMHG | HEART RATE: 64 BPM | WEIGHT: 206 LBS | BODY MASS INDEX: 27.9 KG/M2 | SYSTOLIC BLOOD PRESSURE: 170 MMHG | HEIGHT: 72 IN | OXYGEN SATURATION: 96 %

## 2019-09-12 DIAGNOSIS — R05 COUGH: ICD-10-CM

## 2019-09-12 PROCEDURE — 99214 OFFICE O/P EST MOD 30 MIN: CPT

## 2019-09-12 NOTE — DISCUSSION/SUMMARY
[FreeTextEntry1] : 80 yo male with stable OAD. He is to continue treatment as before with adjustment based on symptomatic needs. He is to follow up with his PMD and for the influenza vaccine.

## 2019-09-12 NOTE — PHYSICAL EXAM
[General Appearance - Well Developed] : well developed [Normal Appearance] : normal appearance [Well Groomed] : well groomed [General Appearance - Well Nourished] : well nourished [No Deformities] : no deformities [General Appearance - In No Acute Distress] : no acute distress [Normal Conjunctiva] : the conjunctiva exhibited no abnormalities [Eyelids - No Xanthelasma] : the eyelids demonstrated no xanthelasmas [Normal Oropharynx] : normal oropharynx [Jugular Venous Distention Increased] : there was no jugular-venous distention [Neck Appearance] : the appearance of the neck was normal [Neck Cervical Mass (___cm)] : no neck mass was observed [Thyroid Nodule] : there were no palpable thyroid nodules [Thyroid Diffuse Enlargement] : the thyroid was not enlarged [Heart Sounds] : normal S1 and S2 [Heart Rate And Rhythm] : heart rate and rhythm were normal [Murmurs] : no murmurs present [Respiration, Rhythm And Depth] : normal respiratory rhythm and effort [Exaggerated Use Of Accessory Muscles For Inspiration] : no accessory muscle use [Abdomen Soft] : soft [Scattered Wheezes] : scattered wheezing [Abdomen Tenderness] : non-tender [Abdomen Mass (___ Cm)] : no abdominal mass palpated [Petechial Hemorrhages (___cm)] : no petechial hemorrhages [Cyanosis, Localized] : no localized cyanosis [FreeTextEntry1] : Mild digital clubbing [Skin Color & Pigmentation] : normal skin color and pigmentation [No Venous Stasis] : no venous stasis [] : no rash [Skin Lesions] : no skin lesions [No Xanthoma] : no  xanthoma was observed [No Skin Ulcers] : no skin ulcer [No Focal Deficits] : no focal deficits [Impaired Insight] : insight and judgment were intact [Oriented To Time, Place, And Person] : oriented to person, place, and time [Affect] : the affect was normal

## 2019-10-15 ENCOUNTER — APPOINTMENT (OUTPATIENT)
Dept: DERMATOLOGY | Facility: CLINIC | Age: 79
End: 2019-10-15
Payer: MEDICARE

## 2019-10-15 PROCEDURE — 99214 OFFICE O/P EST MOD 30 MIN: CPT

## 2019-10-15 NOTE — PHYSICAL EXAM
[Alert] : alert [Oriented x 3] : ~L oriented x 3 [Well Nourished] : well nourished [Conjunctiva Non-injected] : conjunctiva non-injected [No Visual Lymphadenopathy] : no visual  lymphadenopathy [No Clubbing] : no clubbing [No Edema] : no edema [No Bromhidrosis] : no bromhidrosis [No Chromhidrosis] : no chromhidrosis [FreeTextEntry3] : \par hyperpigmented patches lateral hands\par back clear of eczematous plaques\par hyperpigmented patches on feet\par

## 2019-10-15 NOTE — HISTORY OF PRESENT ILLNESS
[FreeTextEntry1] : f/u eczema  [de-identified] : 79yo M here for f/u eczema x years, significantly improved previously most prominent on hands, lower ext and back.  Dupixent initiated in Oct 2018.  Mild pruritus on the hands and feet. Recently ran out of topicals.  Pt using Vaseline as moisturizer. No issues with self-injections. Tolerating dupixent well without ocular or oral issues. \par \par \par Initial history: 77 year old M w/ PMH of asthma here for evaluation of an itchy rash scattered over the whole body, but most concentrated on his lower legs and dorsal feet. Present for 1-2 mo. He has never had any skin rashes prior to this. He was initially given hydrocortisone cream by his PCP, which did not help. He went to the [facility] ED on 1/18 and was given prednisone 50mg BID. The itch is most severe at night, and sometimes prevents him from sleeping. He has not tried antihistamines. He uses [place] soap. He does not moisturize. No new medications. No systemic sx. Had his kidney function checked by his PCP, which was wnl, per pt.

## 2019-10-29 ENCOUNTER — EMERGENCY (EMERGENCY)
Facility: HOSPITAL | Age: 79
LOS: 1 days | Discharge: HOME | End: 2019-10-29
Attending: EMERGENCY MEDICINE | Admitting: EMERGENCY MEDICINE
Payer: MEDICARE

## 2019-10-29 VITALS
SYSTOLIC BLOOD PRESSURE: 142 MMHG | DIASTOLIC BLOOD PRESSURE: 79 MMHG | RESPIRATION RATE: 16 BRPM | HEART RATE: 80 BPM | TEMPERATURE: 98 F | OXYGEN SATURATION: 95 %

## 2019-10-29 LAB
APPEARANCE UR: CLEAR — SIGNIFICANT CHANGE UP
BACTERIA # UR AUTO: NEGATIVE — SIGNIFICANT CHANGE UP
BILIRUB UR-MCNC: NEGATIVE — SIGNIFICANT CHANGE UP
BLOOD UR QL VISUAL: NEGATIVE — SIGNIFICANT CHANGE UP
COLOR SPEC: YELLOW — SIGNIFICANT CHANGE UP
GLUCOSE UR-MCNC: NEGATIVE — SIGNIFICANT CHANGE UP
HYALINE CASTS # UR AUTO: NEGATIVE — SIGNIFICANT CHANGE UP
KETONES UR-MCNC: NEGATIVE — SIGNIFICANT CHANGE UP
LEUKOCYTE ESTERASE UR-ACNC: NEGATIVE — SIGNIFICANT CHANGE UP
NITRITE UR-MCNC: NEGATIVE — SIGNIFICANT CHANGE UP
PH UR: 6.5 — SIGNIFICANT CHANGE UP (ref 5–8)
PROT UR-MCNC: 20 — SIGNIFICANT CHANGE UP
RBC CASTS # UR COMP ASSIST: SIGNIFICANT CHANGE UP (ref 0–?)
SP GR SPEC: 1.02 — SIGNIFICANT CHANGE UP (ref 1–1.04)
SQUAMOUS # UR AUTO: SIGNIFICANT CHANGE UP
UROBILINOGEN FLD QL: NORMAL — SIGNIFICANT CHANGE UP
WBC UR QL: SIGNIFICANT CHANGE UP (ref 0–?)

## 2019-10-29 PROCEDURE — 99283 EMERGENCY DEPT VISIT LOW MDM: CPT | Mod: GC

## 2019-10-29 RX ORDER — LIDOCAINE 4 G/100G
1 CREAM TOPICAL ONCE
Refills: 0 | Status: DISCONTINUED | OUTPATIENT
Start: 2019-10-29 | End: 2019-10-29

## 2019-10-29 RX ORDER — IBUPROFEN 200 MG
1 TABLET ORAL
Qty: 40 | Refills: 0
Start: 2019-10-29 | End: 2019-11-07

## 2019-10-29 RX ORDER — ACETAMINOPHEN 500 MG
650 TABLET ORAL ONCE
Refills: 0 | Status: COMPLETED | OUTPATIENT
Start: 2019-10-29 | End: 2019-10-29

## 2019-10-29 RX ORDER — IBUPROFEN 200 MG
600 TABLET ORAL ONCE
Refills: 0 | Status: COMPLETED | OUTPATIENT
Start: 2019-10-29 | End: 2019-10-29

## 2019-10-29 RX ADMIN — Medication 600 MILLIGRAM(S): at 09:30

## 2019-10-29 RX ADMIN — Medication 650 MILLIGRAM(S): at 09:29

## 2019-10-29 NOTE — ED PROVIDER NOTE - ATTENDING CONTRIBUTION TO CARE
79 year old male PMH HTN, asthma, BPD, p/w back pain. Patient states gradual onset approximately one month ago. Patient saw his PCP and was sent to urologist, Dr. GLENYS Carrillo, given Flomax and Cipro for UTI which he is still taking. Patient states pain has not gotten better w/ medications. Takes Tylenol intermittently w/ relief. Patient denies recent trauma, no IVDU, no hx HIV/AIDS. No lower extremity weakness, urine/bowel incontinence or retention, or saddle anesthesia.  On exam: VSS lungs, heart, pulses, abdomen, neuro, extremities, skin, all normal on exam. No significant spinal tenderness or CVAT. IMP: Low back pain with no signs of neuro or other emergency red flags. Plan: Pain relief, check UA, Lumbar spine Xray. Refer to Spine center

## 2019-10-29 NOTE — ED PROVIDER NOTE - OBJECTIVE STATEMENT
79 year old male PMH HTN, asthma, BPD, p/w back pain. Patient states gradual onset approximately one month ago. Patient saw his PCP and was sent to urologist, Dr. GLENYS Carrillo, given Flomax and Cipro for UTI which he is still taking. Patient states pain has not gotten better w/ medications. Takes Tylenol intermittently w/ relief. Patient denies recent trauma, no IVDU, no hx HIV/AIDS. No lower extremity weakness, urine/bowel incontinence or retention, or saddle anesthesia. Patient also endorses left ear tinnitus, given unknown cream to apply in ear. Denies fever, chills, headache, visual changes, chest pain, shortness of breath, abdominal pain, or numbness/tingling.

## 2019-10-29 NOTE — ED PROVIDER NOTE - NSFOLLOWUPINSTRUCTIONS_ED_ALL_ED_FT
1. Please follow up with your primary care doctor in 48-72 hours.  2. Continue taking all medications as prescribed.   3. Take Tylenol 650mg (Two 325 mg pills) every 4-6 hours as needed for pain. Take Motrin 600 mg every 8 hours as needed for moderate pain -- take with food.   4. You may follow up with the spine center if your symptoms persist. Their contact information is attached to your discharge paperwork.   5. Return to this Emergency Department for worsening condition, difficulties urinating, weakness in legs, or any other new or concerning symptoms. 1. Please follow up with your primary care doctor in 48-72 hours.  2. Continue taking all medications as prescribed.   3. Take Tylenol 650mg (Two 325 mg pills) every 4-6 hours as needed for pain. Take Motrin 600 mg every 6 hours as needed for moderate pain -- take with food.   4. You may follow up with the spine center if your symptoms persist. Their contact information is attached to your discharge paperwork.   5. Return to this Emergency Department for worsening condition, difficulties urinating, weakness in legs, or any other new or concerning symptoms.

## 2019-10-29 NOTE — ED PROVIDER NOTE - PATIENT PORTAL LINK FT
You can access the FollowMyHealth Patient Portal offered by [facility] by registering at the following website: [email or web address]. By joining [employer]’s FollowMyHealth portal, you will also be able to view your health information using other applications (apps) compatible with our system.

## 2019-10-29 NOTE — ED PROVIDER NOTE - NS ED MD EM SELECTION
Continue Axert as directed for migraines.   Use Flexeril as directed as needed for neck spasm.   Apply heating pad to neck. Protect skin.   Follow up with PCP with any increase in symptoms or concerns.   Return to urgent care or emergency department with any increase in symptoms or concerns.    [id number] Detailed

## 2019-10-29 NOTE — ED PROVIDER NOTE - CLINICAL SUMMARY MEDICAL DECISION MAKING FREE TEXT BOX
79 year old male w/ back pain x1 month, currently being treated for UTI and BPH w/o relief of pain. Intermittent relief w/ Tylenol. No red flag sx or neuro deficits. Plan for pain management, UA, d/c w/ spine follow up. For tinnitus, no s/s of infection, can give PCP vs ENT follow up.

## 2019-10-29 NOTE — ED ADULT TRIAGE NOTE - CHIEF COMPLAINT QUOTE
lower back pain since two weeks ago . pt says he was seen here for the same a month ago and then he followed up with a neurologist but the pain is getting worse now. Pt is ambulatory. Also c/o ringing in his ears.

## 2019-10-29 NOTE — ED ADULT NURSE REASSESSMENT NOTE - NS ED NURSE REASSESS COMMENT FT1
Intake pt ambulatory to ER c/o lower mid back pain x few days, denies trauma, no upper or lower ext. numbness, no weakness, + equal strength.    medicated for pain, pending dispo.   Angie Campbell RN

## 2019-10-29 NOTE — ED PROVIDER NOTE - NS ED ROS FT
GENERAL: no fever, no chills  EYES: no change in vision  HEENT: no trouble swallowing or speaking, +tinnitus  CARDIAC: no chest pain, no palpitations  PULMONARY: no cough, no shortness of breath, no wheezing  GI: no abdominal pain, no nausea, no vomiting, no diarrhea, no constipation  : no changes in urination, no dysuria, no frequency  SKIN: no rashes  NEURO: no headache, no numbness, no weakness  MSK: no joint pain, no muscle pain, +back pain, no calf pain     -Caesar Noriega, PGY-1

## 2019-10-29 NOTE — ED PROVIDER NOTE - PROGRESS NOTE DETAILS
DH: Patient states pain improved. Feels comfortable going home. Discussed UA results, informed to continue Cipro until completion. Patient given ortho spine referral. All questions answered. CAOx3, NAD, VSS. Stable for d/c.

## 2019-10-30 LAB
BACTERIA UR CULT: SIGNIFICANT CHANGE UP
SPECIMEN SOURCE: SIGNIFICANT CHANGE UP

## 2019-11-25 ENCOUNTER — EMERGENCY (EMERGENCY)
Facility: HOSPITAL | Age: 79
LOS: 1 days | Discharge: HOME | End: 2019-11-25
Attending: STUDENT IN AN ORGANIZED HEALTH CARE EDUCATION/TRAINING PROGRAM | Admitting: STUDENT IN AN ORGANIZED HEALTH CARE EDUCATION/TRAINING PROGRAM
Payer: MEDICARE

## 2019-11-25 VITALS
DIASTOLIC BLOOD PRESSURE: 93 MMHG | HEART RATE: 83 BPM | SYSTOLIC BLOOD PRESSURE: 154 MMHG | RESPIRATION RATE: 20 BRPM | TEMPERATURE: 98 F | OXYGEN SATURATION: 98 %

## 2019-11-25 PROCEDURE — 71046 X-RAY EXAM CHEST 2 VIEWS: CPT | Mod: 26

## 2019-11-25 PROCEDURE — 99283 EMERGENCY DEPT VISIT LOW MDM: CPT | Mod: GC

## 2019-11-25 RX ORDER — LORATADINE 10 MG/1
1 TABLET ORAL
Qty: 14 | Refills: 0
Start: 2019-11-25 | End: 2019-12-08

## 2019-11-25 RX ORDER — FLUTICASONE PROPIONATE 50 MCG
1 SPRAY, SUSPENSION NASAL
Qty: 1 | Refills: 0
Start: 2019-11-25 | End: 2019-12-08

## 2019-11-25 NOTE — ED ADULT NURSE NOTE - OBJECTIVE STATEMENT
pt received in rm 22 AAO x 3. pt reports tinnitus and nasal congestion x 2 weeks. pt also reports sob at times, non productive cough and chest discomfort when coughing. pt states he had fever at night couple days ago. pt denies chest pain, n/v/d, recent sick contacts. respirations even and unlabored.

## 2019-11-25 NOTE — ED PROVIDER NOTE - OBJECTIVE STATEMENT
79 year old male with PMH HTN, BPH, asthma presents with nasal congestion x 1 month. Pt also reports bilateral ear pressure, left greater than right associated with tinnitus and decreased hearing.  Pt states he saw his PCP 3 weeks ago and was advised to use hydrogen peroxide ear drops and use nasal spray (unsure of name).  Pt reports using nasal spray with improvement, but states he ran out approximately 2 weeks ago.  Pt reports using hydrogen peroxide ear drops with little improvement.  Pt admits to subjective fever 1 week ago, but denies ear pain, facial pain, sore throat, odynophagia, wheezing, shortness of breath, abdominal pain, nausea, vomiting, diarrhea, or rash.  Tolerating PO intake. Denies using any over the counter medications.  Denies any additional complaints.

## 2019-11-25 NOTE — ED PROVIDER NOTE - NS ED ROS FT
Review of Systems    Constitutional: (-) fever, (-) chills, (-) fatigue  HEENT: (-) sore throat, (-) ear pain, (+) nasal congestion  Cardiovascular: (-) chest pain, (-) syncope  Respiratory: (+) cough, (-) shortness of breath  Gastrointestinal: (-) vomiting, (-) diarrhea, (-) abdominal pain  Musculoskeletal: (-) neck pain, (-) back pain, (-) joint pain  Integumentary: (-) rash, (-) edema, (-) wound  Neurological: (-) headache, (-) altered mental status    Except as documented in the HPI, all other systems are negative.

## 2019-11-25 NOTE — ED PROVIDER NOTE - PATIENT PORTAL LINK FT
You can access the FollowMyHealth Patient Portal offered by [facility] by registering at the following website: [email or web address]. By joining [employer]’s FollowMyHealth portal, you will also be able to view your health information using other applications (apps) compatible with our system.

## 2019-11-25 NOTE — ED PROVIDER NOTE - CLINICAL SUMMARY MEDICAL DECISION MAKING FREE TEXT BOX
Zach-PGY1: 79 year old male with PMH HTN, BPH, asthma presents with nasal congestion x 1 month. Pt history of allergies, states his symptoms are similar to past episodes, reports using nasal spray with improvement, but states he ran out.  No fever, shortness of breath, facial pain, or nausea/vomiting.  No TM erythema bilaterally. Plan includes CXR r/o pneumonia and reassessment with likely DC with PCP follow up.

## 2019-11-25 NOTE — ED PROVIDER NOTE - NSFOLLOWUPINSTRUCTIONS_ED_ALL_ED_FT
Rest and drink plenty of fluids.    Use fluticasone (Flonase) nasal spray and cetirizine (Zyrtec) daily as discussed.    Use albuterol inhaler as needed for wheezing or shortness of breath.    Follow up with your primary care physician regarding left lung nodule.    Return immediately with any new or worsening symptoms, including fever, severe headache, vision change, hearing loss, shortness of breath, chest pain, lightheadedness, rash, numbness, weakness, or any additional concerns. Rest and drink plenty of fluids.    Use fluticasone (Flonase) nasal spray and cetirizine (Zyrtec) daily as discussed.    Use albuterol inhaler as needed for wheezing or shortness of breath.    Follow up with your primary care physician regarding left lung nodule.    If symptoms persist, follow up with ENT specialist (list provided).    Return immediately with any new or worsening symptoms, including fever, severe headache, vision change, hearing loss, shortness of breath, chest pain, lightheadedness, rash, numbness, weakness, or any additional concerns.

## 2019-11-25 NOTE — ED PROVIDER NOTE - ATTENDING CONTRIBUTION TO CARE
80yo M h/o htn, asthma p/w 3-4 nasal congestion, cough, and decreased hearing in left ear. was seen by his pcp and started on nasal sprays and hydrogen peroxide, completed nasal spray and ran out but still with symptoms. had subjective fever 3 days ago, but since resolved. no sob. no facial pain or headaches. pt states he was told he needs to see ENT, but came here first  on exam  Gen: Well appearing in NAD  Head: NC/AT  Neck: trachea midline  Resp:  No distress, lungs clear  CV: rrr  ENT: TM visualized, no obv effusion or otitis, ears nontender  Ext: no deformities  Neuro:  A&O appears non focal  Skin:  Warm and dry as visualized  Psych:  Normal affect and mood  80yo M with nasal congestion, cough and decreased hearing. lungs clear, VS normal,.  will get cxr given cough, nasal spray, decongestants and ENT follow up for decreased hearing on left side

## 2019-11-25 NOTE — ED PROVIDER NOTE - PHYSICAL EXAMINATION
VITAL SIGNS: I have reviewed nursing notes and confirm.  CONSTITUTIONAL: non-toxic, well appearing  SKIN: no rash, no petechiae.  EYES: PERRL, EOMI, pink conjunctiva, anicteric  ENT: tongue and uvular midline, no exudates, moist mucous membranes.  White peroxide residue visualized in external canals bilaterally, no TM erythema bilaterally  NECK: Supple; no meningismus, no JVD, no LAD  CARD: RRR, no murmurs, equal radial pulses bilaterally 2+  RESP: CTAB, no respiratory distress  ABD: Soft, non-tender, non-distended, no peritoneal signs, no CVA tenderness  EXT: Normal ROM x4. No edema. No calf tenderness  NEURO: Alert, oriented. Neuro exam nonfocal, equal strength bilaterally  PSYCH: Cooperative, appropriate.

## 2019-11-25 NOTE — ED PROVIDER NOTE - PROGRESS NOTE DETAILS
Zach-PGY1: discussed lung nodule findings on CXR with patient, seen on prior imaging. Advised patient of importance of PCP follow up regarding finding, pt understood and agreeable to plan.

## 2019-11-25 NOTE — ED PROVIDER NOTE - CHIEF COMPLAINT
1/16/2019 Dimension 3, 4, 5 and 6  Group Chart Note - Co-facilitated with FERMIN Davis.  Number of clients attending the group:  5      Joselyn Ibarra attended 1.5 hour DBT group covering the following topics Distress tolerance.  Client was Actively participating, Attentive and Engaged.  Client's response:  Client shared about having a lot of willfulness in regards to acceptance of her sister at times. Client stated she wishes her sister would change, but she won't at this time.Client identified how being upset with her sister caused her more anger and it did not feel good to be angry and not in control of her emotions. Client took a leadership role in the group activity.      The patient is a 79y Male complaining of congestion.

## 2019-12-03 RX ORDER — CLOTRIMAZOLE AND BETAMETHASONE DIPROPIONATE 10; .5 MG/G; MG/G
1-0.05 CREAM TOPICAL TWICE DAILY
Qty: 1 | Refills: 0 | Status: ACTIVE | COMMUNITY
Start: 2019-07-18 | End: 1900-01-01

## 2020-01-13 ENCOUNTER — EMERGENCY (EMERGENCY)
Facility: HOSPITAL | Age: 80
LOS: 1 days | Discharge: HOME | End: 2020-01-13
Attending: STUDENT IN AN ORGANIZED HEALTH CARE EDUCATION/TRAINING PROGRAM | Admitting: STUDENT IN AN ORGANIZED HEALTH CARE EDUCATION/TRAINING PROGRAM
Payer: MEDICARE

## 2020-01-13 VITALS
OXYGEN SATURATION: 100 % | DIASTOLIC BLOOD PRESSURE: 73 MMHG | TEMPERATURE: 98 F | HEART RATE: 74 BPM | RESPIRATION RATE: 16 BRPM | SYSTOLIC BLOOD PRESSURE: 169 MMHG

## 2020-01-13 LAB
APPEARANCE UR: CLEAR — SIGNIFICANT CHANGE UP
BACTERIA # UR AUTO: NEGATIVE — SIGNIFICANT CHANGE UP
BILIRUB UR-MCNC: NEGATIVE — SIGNIFICANT CHANGE UP
BLOOD UR QL VISUAL: NEGATIVE — SIGNIFICANT CHANGE UP
COLOR SPEC: SIGNIFICANT CHANGE UP
GLUCOSE UR-MCNC: NEGATIVE — SIGNIFICANT CHANGE UP
HYALINE CASTS # UR AUTO: NEGATIVE — SIGNIFICANT CHANGE UP
KETONES UR-MCNC: NEGATIVE — SIGNIFICANT CHANGE UP
LEUKOCYTE ESTERASE UR-ACNC: NEGATIVE — SIGNIFICANT CHANGE UP
NITRITE UR-MCNC: NEGATIVE — SIGNIFICANT CHANGE UP
PH UR: 6.5 — SIGNIFICANT CHANGE UP (ref 5–8)
PROT UR-MCNC: 30 — SIGNIFICANT CHANGE UP
RBC CASTS # UR COMP ASSIST: HIGH (ref 0–?)
SP GR SPEC: 1.02 — SIGNIFICANT CHANGE UP (ref 1–1.04)
SQUAMOUS # UR AUTO: SIGNIFICANT CHANGE UP
UROBILINOGEN FLD QL: NORMAL — SIGNIFICANT CHANGE UP
WBC UR QL: SIGNIFICANT CHANGE UP (ref 0–?)

## 2020-01-13 PROCEDURE — 99283 EMERGENCY DEPT VISIT LOW MDM: CPT | Mod: GC

## 2020-01-13 NOTE — ED PROVIDER NOTE - PHYSICAL EXAMINATION
VITALS: reviewed  GEN: NAD, A & O x 4  HEAD/EYES: NCAT, PERRL, EOMI, anicteric sclerae, no conjunctival pallor  ENT: mucus membranes moist, oropharynx WNL, trachea midline, no JVD  RESP: lungs CTA with equal breath sounds bilaterally, chest wall nontender and atraumatic  CV: heart with reg rhythm S1, S2, distal pulses intact and symmetric bilaterally  ABDOMEN: normoactive bowel sounds, soft, nondistended, nontender, no palpable masses  : no CVAT  MSK: extremities atraumatic and nontender, no edema, no asymmetry.   SKIN: warm, dry, no rash, no bruising, no cyanosis. color appropriate for ethnicity  NEURO: alert, mentating appropriately, no facial asymmetry.  PSYCH: Affect appropriate

## 2020-01-13 NOTE — ED PROVIDER NOTE - PROGRESS NOTE DETAILS
Jenny Steinberg M.D: urine dip demonstrating proteinuria. no signns/symptoms of infection. stable for dc at this time with pcp follow up.

## 2020-01-13 NOTE — ED PROVIDER NOTE - CLINICAL SUMMARY MEDICAL DECISION MAKING FREE TEXT BOX
78 yo M presenting for blood pressure check. /73, no cp, well appearing on exam. Likely dc with pcp fu

## 2020-01-13 NOTE — ED ADULT TRIAGE NOTE - CHIEF COMPLAINT QUOTE
Pt. c/o elevated bp x 1 weeks while on vacation in [place]. Pt. stated he also noted his urine is more yellow than usual. denies any pain or difficulty urinating.

## 2020-01-13 NOTE — ED ADULT NURSE NOTE - NS ED NURSE LEVEL OF CONSCIOUSNESS ORIENTATION
Nutrition Services Education Note    Physician: Dr. Rodriguez  Diagnosis: Multiple myeloma  Treatment:  Velcade    Food and Nutrition Related History:  Spoke with patient over the phone today per [employer] consult. Noted patient has met with transplant RD and diabetes RD in the past. Patient was previously on the kidney transplant list but is currently off of it due to myeloma. Patient is receiving dialysis and states he sees an RD there as well. He reports he still eats 2-3 meals per day. Sometimes he has a lunch meal. He has eggs with toast or grits for breakfast. He has sandwiches in between home and going to dialysis. For dinner he has chicken, vegetables, tacos. He does not eat dairy due to the phosphorous. He states he has been instructed on following a high protein diet. He watches his potassium as well. He states he is on a fluid restriction of 32 ounces per day. He states he follows a low sodium diet as well.       Nutrition Diagnosis:    Food-and-nutrition-related knowledge deficit: related to nutrition during cancer treatment, diet for renal failure, and diabetes as evidenced by patient reports a basic-moderate level of understanding and questions asked today.    Intervention:    Purpose of the nutrition education: Provided nutrition education focused on a plant-based diet high in a variety of fruits, vegetables, whole grains, and beans/legumes. Reviewed sources of whole grains, the benefits of consuming omega-3 fatty acids, and lean protein sources. Reviewed the role of protein during treatment and encouraged a high protein diet. Encouraged him to continue to follow dietary recommendations for renal failure and diabetes management. Encouraged him to call if questions arise.       Monitoring and Evaluation:   Area(s) and level of knowledge: Patient verbalized a moderate level of understanding and willingness to make modifications.  Contact information provided for further questions/concerns.     Oriented - self; Oriented - place; Oriented - time

## 2020-01-13 NOTE — ED PROVIDER NOTE - OBJECTIVE STATEMENT
78 yo M hx HTN,BPH, asthma presenting for medical evaluation with complaints of elevated blood pressures. Patient with recent trip to [place] and noted that his BP's were higher than usual (usually 's, as high as 190's in [place]), no change in meds. Denies chest pain, shortness of breath, headache, nausea/vomiting or blurry vision. Endorses urine is more "yellow" than usual, without pain or difficulty urinating.

## 2020-01-13 NOTE — ED PROVIDER NOTE - ATTENDING CONTRIBUTION TO CARE
Jenny Steinberg M.D: 79M hx htn on enalapril, bph, presents for medical evaluatio. notes since returning from [place] on vacation 2 weeks ago has been having elevated BPs with highest 190s/100s. has been compliant with medication. no cp no sob no headache no blurry vision but does note urine seems darker but no dysuria no hematuria no fc. pt without complaint at this time. exam agree with as documented by resident, but in no distress lungs ctab heart rrr. explained to pt that asymptomatic htn is not an emergency and pt should follow with his pmd for fruther care. no cp so no indication for ekg, no headache no indication for head imaging. no symptoms to necessitate labs. given pts perseverating on urine willl check udip and dc.

## 2020-01-13 NOTE — ED ADULT NURSE NOTE - OBJECTIVE STATEMENT
80 y/o male presents to ED with c/o elevated BP.  Pt states that he was in [place] and noted that his BP readings were higher than normal.  Pt denies change in medications states that he is compliant with currently prescribed medications.  Pt states that he has an appointment with his PMD in 3 days but wanted to be checked out.  Pt offers no c/o headache, no dizziness, no n/v/d, denies abd pain CP or SOB.  Pt also states that he feels as though his urine is "more yellow". Pt denies dysuria, no frequency or urgency.  Urine specimen obtained, clear yellow urine, specimen sent to lab and POC testing performed.

## 2020-01-13 NOTE — ED PROVIDER NOTE - PATIENT PORTAL LINK FT
You can access the FollowMyHealth Patient Portal offered by [facility] by registering at the following website: [email or web address]. By joining [employer]’s FollowMyHealth portal, you will also be able to view your health information using other applications (apps) compatible with our system.

## 2020-01-14 ENCOUNTER — APPOINTMENT (OUTPATIENT)
Dept: DERMATOLOGY | Facility: CLINIC | Age: 80
End: 2020-01-14

## 2020-02-21 ENCOUNTER — EMERGENCY (EMERGENCY)
Facility: HOSPITAL | Age: 80
LOS: 1 days | Discharge: HOME | End: 2020-02-21
Attending: EMERGENCY MEDICINE | Admitting: EMERGENCY MEDICINE
Payer: MEDICARE

## 2020-02-21 VITALS
DIASTOLIC BLOOD PRESSURE: 67 MMHG | RESPIRATION RATE: 16 BRPM | SYSTOLIC BLOOD PRESSURE: 137 MMHG | HEART RATE: 62 BPM | TEMPERATURE: 97 F | OXYGEN SATURATION: 99 %

## 2020-02-21 LAB
ANION GAP SERPL CALC-SCNC: 11 MMO/L — SIGNIFICANT CHANGE UP (ref 7–14)
APTT BLD: 26.8 SEC — LOW (ref 27.5–36.3)
BUN SERPL-MCNC: 16 MG/DL — SIGNIFICANT CHANGE UP (ref 7–23)
CALCIUM SERPL-MCNC: 9.6 MG/DL — SIGNIFICANT CHANGE UP (ref 8.4–10.5)
CHLORIDE SERPL-SCNC: 104 MMOL/L — SIGNIFICANT CHANGE UP (ref 98–107)
CO2 SERPL-SCNC: 24 MMOL/L — SIGNIFICANT CHANGE UP (ref 22–31)
CREAT SERPL-MCNC: 1.4 MG/DL — HIGH (ref 0.5–1.3)
GLUCOSE SERPL-MCNC: 103 MG/DL — HIGH (ref 70–99)
HCT VFR BLD CALC: 42.2 % — SIGNIFICANT CHANGE UP (ref 39–50)
HGB BLD-MCNC: 13.4 G/DL — SIGNIFICANT CHANGE UP (ref 13–17)
INR BLD: 1.1 — SIGNIFICANT CHANGE UP (ref 0.88–1.17)
MAGNESIUM SERPL-MCNC: 1.9 MG/DL — SIGNIFICANT CHANGE UP (ref 1.6–2.6)
MCHC RBC-ENTMCNC: 29.1 PG — SIGNIFICANT CHANGE UP (ref 27–34)
MCHC RBC-ENTMCNC: 31.8 % — LOW (ref 32–36)
MCV RBC AUTO: 91.5 FL — SIGNIFICANT CHANGE UP (ref 80–100)
NRBC # FLD: 0 K/UL — SIGNIFICANT CHANGE UP (ref 0–0)
PHOSPHATE SERPL-MCNC: 3.8 MG/DL — SIGNIFICANT CHANGE UP (ref 2.5–4.5)
PLATELET # BLD AUTO: 207 K/UL — SIGNIFICANT CHANGE UP (ref 150–400)
PMV BLD: 10.7 FL — SIGNIFICANT CHANGE UP (ref 7–13)
POTASSIUM SERPL-MCNC: 5.3 MMOL/L — SIGNIFICANT CHANGE UP (ref 3.5–5.3)
POTASSIUM SERPL-SCNC: 5.3 MMOL/L — SIGNIFICANT CHANGE UP (ref 3.5–5.3)
PROTHROM AB SERPL-ACNC: 12.2 SEC — SIGNIFICANT CHANGE UP (ref 9.8–13.1)
RBC # BLD: 4.61 M/UL — SIGNIFICANT CHANGE UP (ref 4.2–5.8)
RBC # FLD: 12.5 % — SIGNIFICANT CHANGE UP (ref 10.3–14.5)
SODIUM SERPL-SCNC: 139 MMOL/L — SIGNIFICANT CHANGE UP (ref 135–145)
WBC # BLD: 7.11 K/UL — SIGNIFICANT CHANGE UP (ref 3.8–10.5)
WBC # FLD AUTO: 7.11 K/UL — SIGNIFICANT CHANGE UP (ref 3.8–10.5)

## 2020-02-21 PROCEDURE — 99283 EMERGENCY DEPT VISIT LOW MDM: CPT | Mod: GC

## 2020-02-21 NOTE — ED ADULT NURSE NOTE - OBJECTIVE STATEMENT
Pt rec'd in rm 17, sent by PMD for elevated potassium on routine labs. Pt denies any complaints, states he went in for his regular checkup. Denies chest pain.

## 2020-02-21 NOTE — ED PROVIDER NOTE - PATIENT PORTAL LINK FT
You can access the FollowMyHealth Patient Portal offered by [facility] by registering at the following website: [email or web address]. By joining [employer]’s FollowMyHealth portal, you will also be able to view your health information using other applications (apps) compatible with our system.

## 2020-02-21 NOTE — ED PROVIDER NOTE - ATTENDING CONTRIBUTION TO CARE
I have personally performed a face to face bedside history and physical examination of this patient. I have discussed the history, examination, review of systems, assessment and plan of management with the resident. I have reviewed the electronic medical record and amended it to reflect my history, review of systems, physical exam, assessment and plan.    79 yoM, PMHx of HTN, asthma, BPH sent to ED after elevated K found on outpatient labs from this past week. Pt denies any sx. No syncope, CP, palpitations, urinary changes or pain. No issues with elevated K in the past. Baseline Scr 1.3. No new meds.  VSS AF.  Exam unremarkable.  EKG non-ischemic, no morphology c/w hyperkalemia.  Possible hemolysis on outpatient labs.  Will resend.  Patient currently stable.

## 2020-02-21 NOTE — ED ADULT TRIAGE NOTE - CHIEF COMPLAINT QUOTE
Pt sent in by MD for elevated potassium level. Pt states he went for a "regular check-up." Hx: HTN. Denies N/V, dizziness, chest pain, palpitations. Pt unsure how elevated

## 2020-02-21 NOTE — ED PROVIDER NOTE - NSFOLLOWUPINSTRUCTIONS_ED_ALL_ED_FT
Your potasium was normal today in the ER.    Continue to follow with your doctor.    Take your medications as prescribed.     Please return to ER for new or concerning symptoms.

## 2020-02-21 NOTE — ED PROVIDER NOTE - CLINICAL SUMMARY MEDICAL DECISION MAKING FREE TEXT BOX
Haverty PGy2- elevated K in outpatient lab setting, no sx, baseline Scr 1.3, feels well  normal exam  will recheck and have pt f/u with PCP if not actionable

## 2020-02-21 NOTE — ED PROVIDER NOTE - PROGRESS NOTE DETAILS
Jose PGY2- K normal here on labs, Scr at baseline, attempt made to call PCP by Dr. SINGH Quispe, stable for d/c and continued outpatient f/u mgmt of BP. JUWAN:  Attempted to call PCP Dr. Fulton with number provided by patient ([phone]) but no answer or voice messenger service.   K 5.3.  Will d/c.

## 2020-02-21 NOTE — ED PROVIDER NOTE - PHYSICAL EXAMINATION
PHYSICAL EXAM:  GENERAL: NAD, well-groomed, well-developed  HEAD:  Atraumatic, Normocephalic  EYES: EOMI, PERRLA, conjunctiva and sclera clear  ENMT: No tonsillar erythema, exudates, or enlargement; Moist mucous membranes  NECK: Supple, No JVD  HEART: Regular rate and rhythm; No murmurs, rubs, or gallops  RESPIRATORY: CTA B/L, No W/R/R  ABDOMEN: Soft, Nontender, Nondistended  BACK: no cva or midline ttp, normal ROM  NEURO: A&Ox3, nonfocal, moving all extremities, normal gait/memory/speech, PERRL  EXTREMITIES:  2+ Peripheral Pulses, No clubbing, cyanosis, or edema  SKIN: warm, dry, normal color, no rash

## 2020-02-21 NOTE — ED PROVIDER NOTE - OBJECTIVE STATEMENT
79 yoM, PMHx of HTN, asthma, BPH sent to ED after elevated K found on outpatient labs from this past week. Pt denies any sx. No syncope, CP, palpitations, urinary changes or pain. No issues with elevated K in the past. Baseline Scr 1.3. No new meds. Feels at baseline. Does not smoke. Social etoh. No drug use.

## 2020-02-24 ENCOUNTER — APPOINTMENT (OUTPATIENT)
Dept: UROLOGY | Facility: CLINIC | Age: 80
End: 2020-02-24

## 2020-03-12 ENCOUNTER — APPOINTMENT (OUTPATIENT)
Dept: PULMONOLOGY | Facility: CLINIC | Age: 80
End: 2020-03-12

## 2020-03-16 ENCOUNTER — APPOINTMENT (OUTPATIENT)
Dept: DERMATOLOGY | Facility: CLINIC | Age: 80
End: 2020-03-16
Payer: MEDICARE

## 2020-05-05 NOTE — ED ADULT NURSE NOTE - SUICIDE SCREENING DEPRESSION
Is This A New Presentation, Or A Follow-Up?: Phototherapy Treatment
When Was Your Last Phototherapy Treatment?: 05/01/2020
Negative

## 2020-06-09 ENCOUNTER — APPOINTMENT (OUTPATIENT)
Dept: DERMATOLOGY | Facility: CLINIC | Age: 80
End: 2020-06-09
Payer: MEDICARE

## 2020-06-09 PROCEDURE — 99214 OFFICE O/P EST MOD 30 MIN: CPT

## 2020-06-25 ENCOUNTER — APPOINTMENT (OUTPATIENT)
Dept: UROLOGY | Facility: CLINIC | Age: 80
End: 2020-06-25
Payer: MEDICARE

## 2020-06-25 VITALS — SYSTOLIC BLOOD PRESSURE: 174 MMHG | DIASTOLIC BLOOD PRESSURE: 78 MMHG | HEART RATE: 60 BPM

## 2020-06-25 VITALS — TEMPERATURE: 98.1 F

## 2020-06-25 PROCEDURE — 99214 OFFICE O/P EST MOD 30 MIN: CPT

## 2020-07-01 NOTE — LETTER BODY
[FreeTextEntry1] : Dr. Lance Andres MD\par [address] [facility]\[place] [address]\par [phone]\par \par Reason for Visit: BPH. Balanitis.\par \par This is a 80 year-old gentleman with symptoms of BPH and balanitis. Patient underwent an unremarkable renal ultrasound in Aug 2019. Patient is here today for follow-up. Since he was last seen, patient continues to take Flomax once a day without any side effects or difficulties. His urinary symptoms are managed while on medical therapy. He continues to apply clotrimazole-Betamethasone cream for his balanitis. He reports no pain. All other review of systems are negative. Past medical history, family history and social history were unchanged. Medications and allergies were reviewed. He has no known allergies to medication. \par \par On examination, the patient is an older-appearing gentleman in no acute distress. He is alert and oriented follows commands. He has normal mood and affect. He is normocephalic. Neck is supple. Oral no thrush Respirations are unlabored. His abdomen is soft and nontender. Bladder is nonpalpable. No CVA tenderness. Neurologically he is grossly intact. No peripheral edema. Balanitis is present. His phimosis is improving. He has normal male external genitalia. Normal meatus. Bilateral testes are descended intrascrotally and normal to palpation. On rectal examination, there is normal sphincter tone. The prostate is clinically benign without focal induration or nodularity. He has mild balanitis. \par \par Post-void residual on bladder scan today was 39 cc. \par \par ASSESSMENT: BPH. Balanitis\par \par I counseled the patient. In terms of his BPH, his symptoms are stable on medical therapy. I renewed the patient's prescription for Flomax today. I encouraged the patient to continue medications regularly as directed. In terms of balanitis, I encouraged him to continue to apply clotrimazole-Betamethasone cream BID. Risks and alternatives were discussed. I answered the patient questions. The patient will follow-up as directed and will contact me with any questions or concerns. Thank you for the opportunity to participate in the care of Mr. PARNELL. I will keep you updated on his progress.\par \par Plan: Continue Flomax and clotrimazole - betamethasone BID. Follow up in 1 year.

## 2020-07-14 ENCOUNTER — APPOINTMENT (OUTPATIENT)
Dept: DERMATOLOGY | Facility: CLINIC | Age: 80
End: 2020-07-14
Payer: MEDICARE

## 2020-07-14 VITALS — TEMPERATURE: 96.8 F

## 2020-07-14 PROCEDURE — 99213 OFFICE O/P EST LOW 20 MIN: CPT

## 2020-07-15 RX ORDER — DUPILUMAB 300 MG/2ML
300 INJECTION, SOLUTION SUBCUTANEOUS
Qty: 2 | Refills: 2 | Status: DISCONTINUED | COMMUNITY
Start: 2018-10-22 | End: 2020-07-15

## 2020-07-22 NOTE — ED ADULT NURSE NOTE - CHPI ED NUR SYMPTOMS NEG
none no chills/no decreased eating/drinking/no fever/no nausea/no pain/no tingling/no vomiting/no weakness/no dizziness

## 2020-07-27 ENCOUNTER — EMERGENCY (EMERGENCY)
Facility: HOSPITAL | Age: 80
LOS: 1 days | Discharge: HOME | End: 2020-07-27
Attending: EMERGENCY MEDICINE | Admitting: EMERGENCY MEDICINE
Payer: MEDICARE

## 2020-07-27 VITALS
SYSTOLIC BLOOD PRESSURE: 163 MMHG | TEMPERATURE: 97 F | OXYGEN SATURATION: 97 % | HEART RATE: 74 BPM | DIASTOLIC BLOOD PRESSURE: 75 MMHG | RESPIRATION RATE: 18 BRPM

## 2020-07-27 PROCEDURE — 99283 EMERGENCY DEPT VISIT LOW MDM: CPT | Mod: 25

## 2020-07-27 PROCEDURE — 73140 X-RAY EXAM OF FINGER(S): CPT | Mod: 26,LT

## 2020-07-27 PROCEDURE — 29130 APPL FINGER SPLINT STATIC: CPT | Mod: F2

## 2020-07-27 RX ORDER — ACETAMINOPHEN 500 MG
650 TABLET ORAL ONCE
Refills: 0 | Status: COMPLETED | OUTPATIENT
Start: 2020-07-27 | End: 2020-07-27

## 2020-07-27 RX ADMIN — Medication 650 MILLIGRAM(S): at 10:52

## 2020-07-27 NOTE — ED ADULT NURSE REASSESSMENT NOTE - NS ED NURSE REASSESS COMMENT FT1
Pt resting in results waiting, Alert and oriented x3. c.o 8/10 finger pain, denies need for medication at this time. Pt waiting for reassessment

## 2020-07-27 NOTE — ED PROVIDER NOTE - CLINICAL SUMMARY MEDICAL DECISION MAKING FREE TEXT BOX
79 y/o male with left middle finger pain after blunt trauma.  Swollen with ulnar dev at pip, lmtd rom d/t pain, distally nv intact.  Sprain vs fx vs dislocation vs subluxation.  XR give pain med reassess.

## 2020-07-27 NOTE — ED PROCEDURE NOTE - CPROC ED POST PROC CARE GUIDE1
Keep the cast/splint/dressing clean and dry./Verbal/written post procedure instructions were given to patient/caregiver. Elevate the injured extremity as instructed./Instructed patient/caregiver to follow-up with primary care physician./Verbal/written post procedure instructions were given to patient/caregiver./Keep the cast/splint/dressing clean and dry.

## 2020-07-27 NOTE — ED PROVIDER NOTE - NSFOLLOWUPINSTRUCTIONS_ED_ALL_ED_FT
You were seen for finger sprain.    For pain or fever you can ibuprofen (motrin, advil) or tylenol as needed, as directed on packaging.     Follow up with Plastic Surgery within 3-5 days - we have provided you a list with available providers to make an appointment with.     If you had labs or imaging done, you were given copies of all of the available results. If anything is pending to result or pending official read, you will receive a call if results are positive.    If needed, call patient access services at [phone] to find a primary care doctor, or call at [phone] to make an appointment at the clinic.    Return to the ER for any worsening symptoms or concerns, including loss of finger sensation, worsening swelling, chest pain, shortness of breath, fever, or chills. You were seen for finger sprain and were treated with a splint. Please keep the splint clean and dry and follow up with your PCP/Plastic surgery within 3-5 days - we have provided you a list with available providers to make an appointment with.     For pain or fever you can ibuprofen (motrin, advil) or tylenol as needed, as directed on packaging.     If you had labs or imaging done, you were given copies of all of the available results. If anything is pending to result or pending official read, you will receive a call if results are positive.    If needed, call patient access services at [phone] to find a primary care doctor, or call at [phone] to make an appointment at the clinic.    Return to the ER for any worsening symptoms or concerns, including loss of finger sensation, worsening swelling, chest pain, shortness of breath, fever, or chills.

## 2020-07-27 NOTE — ED ADULT TRIAGE NOTE - CHIEF COMPLAINT QUOTE
C/o pain to middle finger on left hand after injuring it last night while moving furniture. Finger appears swollen at this time. Pt able to move his fingers.

## 2020-07-27 NOTE — ED PROVIDER NOTE - PATIENT PORTAL LINK FT
You can access the FollowMyHealth Patient Portal offered by [facility] by registering at the following website: [email or web address]. By joining [employer]’s FollowMyHealth portal, you will also be able to view your health information using other applications (apps) compatible with our system.

## 2020-07-27 NOTE — ED PROVIDER NOTE - PROGRESS NOTE DETAILS
Aluminum finger splint with tanisha tape in place. Pt was instructed to follow up with Hand Plastic surgery within 3-5 days and to keep the splint dry, and to return to the ED for loss of finger sensation, worsening/concerning pain/symptoms, or new symptoms such as chest pain, shortness of breath, fever, or chills.  Pt verbalizes understanding of above return precautions and follow-up plan.

## 2020-07-27 NOTE — ED PROVIDER NOTE - PHYSICAL EXAMINATION
Gen: WDWN, NAD  HEENT: EOMI, no nasal discharge, mucous membranes moist  CV: RRR, +S1/S2, no M/R/G  Resp: CTAB, no W/R/R  GI: Abdomen soft non-distended, NTTP, no masses  MSK: There is swelling of the L middle digit PIP joint. Cap refill <2 secs and sensation intact. Limited flexion of the PIP/DIP jt. No open wounds.   Neuro: A&Ox4, following commands, moving all four extremities spontaneously  Psych: appropriate mood, denies AH, VH, SI Gen: WDWN, NAD  HEENT: EOMI, no nasal discharge, mucous membranes moist  CV: RRR, +S1/S2, no M/R/G  Resp: CTAB, no W/R/R  GI: Abdomen soft non-distended, NTTP, no masses  MSK: There is swelling and ulnar deviation of the L middle digit PIP joint. Cap refill <2 secs and sensation intact. Limited flexion of the PIP/DIP jt. No open wounds.   Neuro: A&Ox4, following commands, moving all four extremities spontaneously  Psych: appropriate mood, denies AH, VH, SI

## 2020-07-27 NOTE — ED PROCEDURE NOTE - ATTENDING CONTRIBUTION TO CARE
MD FUENTES:  I was present for the critical portions of this procedure and provided direct attending supervision.

## 2020-07-27 NOTE — ED PROVIDER NOTE - OBJECTIVE STATEMENT
81 yo M PMHx HTN and asthma s/p trauma to L 3rd digit c/o finger pain. Pt was moving furniture last night when he got his left middle finger caught between a sofa and the wall. There's been significant swelling since and pain is currently rated 9/10. Pt declines any numbness or tingling distal to the injury. Denies any n/v, f/c.

## 2020-07-27 NOTE — ED PROVIDER NOTE - NS ED ROS FT
Gen: Denies fever  CV: Denies chest pain, palpitations  Skin: Denies rash, erythema, color changes  Resp: Denies SOB, cough  GI: Denies diarrhea, constipation, nausea, vomiting  Msk: +finger pain, Denies back pain, lower extremity swelling, extremity pain  : Denies dysuria, increased frequency  Neuro: Denies LOC, weakness, seizures

## 2020-09-21 ENCOUNTER — APPOINTMENT (OUTPATIENT)
Dept: DERMATOLOGY | Facility: CLINIC | Age: 80
End: 2020-09-21
Payer: MEDICARE

## 2020-09-21 VITALS — BODY MASS INDEX: 28.99 KG/M2 | WEIGHT: 214 LBS | HEIGHT: 72 IN

## 2020-09-21 PROCEDURE — 11900 INJECT SKIN LESIONS </W 7: CPT

## 2020-09-21 PROCEDURE — 99213 OFFICE O/P EST LOW 20 MIN: CPT | Mod: 25

## 2020-11-02 ENCOUNTER — APPOINTMENT (OUTPATIENT)
Dept: DERMATOLOGY | Facility: CLINIC | Age: 80
End: 2020-11-02
Payer: MEDICARE

## 2020-11-02 VITALS — HEIGHT: 72 IN | BODY MASS INDEX: 26.82 KG/M2 | WEIGHT: 198 LBS

## 2020-11-02 PROCEDURE — 96910 PHOTCHMTX TAR&UVB/PTRLTM&UVB: CPT

## 2020-11-02 PROCEDURE — 11900 INJECT SKIN LESIONS </W 7: CPT

## 2020-11-02 PROCEDURE — 99214 OFFICE O/P EST MOD 30 MIN: CPT | Mod: 25

## 2020-11-04 ENCOUNTER — APPOINTMENT (OUTPATIENT)
Dept: DERMATOLOGY | Facility: CLINIC | Age: 80
End: 2020-11-04
Payer: MEDICARE

## 2020-11-04 PROCEDURE — 96910 PHOTCHMTX TAR&UVB/PTRLTM&UVB: CPT

## 2020-11-06 ENCOUNTER — APPOINTMENT (OUTPATIENT)
Dept: DERMATOLOGY | Facility: CLINIC | Age: 80
End: 2020-11-06
Payer: MEDICARE

## 2020-11-06 PROCEDURE — 96910 PHOTCHMTX TAR&UVB/PTRLTM&UVB: CPT

## 2020-11-06 RX ORDER — HALOBETASOL PROPIONATE 0.5 MG/G
0.05 OINTMENT TOPICAL
Qty: 2 | Refills: 6 | Status: ACTIVE | COMMUNITY
Start: 2018-03-23 | End: 1900-01-01

## 2020-11-09 ENCOUNTER — APPOINTMENT (OUTPATIENT)
Dept: DERMATOLOGY | Facility: CLINIC | Age: 80
End: 2020-11-09
Payer: MEDICARE

## 2020-11-09 PROCEDURE — 96910 PHOTCHMTX TAR&UVB/PTRLTM&UVB: CPT

## 2020-11-11 ENCOUNTER — APPOINTMENT (OUTPATIENT)
Dept: DERMATOLOGY | Facility: CLINIC | Age: 80
End: 2020-11-11
Payer: MEDICARE

## 2020-11-11 VITALS — TEMPERATURE: 97.8 F

## 2020-11-11 PROCEDURE — 96910 PHOTCHMTX TAR&UVB/PTRLTM&UVB: CPT

## 2020-11-13 ENCOUNTER — APPOINTMENT (OUTPATIENT)
Dept: DERMATOLOGY | Facility: CLINIC | Age: 80
End: 2020-11-13
Payer: MEDICARE

## 2020-11-13 PROCEDURE — 96910 PHOTCHMTX TAR&UVB/PTRLTM&UVB: CPT

## 2020-11-16 ENCOUNTER — APPOINTMENT (OUTPATIENT)
Dept: DERMATOLOGY | Facility: CLINIC | Age: 80
End: 2020-11-16
Payer: MEDICARE

## 2020-11-16 PROCEDURE — 96910 PHOTCHMTX TAR&UVB/PTRLTM&UVB: CPT

## 2020-11-18 ENCOUNTER — APPOINTMENT (OUTPATIENT)
Dept: DERMATOLOGY | Facility: CLINIC | Age: 80
End: 2020-11-18
Payer: MEDICARE

## 2020-11-18 PROCEDURE — 96910 PHOTCHMTX TAR&UVB/PTRLTM&UVB: CPT

## 2020-11-20 ENCOUNTER — APPOINTMENT (OUTPATIENT)
Dept: DERMATOLOGY | Facility: CLINIC | Age: 80
End: 2020-11-20
Payer: MEDICARE

## 2020-11-20 PROCEDURE — 96910 PHOTCHMTX TAR&UVB/PTRLTM&UVB: CPT

## 2020-11-23 ENCOUNTER — APPOINTMENT (OUTPATIENT)
Dept: DERMATOLOGY | Facility: CLINIC | Age: 80
End: 2020-11-23
Payer: MEDICARE

## 2020-11-23 PROCEDURE — 96910 PHOTCHMTX TAR&UVB/PTRLTM&UVB: CPT

## 2020-11-25 ENCOUNTER — APPOINTMENT (OUTPATIENT)
Dept: DERMATOLOGY | Facility: CLINIC | Age: 80
End: 2020-11-25
Payer: MEDICARE

## 2020-11-25 PROCEDURE — 96910 PHOTCHMTX TAR&UVB/PTRLTM&UVB: CPT

## 2020-11-27 ENCOUNTER — APPOINTMENT (OUTPATIENT)
Dept: DERMATOLOGY | Facility: CLINIC | Age: 80
End: 2020-11-27
Payer: MEDICARE

## 2020-11-27 PROCEDURE — 96910 PHOTCHMTX TAR&UVB/PTRLTM&UVB: CPT

## 2020-11-30 ENCOUNTER — APPOINTMENT (OUTPATIENT)
Dept: DERMATOLOGY | Facility: CLINIC | Age: 80
End: 2020-11-30
Payer: MEDICARE

## 2020-11-30 PROCEDURE — 96910 PHOTCHMTX TAR&UVB/PTRLTM&UVB: CPT

## 2020-12-02 ENCOUNTER — APPOINTMENT (OUTPATIENT)
Dept: DERMATOLOGY | Facility: CLINIC | Age: 80
End: 2020-12-02
Payer: MEDICARE

## 2020-12-02 PROCEDURE — 96910 PHOTCHMTX TAR&UVB/PTRLTM&UVB: CPT

## 2020-12-04 ENCOUNTER — APPOINTMENT (OUTPATIENT)
Dept: DERMATOLOGY | Facility: CLINIC | Age: 80
End: 2020-12-04
Payer: MEDICARE

## 2020-12-04 PROCEDURE — 96910 PHOTCHMTX TAR&UVB/PTRLTM&UVB: CPT

## 2020-12-07 ENCOUNTER — APPOINTMENT (OUTPATIENT)
Dept: DERMATOLOGY | Facility: CLINIC | Age: 80
End: 2020-12-07
Payer: MEDICARE

## 2020-12-07 PROCEDURE — 96910 PHOTCHMTX TAR&UVB/PTRLTM&UVB: CPT

## 2020-12-09 ENCOUNTER — APPOINTMENT (OUTPATIENT)
Dept: DERMATOLOGY | Facility: CLINIC | Age: 80
End: 2020-12-09
Payer: MEDICARE

## 2020-12-09 PROCEDURE — 96910 PHOTCHMTX TAR&UVB/PTRLTM&UVB: CPT

## 2020-12-11 ENCOUNTER — APPOINTMENT (OUTPATIENT)
Dept: DERMATOLOGY | Facility: CLINIC | Age: 80
End: 2020-12-11
Payer: MEDICARE

## 2020-12-11 PROCEDURE — 96910 PHOTCHMTX TAR&UVB/PTRLTM&UVB: CPT

## 2020-12-14 ENCOUNTER — APPOINTMENT (OUTPATIENT)
Dept: DERMATOLOGY | Facility: CLINIC | Age: 80
End: 2020-12-14
Payer: MEDICARE

## 2020-12-14 PROCEDURE — 96910 PHOTCHMTX TAR&UVB/PTRLTM&UVB: CPT

## 2020-12-16 ENCOUNTER — APPOINTMENT (OUTPATIENT)
Dept: DERMATOLOGY | Facility: CLINIC | Age: 80
End: 2020-12-16
Payer: MEDICARE

## 2020-12-16 PROCEDURE — 96910 PHOTCHMTX TAR&UVB/PTRLTM&UVB: CPT

## 2020-12-18 ENCOUNTER — APPOINTMENT (OUTPATIENT)
Dept: DERMATOLOGY | Facility: CLINIC | Age: 80
End: 2020-12-18
Payer: MEDICARE

## 2020-12-18 PROCEDURE — 96910 PHOTCHMTX TAR&UVB/PTRLTM&UVB: CPT

## 2020-12-21 ENCOUNTER — APPOINTMENT (OUTPATIENT)
Dept: DERMATOLOGY | Facility: CLINIC | Age: 80
End: 2020-12-21
Payer: MEDICARE

## 2020-12-21 PROCEDURE — 96910 PHOTCHMTX TAR&UVB/PTRLTM&UVB: CPT

## 2020-12-23 ENCOUNTER — APPOINTMENT (OUTPATIENT)
Dept: DERMATOLOGY | Facility: CLINIC | Age: 80
End: 2020-12-23
Payer: MEDICARE

## 2020-12-23 PROCEDURE — 96910 PHOTCHMTX TAR&UVB/PTRLTM&UVB: CPT

## 2020-12-28 ENCOUNTER — APPOINTMENT (OUTPATIENT)
Dept: DERMATOLOGY | Facility: CLINIC | Age: 80
End: 2020-12-28
Payer: MEDICARE

## 2020-12-28 PROCEDURE — 96910 PHOTCHMTX TAR&UVB/PTRLTM&UVB: CPT

## 2020-12-30 ENCOUNTER — APPOINTMENT (OUTPATIENT)
Dept: DERMATOLOGY | Facility: CLINIC | Age: 80
End: 2020-12-30
Payer: MEDICARE

## 2020-12-30 PROCEDURE — 96910 PHOTCHMTX TAR&UVB/PTRLTM&UVB: CPT

## 2020-12-31 NOTE — ED CDU PROVIDER NOTE - NS ED ATTENDING STATEMENT MOD
Attending Only English I have personally performed a face to face diagnostic evaluation on this patient. I have reviewed the PA note and agree with the history, exam, and plan of care, except as noted.

## 2021-01-26 ENCOUNTER — APPOINTMENT (OUTPATIENT)
Dept: DERMATOLOGY | Facility: CLINIC | Age: 81
End: 2021-01-26

## 2021-03-19 ENCOUNTER — APPOINTMENT (OUTPATIENT)
Dept: DERMATOLOGY | Facility: CLINIC | Age: 81
End: 2021-03-19
Payer: MEDICARE

## 2021-03-19 PROCEDURE — 99214 OFFICE O/P EST MOD 30 MIN: CPT | Mod: 25

## 2021-03-19 PROCEDURE — 96910 PHOTCHMTX TAR&UVB/PTRLTM&UVB: CPT

## 2021-03-22 ENCOUNTER — APPOINTMENT (OUTPATIENT)
Dept: DERMATOLOGY | Facility: CLINIC | Age: 81
End: 2021-03-22
Payer: MEDICARE

## 2021-03-22 PROCEDURE — 96910 PHOTCHMTX TAR&UVB/PTRLTM&UVB: CPT

## 2021-03-24 ENCOUNTER — APPOINTMENT (OUTPATIENT)
Dept: DERMATOLOGY | Facility: CLINIC | Age: 81
End: 2021-03-24
Payer: MEDICARE

## 2021-03-24 PROCEDURE — 96910 PHOTCHMTX TAR&UVB/PTRLTM&UVB: CPT

## 2021-03-26 ENCOUNTER — APPOINTMENT (OUTPATIENT)
Dept: DERMATOLOGY | Facility: CLINIC | Age: 81
End: 2021-03-26
Payer: MEDICARE

## 2021-03-26 PROCEDURE — 96910 PHOTCHMTX TAR&UVB/PTRLTM&UVB: CPT

## 2021-03-29 ENCOUNTER — APPOINTMENT (OUTPATIENT)
Dept: DERMATOLOGY | Facility: CLINIC | Age: 81
End: 2021-03-29
Payer: MEDICARE

## 2021-03-29 PROCEDURE — 96910 PHOTCHMTX TAR&UVB/PTRLTM&UVB: CPT

## 2021-03-31 ENCOUNTER — APPOINTMENT (OUTPATIENT)
Dept: DERMATOLOGY | Facility: CLINIC | Age: 81
End: 2021-03-31
Payer: MEDICARE

## 2021-03-31 PROCEDURE — 96910 PHOTCHMTX TAR&UVB/PTRLTM&UVB: CPT

## 2021-04-02 ENCOUNTER — APPOINTMENT (OUTPATIENT)
Dept: DERMATOLOGY | Facility: CLINIC | Age: 81
End: 2021-04-02
Payer: MEDICARE

## 2021-04-02 PROCEDURE — 96910 PHOTCHMTX TAR&UVB/PTRLTM&UVB: CPT

## 2021-04-05 ENCOUNTER — APPOINTMENT (OUTPATIENT)
Dept: DERMATOLOGY | Facility: CLINIC | Age: 81
End: 2021-04-05
Payer: MEDICARE

## 2021-04-05 PROCEDURE — 96910 PHOTCHMTX TAR&UVB/PTRLTM&UVB: CPT

## 2021-04-07 ENCOUNTER — APPOINTMENT (OUTPATIENT)
Dept: DERMATOLOGY | Facility: CLINIC | Age: 81
End: 2021-04-07
Payer: MEDICARE

## 2021-04-07 PROCEDURE — 96910 PHOTCHMTX TAR&UVB/PTRLTM&UVB: CPT

## 2021-04-09 ENCOUNTER — APPOINTMENT (OUTPATIENT)
Dept: DERMATOLOGY | Facility: CLINIC | Age: 81
End: 2021-04-09
Payer: MEDICARE

## 2021-04-09 PROCEDURE — 96910 PHOTCHMTX TAR&UVB/PTRLTM&UVB: CPT

## 2021-04-12 ENCOUNTER — APPOINTMENT (OUTPATIENT)
Dept: DERMATOLOGY | Facility: CLINIC | Age: 81
End: 2021-04-12
Payer: MEDICARE

## 2021-04-12 PROCEDURE — 96910 PHOTCHMTX TAR&UVB/PTRLTM&UVB: CPT

## 2021-04-14 ENCOUNTER — APPOINTMENT (OUTPATIENT)
Dept: DERMATOLOGY | Facility: CLINIC | Age: 81
End: 2021-04-14
Payer: MEDICARE

## 2021-04-14 PROCEDURE — 96910 PHOTCHMTX TAR&UVB/PTRLTM&UVB: CPT

## 2021-04-16 ENCOUNTER — APPOINTMENT (OUTPATIENT)
Dept: DERMATOLOGY | Facility: CLINIC | Age: 81
End: 2021-04-16
Payer: MEDICARE

## 2021-04-16 PROCEDURE — 96910 PHOTCHMTX TAR&UVB/PTRLTM&UVB: CPT

## 2021-04-19 ENCOUNTER — APPOINTMENT (OUTPATIENT)
Dept: DERMATOLOGY | Facility: CLINIC | Age: 81
End: 2021-04-19
Payer: MEDICARE

## 2021-04-19 PROCEDURE — 96910 PHOTCHMTX TAR&UVB/PTRLTM&UVB: CPT

## 2021-04-21 ENCOUNTER — APPOINTMENT (OUTPATIENT)
Dept: DERMATOLOGY | Facility: CLINIC | Age: 81
End: 2021-04-21
Payer: MEDICARE

## 2021-04-21 PROCEDURE — 96910 PHOTCHMTX TAR&UVB/PTRLTM&UVB: CPT

## 2021-04-23 ENCOUNTER — APPOINTMENT (OUTPATIENT)
Dept: DERMATOLOGY | Facility: CLINIC | Age: 81
End: 2021-04-23
Payer: MEDICARE

## 2021-04-23 PROCEDURE — 96910 PHOTCHMTX TAR&UVB/PTRLTM&UVB: CPT

## 2021-04-26 ENCOUNTER — APPOINTMENT (OUTPATIENT)
Dept: DERMATOLOGY | Facility: CLINIC | Age: 81
End: 2021-04-26
Payer: MEDICARE

## 2021-04-26 PROCEDURE — 96910 PHOTCHMTX TAR&UVB/PTRLTM&UVB: CPT

## 2021-04-28 ENCOUNTER — APPOINTMENT (OUTPATIENT)
Dept: DERMATOLOGY | Facility: CLINIC | Age: 81
End: 2021-04-28
Payer: MEDICARE

## 2021-04-28 PROCEDURE — 96910 PHOTCHMTX TAR&UVB/PTRLTM&UVB: CPT

## 2021-04-29 ENCOUNTER — APPOINTMENT (OUTPATIENT)
Dept: UROLOGY | Facility: CLINIC | Age: 81
End: 2021-04-29
Payer: MEDICARE

## 2021-04-29 PROCEDURE — 99214 OFFICE O/P EST MOD 30 MIN: CPT

## 2021-04-29 NOTE — ADDENDUM
[FreeTextEntry1] : Entered by Angelo Navarro, acting as scribe for Dr. Nnamdi Carrillo.\par \par The documentation recorded by the scribe accurately reflects the service I personally performed and the decisions made by me.\par

## 2021-04-29 NOTE — LETTER BODY
[FreeTextEntry1] : Dr. Lance Andres MD\par [address] [facility]\[place] [address]\par [phone]\par \par Reason for Visit: BPH. Balanitis. Back pain.\par \par This is a 80 year-old gentleman with chronic BPH and balanitis. He underwent an unremarkable renal ultrasound in Aug 2019. The patient returns today for follow-up. Since his last visit, the patient continues to take Flomax QD regularly without any side effects or difficulties with the medication. However, he reports of progressive weak urine flow and nocturia despite the medication. The patient also complains of nonspecific back pain. He continues to apply Clotrimazole-Betamethasone cream for his balanitis. He reports no pain. Patient denies any other changes in health. All other review of systems are negative. Past medical history, family history and social history were unchanged. Medications and allergies were reviewed. He has no known allergies to medication. \par \par On examination, the patient is an older-appearing gentleman in no acute distress. He is alert and oriented follows commands. He has normal mood and affect. He is normocephalic. Neck is supple. Oral no thrush Respirations are unlabored. His abdomen is soft and nontender. Bladder is nonpalpable. No CVA tenderness. Neurologically he is grossly intact. No peripheral edema. Balanitis is present. His phimosis is improving. He has normal male external genitalia. Normal meatus. Bilateral testes are descended intrascrotally and normal to palpation. On rectal examination, there is normal sphincter tone. The prostate is clinically benign without focal induration or nodularity. He has mild balanitis. \par \par ASSESSMENT: BPH, progressive symptoms on Flomax. Balanitis. Back pain, nonspecific.\par \par I counseled the patient. In terms of his BPH, he has progressive symptoms on Flomax. I discussed the options available to the patient. I recommended he undergo urodynamics testing and cystoscopy to evaluate the urinary outlet. I counseled the patient regarding the procedures. The risks and benefits were discussed. Alternatives were given. I answered the patient questions. He will proceed with the procedures. In terms of his nonspecific back pain, I discussed the various etiologies of his symptoms. I recommended he undergo a renal ultrasound to evaluate the etiology of his pain. In terms of his balanitis, I recommended he continue applying Clotrimazole-Betamethasone regularly as directed. He will repeat BMP and PSA today to ensure stability. Risks and alternatives were discussed. I answered the patient questions. The patient will follow-up as directed and will contact me with any questions or concerns. Thank you for the opportunity to participate in the care of Mr. PARNELL. I will keep you updated on his progress.\par \par Plan: Urodynamics testing. Cystoscopy. BMP. PSA. Renal ultrasound. Continue Clotrimazole-Betamethasone cream. Follow-up as directed.

## 2021-04-30 ENCOUNTER — APPOINTMENT (OUTPATIENT)
Dept: DERMATOLOGY | Facility: CLINIC | Age: 81
End: 2021-04-30

## 2021-04-30 LAB
ANION GAP SERPL CALC-SCNC: 11 MMOL/L
BUN SERPL-MCNC: 20 MG/DL
CALCIUM SERPL-MCNC: 9.6 MG/DL
CHLORIDE SERPL-SCNC: 105 MMOL/L
CO2 SERPL-SCNC: 23 MMOL/L
CREAT SERPL-MCNC: 1.45 MG/DL
GLUCOSE SERPL-MCNC: 95 MG/DL
POTASSIUM SERPL-SCNC: 6 MMOL/L
PSA FREE FLD-MCNC: 50 %
PSA FREE SERPL-MCNC: 1.99 NG/ML
PSA SERPL-MCNC: 3.99 NG/ML
SODIUM SERPL-SCNC: 139 MMOL/L

## 2021-05-03 ENCOUNTER — APPOINTMENT (OUTPATIENT)
Dept: DERMATOLOGY | Facility: CLINIC | Age: 81
End: 2021-05-03

## 2021-05-05 ENCOUNTER — APPOINTMENT (OUTPATIENT)
Dept: DERMATOLOGY | Facility: CLINIC | Age: 81
End: 2021-05-05

## 2021-05-07 ENCOUNTER — APPOINTMENT (OUTPATIENT)
Dept: DERMATOLOGY | Facility: CLINIC | Age: 81
End: 2021-05-07

## 2021-05-10 ENCOUNTER — OUTPATIENT (OUTPATIENT)
Dept: OUTPATIENT SERVICES | Facility: HOSPITAL | Age: 81
LOS: 1 days | End: 2021-05-10
Payer: MEDICARE

## 2021-05-10 ENCOUNTER — APPOINTMENT (OUTPATIENT)
Dept: DERMATOLOGY | Facility: CLINIC | Age: 81
End: 2021-05-10

## 2021-05-10 ENCOUNTER — APPOINTMENT (OUTPATIENT)
Dept: UROLOGY | Facility: CLINIC | Age: 81
End: 2021-05-10
Payer: MEDICARE

## 2021-05-10 DIAGNOSIS — N28.9 DISORDER OF KIDNEY AND URETER, UNSPECIFIED: ICD-10-CM

## 2021-05-10 DIAGNOSIS — R35.0 FREQUENCY OF MICTURITION: ICD-10-CM

## 2021-05-10 PROCEDURE — 99214 OFFICE O/P EST MOD 30 MIN: CPT

## 2021-05-10 PROCEDURE — 76775 US EXAM ABDO BACK WALL LIM: CPT

## 2021-05-10 NOTE — LETTER BODY
[FreeTextEntry1] : Dr. Lance Andres MD\par [address] [facility]\par [place] [id number]\par [phone]\par \par Reason for Visit: BPH. Balanitis. Back pain.\par \par This is a 80 year-old gentleman with chronic BPH, balanitis, and back pain. The patient underwent an unremarkable renal ultrasound in Aug 2019. He returns today for follow-up and renal ultrasound. Since he was last seen, he continues to take Flomax QD regularly without any side effects or difficulties with the medication. However, he reports of progressive weak urine flow and nocturia despite the medication. The patient also complains of nonspecific back pain. He reports no hematuria, dysuria, or urinary incontinence. He is overall well. Patient denies any changes in health. All other review of systems are negative. Past medical history, family history and social history were unchanged. Medications and allergies were reviewed. He has no known allergies to medication. \par \par On examination, the patient is an older-appearing gentleman in no acute distress. He is alert and oriented follows commands. He has normal mood and affect. He is normocephalic. Neck is supple. Oral no thrush Respirations are unlabored. His abdomen is soft and nontender. Bladder is nonpalpable. No CVA tenderness. Neurologically he is grossly intact. No peripheral edema. Balanitis is present. His phimosis is improving. He has normal male external genitalia. Normal meatus. Bilateral testes are descended intrascrotally and normal to palpation. On rectal examination, there is normal sphincter tone. The prostate is clinically benign without focal induration or nodularity. He has mild balanitis. \par \par His BMP demonstrated decreased renal functions, creatinine 1.45. His PSA was 3.99, which is within normal limits.\par \par I personally reviewed ultrasound images with the patient today and images demonstrated a 9.8 mm x 9.3 mm hyperechoic lesion in the mid pole of the right kidney. Again visualized a 1.1 cm x 1.1 cm exophytic simple cyst in the midpole of the right kidney and a 1.0 cm x 1.0 cm simple cyst in the upper pole of the left kidney. Both kidneys are normal in size and echogenicity without obvious stones or hydronephrosis visualized. \par \par ASSESSMENT: BPH, progressive symptoms on Flomax. Balanitis. Back pain, nonspecific. Elevated creatinine.\par \par I counseled the patient. In terms of his nonspecific back pain, his renal ultrasound today demonstrated no evidence of hydronephrosis. I discussed that the etiology of his pain remains unclear. In terms of his BPH, since he has progressive symptoms on medical therapy, the patient will proceed with urodynamics testing and cystoscopy to evaluate the urinary outlet as scheduled in 2 weeks. Risks and alternatives were discussed. I answered the patient questions. The patient will follow-up as directed and will contact me with any questions or concerns. Thank you for the opportunity to participate in the care of Mr. PARNELL. I will keep you updated on his progress.\par \par Plan: Follow-up in 2 weeks for UDS testing and cystoscopy.

## 2021-05-12 ENCOUNTER — APPOINTMENT (OUTPATIENT)
Dept: DERMATOLOGY | Facility: CLINIC | Age: 81
End: 2021-05-12

## 2021-05-14 ENCOUNTER — APPOINTMENT (OUTPATIENT)
Dept: DERMATOLOGY | Facility: CLINIC | Age: 81
End: 2021-05-14

## 2021-05-17 ENCOUNTER — APPOINTMENT (OUTPATIENT)
Dept: DERMATOLOGY | Facility: CLINIC | Age: 81
End: 2021-05-17

## 2021-05-19 ENCOUNTER — APPOINTMENT (OUTPATIENT)
Dept: DERMATOLOGY | Facility: CLINIC | Age: 81
End: 2021-05-19

## 2021-05-21 ENCOUNTER — APPOINTMENT (OUTPATIENT)
Dept: DERMATOLOGY | Facility: CLINIC | Age: 81
End: 2021-05-21

## 2021-05-24 ENCOUNTER — APPOINTMENT (OUTPATIENT)
Dept: DERMATOLOGY | Facility: CLINIC | Age: 81
End: 2021-05-24

## 2021-05-26 ENCOUNTER — APPOINTMENT (OUTPATIENT)
Dept: DERMATOLOGY | Facility: CLINIC | Age: 81
End: 2021-05-26

## 2021-05-27 ENCOUNTER — APPOINTMENT (OUTPATIENT)
Dept: UROLOGY | Facility: CLINIC | Age: 81
End: 2021-05-27

## 2021-05-27 VITALS — SYSTOLIC BLOOD PRESSURE: 215 MMHG | DIASTOLIC BLOOD PRESSURE: 83 MMHG

## 2021-05-27 VITALS
HEART RATE: 64 BPM | SYSTOLIC BLOOD PRESSURE: 197 MMHG | OXYGEN SATURATION: 98 % | DIASTOLIC BLOOD PRESSURE: 83 MMHG | RESPIRATION RATE: 16 BRPM

## 2021-05-27 NOTE — LETTER BODY
[FreeTextEntry1] : Dr. Lance Andres MD\par [address] [facility]\[place] [address]\par [phone]\par \par Reason for Visit: BPH. Balanitis. Back pain.\par \par This is a 81 year-old gentleman with chronic BPH, balanitis, and back pain. The patient underwent an unremarkable renal ultrasound in Aug 2019. The patient returns today for urodynamics testing and cystoscopy. Since his last visit, he continues to take Flomax QD regularly without any side effects or difficulties. However, he continues to complain of progressive weak urine flow and nocturia despite medical therapy. He denies any hematuria, dysuria, or urinary incontinence. He is overall well. Patient denies any changes in health. All other review of systems are negative. Past medical history, family history and social history were unchanged. Medications and allergies were reviewed. He has no known allergies to medication. \par \par On examination, the patient is an older-appearing gentleman in no acute distress. He is alert and oriented follows commands. He has normal mood and affect. He is normocephalic. Neck is supple. Oral no thrush Respirations are unlabored. His abdomen is soft and nontender. Bladder is nonpalpable. No CVA tenderness. Neurologically he is grossly intact. No peripheral edema. Balanitis is present. His phimosis is improving. He has normal male external genitalia. Normal meatus. Bilateral testes are descended intrascrotally and normal to palpation. On rectal examination, there is normal sphincter tone. The prostate is clinically benign without focal induration or nodularity. He has mild balanitis. \par \par The patient was scheduled to undergo urodynamics testing and cystoscopy today. However, he was hypertensive, 215/95.\par \par ASSESSMENT: BPH, progressive symptoms on Flomax. Balanitis. Back pain, nonspecific. Elevated creatinine. Hypertension.\par \par I counseled the patient. In terms of his hypertension, the patient will postpone urodynamics testing and cystoscopy. I recommended he see you for further evaluation and management. Risks and alternatives were discussed. I answered the patient questions. The patient will follow-up as directed and will contact me with any questions or concerns. Thank you for the opportunity to participate in the care of Mr. PARNELL. I will keep you updated on his progress.\par \par Plan: See PCP. Follow-up as directed.

## 2021-05-28 ENCOUNTER — APPOINTMENT (OUTPATIENT)
Dept: DERMATOLOGY | Facility: CLINIC | Age: 81
End: 2021-05-28

## 2021-06-02 ENCOUNTER — APPOINTMENT (OUTPATIENT)
Dept: DERMATOLOGY | Facility: CLINIC | Age: 81
End: 2021-06-02

## 2021-06-04 ENCOUNTER — APPOINTMENT (OUTPATIENT)
Dept: DERMATOLOGY | Facility: CLINIC | Age: 81
End: 2021-06-04

## 2021-06-17 ENCOUNTER — APPOINTMENT (OUTPATIENT)
Dept: UROLOGY | Facility: CLINIC | Age: 81
End: 2021-06-17

## 2021-06-17 ENCOUNTER — OUTPATIENT (OUTPATIENT)
Dept: OUTPATIENT SERVICES | Facility: HOSPITAL | Age: 81
LOS: 1 days | End: 2021-06-17
Payer: MEDICARE

## 2021-06-17 ENCOUNTER — APPOINTMENT (OUTPATIENT)
Dept: UROLOGY | Facility: CLINIC | Age: 81
End: 2021-06-17
Payer: MEDICARE

## 2021-06-17 VITALS
TEMPERATURE: 98 F | DIASTOLIC BLOOD PRESSURE: 81 MMHG | RESPIRATION RATE: 17 BRPM | SYSTOLIC BLOOD PRESSURE: 184 MMHG | HEART RATE: 73 BPM | OXYGEN SATURATION: 98 %

## 2021-06-17 DIAGNOSIS — N40.1 BENIGN PROSTATIC HYPERPLASIA WITH LOWER URINARY TRACT SYMPTOMS: ICD-10-CM

## 2021-06-17 DIAGNOSIS — N48.0 LEUKOPLAKIA OF PENIS: ICD-10-CM

## 2021-06-17 PROCEDURE — 51741 ELECTRO-UROFLOWMETRY FIRST: CPT | Mod: 26

## 2021-06-17 PROCEDURE — 51728 CYSTOMETROGRAM W/VP: CPT

## 2021-06-17 PROCEDURE — 51741 ELECTRO-UROFLOWMETRY FIRST: CPT

## 2021-06-17 PROCEDURE — 52000 CYSTOURETHROSCOPY: CPT

## 2021-06-17 PROCEDURE — 51784 ANAL/URINARY MUSCLE STUDY: CPT

## 2021-06-17 PROCEDURE — 51797 INTRAABDOMINAL PRESSURE TEST: CPT | Mod: 26

## 2021-06-17 PROCEDURE — 51784 ANAL/URINARY MUSCLE STUDY: CPT | Mod: 26

## 2021-06-17 PROCEDURE — 51728 CYSTOMETROGRAM W/VP: CPT | Mod: 26

## 2021-06-17 PROCEDURE — 99214 OFFICE O/P EST MOD 30 MIN: CPT | Mod: 25

## 2021-06-17 PROCEDURE — 51797 INTRAABDOMINAL PRESSURE TEST: CPT

## 2021-06-17 NOTE — LETTER BODY
[FreeTextEntry1] : Dr. Lance Andres MD\par [address] [facility]\par [place] [id number]\par [phone]\par \par Reason for Visit: BPH. Balanitis. Back pain.\par \par This is a 81 year-old gentleman with chronic BPH, balanitis, and back pain. The patient underwent an unremarkable renal ultrasound in Aug 2019. The patient returns today for urodynamics testing and cystoscopy. He previously could not undergo urodynamics testing and cystoscopy due to being hypertensive. Since his last visit, he continues to complain of progressive weak urine flow and nocturia despite medical therapy. He denies any hematuria, dysuria, or urinary incontinence. He is overall well. Patient denies any changes in health. All other review of systems are negative. Past medical history, family history and social history were unchanged. Medications and allergies were reviewed. He has no known allergies to medication. \par \par On examination, the patient is an older-appearing gentleman in no acute distress. He is alert and oriented follows commands. He has normal mood and affect. He is normocephalic. Neck is supple. Oral no thrush Respirations are unlabored. His abdomen is soft and nontender. Bladder is nonpalpable. No CVA tenderness. Neurologically he is grossly intact. No peripheral edema. Balanitis is present. His phimosis is improving. He has normal male external genitalia. Normal meatus. Bilateral testes are descended intrascrotally and normal to palpation. On rectal examination, there is normal sphincter tone. The prostate is clinically benign without focal induration or nodularity. He has mild balanitis. \par \par His cystoscopy today demonstrated bilobar hypertrophy. \par \par Hi urodynamic testing demonstrated normal bladder sensation.and capacity. The uroflow rate is decreased. The uroflow pattern is plateaued. The detrusor contractility is normal. The patient has bladder outlet obstruction. The patient has incomplete bladder emptying, a post void residual of 171 cc. The spincter activity is normal synergic. \par \par ASSESSMENT: BPH, progressive symptoms. Balanitis. Back pain.\par \par I counseled the patient. In terms of his BPH, I recommended the patient consider GreenLight laser vaporization of prostate. I counseled the patient regarding the procedure. The risks and benefits were discussed. Alternatives were given. I answered the patient questions.  The patient will follow-up as directed and will contact me with any questions or concerns. Thank you for the opportunity to participate in the care of Mr. PARNELL. I will keep you updated on his progress.\par \par Plan: Consider GreenLight laser vaporization of prostate. Follow-up as directed.

## 2021-06-17 NOTE — ADDENDUM
[FreeTextEntry1] : Entered by Monisha Marr, acting as scribe for Dr. Nnamdi Carrillo.\par \par The documentation recorded by the scribe accurately reflects the service I personally performed and the decisions made by me.

## 2021-06-17 NOTE — HISTORY OF PRESENT ILLNESS
[FreeTextEntry1] : Please refer to URO Consult note \par \par \par uds patient \par prostate bph \par uds shows obstruction \par biloabar hypertrophy \par \par offcie not fu bph) \par uds consider greenlight

## 2021-06-24 ENCOUNTER — APPOINTMENT (OUTPATIENT)
Dept: UROLOGY | Facility: CLINIC | Age: 81
End: 2021-06-24

## 2021-06-24 ENCOUNTER — APPOINTMENT (OUTPATIENT)
Dept: UROLOGY | Facility: CLINIC | Age: 81
End: 2021-06-24
Payer: MEDICARE

## 2021-06-24 PROCEDURE — 99214 OFFICE O/P EST MOD 30 MIN: CPT

## 2021-06-24 NOTE — LETTER BODY
[FreeTextEntry1] : Dr. Lance Andres MD\par [address] [facility]\[place] [address]\par [phone]\par \par Reason for Visit: BPH. Balanitis. Back pain.\par \par This is a 81 year-old gentleman with chronic BPH, balanitis, and back pain. The patient underwent an unremarkable renal ultrasound in Aug 2019. The patient returns today for follow-up. Since he was last seen , the patient has undergone urodynamics testing and cystoscopy. His urodynamics testing showed incomplete bladder emptying and a bladder outlet obstruction. His cystoscopy showed bilobar hypertrophy.  The patient  has been taking Flomax without any side effects or difficulties. He reports strong urine flow on Flomax. The patient continues to complain of progressive urinary frequency every hour and nocturia. He denies any hematuria, dysuria, or urinary incontinence. He is overall well. Patient denies any changes in health. All other review of systems are negative. Past medical history, family history and social history were unchanged. Medications and allergies were reviewed. He has no known allergies to medication. \par \par On examination, the patient is an older-appearing gentleman in no acute distress. He is alert and oriented follows commands. He has normal mood and affect. He is normocephalic. Neck is supple. Oral no thrush Respirations are unlabored. His abdomen is soft and nontender. Bladder is nonpalpable. No CVA tenderness. Neurologically he is grossly intact. No peripheral edema. Balanitis is present. His phimosis is improving. He has normal male external genitalia. Normal meatus. Bilateral testes are descended intrascrotally and normal to palpation. On rectal examination, there is normal sphincter tone. The prostate is clinically benign without focal induration or nodularity. He has mild balanitis. \par \par His recent cystoscopy demonstrated bilobar hypertrophy. \par \par His recent urodynamic testing demonstrated normal bladder sensation.and capacity. The uroflow rate is decreased. The uroflow pattern is plateaued. The detrusor contractility is normal. The patient has bladder outlet obstruction. The patient has incomplete bladder emptying, a post void residual of 171 cc. The spincter activity is normal synergic. \par \par ASSESSMENT: BPH, progressive symptoms. Balanitis. Back pain.\par \par I counseled the patient. In terms of his BPH, I recommended the patient consider GreenLight laser vaporization of prostate. I counseled the patient regarding the procedure. The risks and benefits were discussed. Alternatives were given. I answered the patient questions. He declines surgery and wishes to try medical therapy. I recommended he continue Flomax and begin  trial of Oxybutynin. The patient denies any history of glaucoma. I discussed the potential side effects of the medication. I counseled the patient on its use and side effects. If the patient develops any side effects, the patient will discontinue the medication and contact me. I renewed the patient's prescription for Flomax today. I encouraged the patient to continue medications regularly as directed.  The patient will follow-up as directed and will contact me with any questions or concerns. Thank you for the opportunity to participate in the care of Mr. PARNELL. I will keep you updated on his progress.\par \par Plan: Continue Flomax. Trial of Oxybutynin. Follow-up in 1 month

## 2021-06-24 NOTE — HISTORY OF PRESENT ILLNESS
[FreeTextEntry1] : Please refer to URO Consult note \par \par freq uri \par prev urodynamics no acitivity \par strong urine flow with flomax \par freq urination every hour \par declines surg \par try medical therpay \par oxy come back 1 month

## 2021-06-30 ENCOUNTER — APPOINTMENT (OUTPATIENT)
Dept: DERMATOLOGY | Facility: CLINIC | Age: 81
End: 2021-06-30
Payer: MEDICARE

## 2021-06-30 PROCEDURE — 96910 PHOTCHMTX TAR&UVB/PTRLTM&UVB: CPT

## 2021-07-02 ENCOUNTER — APPOINTMENT (OUTPATIENT)
Dept: DERMATOLOGY | Facility: CLINIC | Age: 81
End: 2021-07-02

## 2021-07-07 ENCOUNTER — APPOINTMENT (OUTPATIENT)
Dept: DERMATOLOGY | Facility: CLINIC | Age: 81
End: 2021-07-07
Payer: MEDICARE

## 2021-07-07 PROCEDURE — 96910 PHOTCHMTX TAR&UVB/PTRLTM&UVB: CPT

## 2021-07-09 ENCOUNTER — APPOINTMENT (OUTPATIENT)
Dept: DERMATOLOGY | Facility: CLINIC | Age: 81
End: 2021-07-09
Payer: MEDICARE

## 2021-07-09 PROCEDURE — 96910 PHOTCHMTX TAR&UVB/PTRLTM&UVB: CPT

## 2021-07-12 ENCOUNTER — APPOINTMENT (OUTPATIENT)
Dept: DERMATOLOGY | Facility: CLINIC | Age: 81
End: 2021-07-12

## 2021-07-12 NOTE — DISCUSSION/SUMMARY
[FreeTextEntry1] : ARTIS  is here to restart NBUVB therapy treatment for Atopic dermatitis L 20.9\par Starting dose is 250 mJ increasing by 15% as tolerated three times a week, holding dose at 1500 mJ as Dr Ahmadi prescribed. \par \par Pt tolerated treatment well, mineral oil applied.\par \par Today's treatment:7/9/2021  332 mJ 1m 45 seconds \par  \par  7/7/2021  290 mJ 1m 34 seconds \par \par 6/30/21  250 mJ 1m 9 seconds

## 2021-07-14 ENCOUNTER — APPOINTMENT (OUTPATIENT)
Dept: DERMATOLOGY | Facility: CLINIC | Age: 81
End: 2021-07-14

## 2021-07-19 ENCOUNTER — APPOINTMENT (OUTPATIENT)
Dept: DERMATOLOGY | Facility: CLINIC | Age: 81
End: 2021-07-19

## 2021-07-22 ENCOUNTER — APPOINTMENT (OUTPATIENT)
Dept: DERMATOLOGY | Facility: CLINIC | Age: 81
End: 2021-07-22
Payer: MEDICARE

## 2021-07-22 PROCEDURE — 96910 PHOTCHMTX TAR&UVB/PTRLTM&UVB: CPT

## 2021-07-23 ENCOUNTER — APPOINTMENT (OUTPATIENT)
Dept: DERMATOLOGY | Facility: CLINIC | Age: 81
End: 2021-07-23
Payer: MEDICARE

## 2021-07-23 PROCEDURE — 11900 INJECT SKIN LESIONS </W 7: CPT

## 2021-07-23 PROCEDURE — 99213 OFFICE O/P EST LOW 20 MIN: CPT | Mod: 25

## 2021-07-26 ENCOUNTER — APPOINTMENT (OUTPATIENT)
Dept: UROLOGY | Facility: CLINIC | Age: 81
End: 2021-07-26
Payer: MEDICARE

## 2021-07-26 PROCEDURE — 99214 OFFICE O/P EST MOD 30 MIN: CPT

## 2021-07-26 RX ORDER — OXYBUTYNIN CHLORIDE 5 MG/1
5 TABLET, EXTENDED RELEASE ORAL
Qty: 30 | Refills: 3 | Status: COMPLETED | COMMUNITY
Start: 2021-06-24 | End: 2021-07-26

## 2021-07-26 NOTE — LETTER BODY
[FreeTextEntry1] : Dr. Lance Andres MD\par [address] [facility]\[place] [address]\par [phone]\par \par Dear Dr. Andres, \par \par Reason for Visit: BPH. Balanitis. \par \par This is a 81 year-old gentleman with chronic BPH, balanitis, and back pain. The patient underwent an unremarkable renal ultrasound in Aug 2019. His urodynamics testing and cystoscopy showed incomplete bladder emptying and a bladder outlet obstruction. His cystoscopy showed bilobar hypertrophy.  The patient returns today for follow-up. Since he was last seen, the patient has been taking Flomax and Oxybutynin without any side effects or difficulties. He reports strong urine flow on Flomax. The patient continues to complain of progressive urinary frequency every hour and nocturia despite taking Oxybutynin. He denies any hematuria, dysuria, or urinary incontinence. He is overall well. Patient denies any changes in health. All other review of systems are negative. Past medical history, family history and social history were unchanged. Medications and allergies were reviewed. He has no known allergies to medication. \par \par On examination, the patient is an older-appearing gentleman in no acute distress. He is alert and oriented follows commands. He has normal mood and affect. He is normocephalic. Neck is supple. Oral no thrush Respirations are unlabored. His abdomen is soft and nontender. Bladder is nonpalpable. No CVA tenderness. Neurologically he is grossly intact. No peripheral edema. Balanitis is present. His phimosis is improving. He has normal male external genitalia. Normal meatus. Bilateral testes are descended intrascrotally and normal to palpation. On rectal examination, there is normal sphincter tone. The prostate is clinically benign without focal induration or nodularity. He has mild balanitis. \par \par ASSESSMENT: BPH, progressive symptoms. Balanitis.  \par \par I counseled the patient. In terms of his BPH, I recommended he continue Flomax and begin trial of Myrbetriq. I discussed the potential side effects of the medication. I counseled the patient on its use and side effects. If the patient develops any side effects, the patient will discontinue the medication and contact me. I encouraged the patient to continue medications regularly as directed. Patient understands that if he develops gross hematuria or any urinary discomfort, he will contact me for further evaluation. The patient will follow-up as directed and will contact me with any questions or concerns. Thank you for the opportunity to participate in the care of Mr. PARNELL. I will keep you updated on his progress.\par \par Plan: Trial of Myrbetriq.  Continue Flomax. Follow-up as directed.

## 2021-07-27 ENCOUNTER — APPOINTMENT (OUTPATIENT)
Dept: DERMATOLOGY | Facility: CLINIC | Age: 81
End: 2021-07-27
Payer: MEDICARE

## 2021-07-27 PROCEDURE — 96910 PHOTCHMTX TAR&UVB/PTRLTM&UVB: CPT

## 2021-07-29 ENCOUNTER — APPOINTMENT (OUTPATIENT)
Dept: DERMATOLOGY | Facility: CLINIC | Age: 81
End: 2021-07-29
Payer: MEDICARE

## 2021-07-29 PROCEDURE — 96910 PHOTCHMTX TAR&UVB/PTRLTM&UVB: CPT

## 2021-08-03 ENCOUNTER — APPOINTMENT (OUTPATIENT)
Dept: DERMATOLOGY | Facility: CLINIC | Age: 81
End: 2021-08-03
Payer: MEDICARE

## 2021-08-03 PROCEDURE — 96910 PHOTCHMTX TAR&UVB/PTRLTM&UVB: CPT

## 2021-08-05 ENCOUNTER — APPOINTMENT (OUTPATIENT)
Dept: DERMATOLOGY | Facility: CLINIC | Age: 81
End: 2021-08-05
Payer: MEDICARE

## 2021-08-05 PROCEDURE — D0181: CPT

## 2021-08-05 PROCEDURE — 96910 PHOTCHMTX TAR&UVB/PTRLTM&UVB: CPT

## 2021-08-10 ENCOUNTER — APPOINTMENT (OUTPATIENT)
Dept: DERMATOLOGY | Facility: CLINIC | Age: 81
End: 2021-08-10
Payer: MEDICARE

## 2021-08-10 PROCEDURE — 96910 PHOTCHMTX TAR&UVB/PTRLTM&UVB: CPT

## 2021-08-12 ENCOUNTER — APPOINTMENT (OUTPATIENT)
Dept: DERMATOLOGY | Facility: CLINIC | Age: 81
End: 2021-08-12
Payer: MEDICARE

## 2021-08-12 PROCEDURE — 96910 PHOTCHMTX TAR&UVB/PTRLTM&UVB: CPT

## 2021-08-17 ENCOUNTER — APPOINTMENT (OUTPATIENT)
Dept: DERMATOLOGY | Facility: CLINIC | Age: 81
End: 2021-08-17
Payer: MEDICARE

## 2021-08-17 PROCEDURE — 96910 PHOTCHMTX TAR&UVB/PTRLTM&UVB: CPT

## 2021-08-18 DIAGNOSIS — R35.0 FREQUENCY OF MICTURITION: ICD-10-CM

## 2021-08-19 ENCOUNTER — APPOINTMENT (OUTPATIENT)
Dept: DERMATOLOGY | Facility: CLINIC | Age: 81
End: 2021-08-19
Payer: MEDICARE

## 2021-08-19 PROCEDURE — 96910 PHOTCHMTX TAR&UVB/PTRLTM&UVB: CPT

## 2021-08-24 ENCOUNTER — APPOINTMENT (OUTPATIENT)
Dept: DERMATOLOGY | Facility: CLINIC | Age: 81
End: 2021-08-24
Payer: MEDICARE

## 2021-08-24 PROCEDURE — 96910 PHOTCHMTX TAR&UVB/PTRLTM&UVB: CPT

## 2021-08-26 ENCOUNTER — APPOINTMENT (OUTPATIENT)
Dept: UROLOGY | Facility: CLINIC | Age: 81
End: 2021-08-26
Payer: MEDICARE

## 2021-08-26 ENCOUNTER — APPOINTMENT (OUTPATIENT)
Dept: DERMATOLOGY | Facility: CLINIC | Age: 81
End: 2021-08-26
Payer: MEDICARE

## 2021-08-26 DIAGNOSIS — Z00.00 ENCOUNTER FOR GENERAL ADULT MEDICAL EXAMINATION W/OUT ABNORMAL FINDINGS: ICD-10-CM

## 2021-08-26 PROCEDURE — 99214 OFFICE O/P EST MOD 30 MIN: CPT

## 2021-08-26 PROCEDURE — 96910 PHOTCHMTX TAR&UVB/PTRLTM&UVB: CPT

## 2021-08-26 NOTE — LETTER BODY
[FreeTextEntry1] : \par Dr. Lance Andres MD\par [address] [facility] [place]\[place] [address]\par [phone]\par \par Dear Dr. Andres, \par \par Reason for Visit: BPH. Balanitis. Right lank pain. \par \par This is a 81 year-old gentleman with chronic BPH, balanitis, and back pain. The patient underwent an unremarkable renal ultrasound in August 2019. His urodynamics testing and cystoscopy showed incomplete bladder emptying and a bladder outlet obstruction. His cystoscopy showed bilobar hypertrophy. The patient returns today for follow-up. Since he was last seen, the patient has been taking Flomax and Myrbetriq 25 mg without any side effects or difficulties. He reports strong urine flow on Flomax. The patient reports decreased urinary frequency. He also reports right flank pain. Pain score is 3 out of 10. He denies any hematuria, dysuria, or urinary incontinence. He is overall well. Patient denies any changes in health. All other review of systems are negative. Past medical history, family history and social history were unchanged. Medications and allergies were reviewed. He has no known allergies to medication. \par \par On examination, the patient is an older-appearing gentleman in no acute distress. He is alert and oriented follows commands. He has normal mood and affect. He is normocephalic. Neck is supple. Oral no thrush Respirations are unlabored. His abdomen is soft and nontender. Bladder is nonpalpable. No CVA tenderness. Neurologically he is grossly intact. No peripheral edema. Balanitis is present. His phimosis is improving. He has normal male external genitalia. Normal meatus. Bilateral testes are descended intrascrotally and normal to palpation. On rectal examination, there is normal sphincter tone. The prostate is clinically benign without focal induration or nodularity. \par \par ASSESSMENT: BPH, progressive symptoms. Balanitis. Right Flank pain. \par \par I counseled the patient. In terms of his BPH, I recommended he continue Flomax and increase Myrbetriq to 50 mg. I renewed the patient's prescription for Myrbetriq today. I encouraged the patient to continue medications regularly as directed. in terms of his right flank pain, I recommended the patient undergo a CT of abdomen and pelvis to evaluate for any stone burden. Patient understands that if he develops gross hematuria or any urinary discomfort, he will contact me for further evaluation. The patient will follow-up as directed and will contact me with any questions or concerns. Thank you for the opportunity to participate in the care of Mr. PARNELL. I will keep you updated on his progress.\par \par Plan: Increase Myrbetriq to 50 mg. Continue Flomax. CT renal stone hunt.  Follow-up as directed. Followup in 3 months.

## 2021-08-26 NOTE — HISTORY OF PRESENT ILLNESS
[FreeTextEntry1] : Please refer to URO Consult note \par \par increase Myrbetriq to 50 mg  \par cat scan for flank pain

## 2021-08-26 NOTE — LETTER BODY
[FreeTextEntry1] : \par Dr. Lance Andres MD\par [address] [facility]\[place] [address]\par [phone]\par \par Dear Dr. Andres, \par \par Reason for Visit: BPH. Balanitis. Right lank pain. \par \par This is a 81 year-old gentleman with chronic BPH, balanitis, and back pain. The patient underwent an unremarkable renal ultrasound in August 2019. His urodynamics testing and cystoscopy showed incomplete bladder emptying and a bladder outlet obstruction. His cystoscopy showed bilobar hypertrophy. The patient returns today for follow-up. Since he was last seen, the patient has been taking Flomax and Myrbetriq 25 mg without any side effects or difficulties. He reports strong urine flow on Flomax. The patient reports decreased urinary frequency. He also reports right flank pain. Pain score is 3 out of 10. He denies any hematuria, dysuria, or urinary incontinence. He is overall well. Patient denies any changes in health. All other review of systems are negative. Past medical history, family history and social history were unchanged. Medications and allergies were reviewed. He has no known allergies to medication. \par \par On examination, the patient is an older-appearing gentleman in no acute distress. He is alert and oriented follows commands. He has normal mood and affect. He is normocephalic. Neck is supple. Oral no thrush Respirations are unlabored. His abdomen is soft and nontender. Bladder is nonpalpable. No CVA tenderness. Neurologically he is grossly intact. No peripheral edema. Balanitis is present. His phimosis is improving. He has normal male external genitalia. Normal meatus. Bilateral testes are descended intrascrotally and normal to palpation. On rectal examination, there is normal sphincter tone. The prostate is clinically benign without focal induration or nodularity. \par \par ASSESSMENT: BPH, progressive symptoms. Balanitis. Right Flank pain. \par \par I counseled the patient. In terms of his BPH, I recommended he continue Flomax and increase Myrbetriq to 50 mg. I renewed the patient's prescription for Myrbetriq today. I encouraged the patient to continue medications regularly as directed. in terms of his right flank pain, I recommended the patient undergo a CT of abdomen and pelvis to evaluate for any stone burden. Patient understands that if he develops gross hematuria or any urinary discomfort, he will contact me for further evaluation. The patient will follow-up as directed and will contact me with any questions or concerns. Thank you for the opportunity to participate in the care of Mr. PARNELL. I will keep you updated on his progress.\par \par Plan: Increase Myrbetriq to 50 mg. Continue Flomax. CT renal stone hunt.  Follow-up as directed. Followup in 3 months.

## 2021-08-30 ENCOUNTER — OUTPATIENT (OUTPATIENT)
Dept: OUTPATIENT SERVICES | Facility: HOSPITAL | Age: 81
LOS: 1 days | End: 2021-08-30
Payer: MEDICARE

## 2021-08-30 ENCOUNTER — APPOINTMENT (OUTPATIENT)
Dept: CT IMAGING | Facility: IMAGING CENTER | Age: 81
End: 2021-08-30
Payer: MEDICARE

## 2021-08-30 DIAGNOSIS — R10.9 UNSPECIFIED ABDOMINAL PAIN: ICD-10-CM

## 2021-08-30 DIAGNOSIS — Z00.00 ENCOUNTER FOR GENERAL ADULT MEDICAL EXAMINATION WITHOUT ABNORMAL FINDINGS: ICD-10-CM

## 2021-08-30 PROCEDURE — 74176 CT ABD & PELVIS W/O CONTRAST: CPT | Mod: 26,MH

## 2021-08-30 PROCEDURE — 74176 CT ABD & PELVIS W/O CONTRAST: CPT

## 2021-08-31 ENCOUNTER — APPOINTMENT (OUTPATIENT)
Dept: DERMATOLOGY | Facility: CLINIC | Age: 81
End: 2021-08-31
Payer: MEDICARE

## 2021-08-31 PROCEDURE — 96910 PHOTCHMTX TAR&UVB/PTRLTM&UVB: CPT

## 2021-09-02 ENCOUNTER — APPOINTMENT (OUTPATIENT)
Dept: DERMATOLOGY | Facility: CLINIC | Age: 81
End: 2021-09-02
Payer: MEDICARE

## 2021-09-02 ENCOUNTER — APPOINTMENT (OUTPATIENT)
Dept: UROLOGY | Facility: CLINIC | Age: 81
End: 2021-09-02
Payer: MEDICARE

## 2021-09-02 PROCEDURE — 99214 OFFICE O/P EST MOD 30 MIN: CPT

## 2021-09-02 PROCEDURE — 96910 PHOTCHMTX TAR&UVB/PTRLTM&UVB: CPT

## 2021-09-02 NOTE — LETTER BODY
[FreeTextEntry1] : Dr. Lance Andres MD\par [address] [facility]\[place] [address]\par [phone]\par \par Dear Dr. Andres, \par \par Reason for Visit: BPH. Balanitis. Right lank pain. \par \par This is a 81 year-old gentleman with chronic BPH, balanitis, and back pain. The patient underwent an unremarkable renal ultrasound in August 2019. His urodynamics testing and cystoscopy showed incomplete bladder emptying and a bladder outlet obstruction. His cystoscopy showed bilobar hypertrophy. The patient returns today for follow-up. Since he was last seen, the patient underwent a negative CT scan. He has been taking Flomax and Myrbetriq 50 mg without any side effects or difficulties. He reports stable urinary symptoms on medical therapy.  He denies any hematuria, dysuria, or urinary incontinence. He is overall well. Patient denies any changes in health. All other review of systems are negative. Past medical history, family history and social history were unchanged. Medications and allergies were reviewed. He has no known allergies to medication. \par \par On examination, the patient is an older-appearing gentleman in no acute distress. He is alert and oriented follows commands. He has normal mood and affect. He is normocephalic. Neck is supple. Oral no thrush Respirations are unlabored. His abdomen is soft and nontender. Bladder is nonpalpable. No CVA tenderness. Neurologically he is grossly intact. No peripheral edema. Balanitis is present. His phimosis is improving. He has normal male external genitalia. Normal meatus. Bilateral testes are descended intrascrotally and normal to palpation. On rectal examination, there is normal sphincter tone. The prostate is clinically benign without focal induration or nodularity. \par \par I personally reviewed CT images with the patient today and images demonstrated no calculi or hydronephrosis. \par \par ASSESSMENT: BPH, stable symptoms. Balanitis. Right Flank pain. \par \par I counseled the patient. In terms of his BPH, he notes stable urinary symptoms on medical therapy. I recommended he continue Flomax and Myrbetriq 50 mg. I encouraged the patient to continue medications regularly as directed. in terms of his right flank pain, his CT scan was negative for any stones or hydronephrosis. I reassured the patient. Patient understands that if he develops gross hematuria or any urinary discomfort, he will contact me for further evaluation. The patient will follow-up as directed and will contact me with any questions or concerns. Thank you for the opportunity to participate in the care of Mr. PARNELL. I will keep you updated on his progress.\par \par Plan: Continue Myrbetriq 50 mg and Flomax. Followup in 3 months.

## 2021-09-02 NOTE — HISTORY OF PRESENT ILLNESS
[FreeTextEntry1] : Please refer to URO Consult note \par \par fua bph - FLOMAX\par fua frequency - myrbetriq\par \par stable symptoms\par \par ct no stone\par \par Followup in 3 months.

## 2021-09-07 ENCOUNTER — APPOINTMENT (OUTPATIENT)
Dept: DERMATOLOGY | Facility: CLINIC | Age: 81
End: 2021-09-07
Payer: MEDICARE

## 2021-09-07 PROCEDURE — 99213 OFFICE O/P EST LOW 20 MIN: CPT | Mod: 25

## 2021-09-07 PROCEDURE — 96910 PHOTCHMTX TAR&UVB/PTRLTM&UVB: CPT

## 2021-09-07 PROCEDURE — 11901 INJECT SKIN LESIONS >7: CPT

## 2021-09-09 ENCOUNTER — APPOINTMENT (OUTPATIENT)
Dept: DERMATOLOGY | Facility: CLINIC | Age: 81
End: 2021-09-09
Payer: MEDICARE

## 2021-09-09 PROCEDURE — 96910 PHOTCHMTX TAR&UVB/PTRLTM&UVB: CPT

## 2021-09-14 ENCOUNTER — APPOINTMENT (OUTPATIENT)
Dept: DERMATOLOGY | Facility: CLINIC | Age: 81
End: 2021-09-14
Payer: MEDICARE

## 2021-09-14 PROCEDURE — 96910 PHOTCHMTX TAR&UVB/PTRLTM&UVB: CPT

## 2021-09-16 ENCOUNTER — APPOINTMENT (OUTPATIENT)
Dept: DERMATOLOGY | Facility: CLINIC | Age: 81
End: 2021-09-16
Payer: MEDICARE

## 2021-09-16 PROCEDURE — 96910 PHOTCHMTX TAR&UVB/PTRLTM&UVB: CPT

## 2021-09-21 ENCOUNTER — APPOINTMENT (OUTPATIENT)
Dept: DERMATOLOGY | Facility: CLINIC | Age: 81
End: 2021-09-21
Payer: MEDICARE

## 2021-09-21 ENCOUNTER — EMERGENCY (EMERGENCY)
Facility: HOSPITAL | Age: 81
LOS: 1 days | Discharge: HOME | End: 2021-09-21
Attending: EMERGENCY MEDICINE | Admitting: EMERGENCY MEDICINE
Payer: MEDICARE

## 2021-09-21 VITALS
HEART RATE: 66 BPM | RESPIRATION RATE: 18 BRPM | DIASTOLIC BLOOD PRESSURE: 88 MMHG | SYSTOLIC BLOOD PRESSURE: 149 MMHG | OXYGEN SATURATION: 100 % | TEMPERATURE: 98 F

## 2021-09-21 VITALS
HEIGHT: 70 IN | HEART RATE: 74 BPM | SYSTOLIC BLOOD PRESSURE: 151 MMHG | DIASTOLIC BLOOD PRESSURE: 82 MMHG | RESPIRATION RATE: 16 BRPM | TEMPERATURE: 98 F | OXYGEN SATURATION: 100 %

## 2021-09-21 PROCEDURE — 73030 X-RAY EXAM OF SHOULDER: CPT | Mod: 26,LT

## 2021-09-21 PROCEDURE — 73564 X-RAY EXAM KNEE 4 OR MORE: CPT | Mod: 26,LT

## 2021-09-21 PROCEDURE — 96910 PHOTCHMTX TAR&UVB/PTRLTM&UVB: CPT

## 2021-09-21 PROCEDURE — 99284 EMERGENCY DEPT VISIT MOD MDM: CPT | Mod: GC

## 2021-09-21 RX ORDER — ACETAMINOPHEN 500 MG
975 TABLET ORAL ONCE
Refills: 0 | Status: COMPLETED | OUTPATIENT
Start: 2021-09-21 | End: 2021-09-21

## 2021-09-21 RX ADMIN — Medication 975 MILLIGRAM(S): at 09:40

## 2021-09-21 NOTE — ED PROVIDER NOTE - OBJECTIVE STATEMENT
Patient is an 80 yo Male with PMHx of OA, HTN, HLD, and Asthma presenting for L shoulder and L knee pain. Onset 3 wks ago while attempting to stop sliding furniture. Suffered trauma to L knee from furniture. Pain is sharp stabbing in anterior L shoulder, L trapezius, and inferior L patella region. Tried Odex (pain relief ointment) and warm water which provides temporary relief. Seen PCP Aliza Farrell whom suggested ED visit with XR to Shoulder/Knee. Pain exacerbated with ROM and relieved with rest. Patient is an 82 yo Male with PMHx of OA, HTN, HLD, and Asthma presenting for L shoulder and L knee pain. Onset 3 wks ago while attempting to stop sliding furniture. Suffered trauma to L knee from furniture. Pain is sharp stabbing in anterior L shoulder, L trapezius, and inferior L patella region. Tried Odex (pain relief ointment) and warm water which provides temporary relief. Seen PCP Aliza Farrell whom suggested ED visit with XR to Shoulder/Knee. Pain exacerbated with ROM and relieved with rest. Denies fever, CP, SOB (asthma related), ab pain, numbness, tingling.

## 2021-09-21 NOTE — ED PROVIDER NOTE - PROGRESS NOTE DETAILS
Ron MAX (PGY-2)   pt's pain improved, currently 3/10. able to range shoulder with minimal pain. ambulating w/o difficulty. will d/c to home with ortho f/u (info given to discharge lounge). given return precautions Ron MAX (PGY-2)   pt's pain improved, currently 3/10. able to range shoulder with minimal pain. ambulating w/o difficulty. will d/c to home with ortho f/u (info given to discharge lounge). given return precautions. pt came here by bus and wants to leave by bus as well

## 2021-09-21 NOTE — ED PROVIDER NOTE - ATTENDING CONTRIBUTION TO CARE
pt w/ left shoulder pain after moving furniture, pain relieved w/ tylenol then reoccurs, will xray r/o fx, likely sprain vs AC separation vs rotator cuff tear, will send w/ ortho f/u.  GEN - NAD; well appearing; A+O x3   HEAD - NC/AT     EYES - EOMI, no conjunctival pallor, no scleral icterus  ENT -   mucous membranes  moist , no discharge      NECK - Neck supple  PULM - CTA b/l,  symmetric breath sounds  COR -  RRR, S1 S2, no murmurs  ABD - , ND, NT, soft, no guarding, no rebound, no masses    BACK - no CVA tenderness, nontender spine     EXTREMS - fROm left shoulder, no point tenderness/swelling/erythema, pain at 90 degrees.   SKIN - no rash or bruising      NEUROLOGIC - alert, sensation nl, motor 5/5 RUE/LUE/RLE/LLE

## 2021-09-21 NOTE — ED PROVIDER NOTE - PATIENT PORTAL LINK FT
You can access the FollowMyHealth Patient Portal offered by [facility] by registering at the following website: [email or web address]. By joining [employer]’s FollowMyHealth portal, you will also be able to view your health information using other applications (apps) compatible with our system.

## 2021-09-21 NOTE — ED PROVIDER NOTE - CARE PLAN
Principal Discharge DX:	Nontraumatic shoulder pain, left  Secondary Diagnosis:	Acute pain of left knee   1

## 2021-09-21 NOTE — ED PROVIDER NOTE - CLINICAL SUMMARY MEDICAL DECISION MAKING FREE TEXT BOX
Patient is an 82 yo Male with PMHx of OA, HTN, HLD, and Asthma presenting for L shoulder and L knee pain 2/2 to trauma. Worsening pain over past 3 wks and referred by PCP for XR of L Shoulder/Knee. Stable patient with no ligament instability on physical. Ordered XR of L shoulder/knee and gave Tylenol 650mg. Likely L shoulder/knee contusion and less likely Shoulder dislocation, Rotator cuff tear, Fx, Patellar dislocation, septic joint.  Will reassess pt. Discharge home on Tylenol with PCP follow up if patient is stable after XR review. Patient is an 82 yo Male with PMHx of OA, HTN, HLD, and Asthma presenting for L shoulder and L knee pain 2/2 to trauma. Worsening pain over past 3 wks and referred by PCP for XR of L Shoulder/Knee. Stable patient with good ROM and no ligament instability on physical. Ordered XR of L shoulder/knee and gave Tylenol 650mg. Likely L shoulder/knee contusion and less likely Shoulder dislocation, Rotator cuff tear, Fx, Patellar dislocation, septic joint.  Will reassess pt. Discharge home on Tylenol with PCP follow up if patient is stable after XR review.

## 2021-09-21 NOTE — ED PROVIDER NOTE - MUSCULOSKELETAL MINIMAL EXAM
Anterior L shoulder and L trapezius pain with abduction/normal range of motion/no muscle tenderness/motor intact

## 2021-09-21 NOTE — ED ADULT TRIAGE NOTE - CHIEF COMPLAINT QUOTE
Pt c/o pain to Lt shoulder and Lt knee for the past 2-3 weeks after pushing furniture. Pt states pain is intermittent, worsens upon movement. Pt ambulatory.

## 2021-09-21 NOTE — ED PROVIDER NOTE - NSFOLLOWUPCLINICS_GEN_ALL_ED_FT
[facility]  Orthopedic Surgery  [address] [address]  Phone: [phone]  Fax:   Follow Up Time: Urgent

## 2021-09-21 NOTE — ED PROVIDER NOTE - NSICDXPASTMEDICALHX_GEN_ALL_CORE_FT
PAST MEDICAL HISTORY:  CVA (cerebral infarction)     Diabetes     High cholesterol     Hypertension

## 2021-09-21 NOTE — ED PROVIDER NOTE - NSFOLLOWUPINSTRUCTIONS_ED_ALL_ED_FT
Please take over the counter pain medications such as Tylenol (650mg every 4 hours) for pain and follow up with both your primary care doctor and the Orthopedic Surgery clinic within the next 48-72 hours for further evaluation of your symptoms    Return to the Emergency Department if you have any new or worsening symptoms    See the document labeled "Shoulder Pain" for more information on your diagnosis, as well as symptoms to look out for to return to the ED Please take over the counter pain medications such as Tylenol (650mg every 4 hours) for pain, and follow up with both your primary care doctor and the Orthopedic Surgery clinic within the next 48-72 hours for further evaluation of your symptoms    Return to the Emergency Department if you have any new or worsening symptoms    See the document labeled "Shoulder Pain" for more information on your diagnosis, as well as symptoms to look out for to return to the ED

## 2021-09-23 ENCOUNTER — APPOINTMENT (OUTPATIENT)
Dept: DERMATOLOGY | Facility: CLINIC | Age: 81
End: 2021-09-23
Payer: MEDICARE

## 2021-09-23 PROCEDURE — 96910 PHOTCHMTX TAR&UVB/PTRLTM&UVB: CPT

## 2021-09-28 ENCOUNTER — APPOINTMENT (OUTPATIENT)
Dept: DERMATOLOGY | Facility: CLINIC | Age: 81
End: 2021-09-28

## 2021-10-05 ENCOUNTER — APPOINTMENT (OUTPATIENT)
Dept: DERMATOLOGY | Facility: CLINIC | Age: 81
End: 2021-10-05
Payer: MEDICARE

## 2021-10-05 PROCEDURE — 96910 PHOTCHMTX TAR&UVB/PTRLTM&UVB: CPT

## 2021-10-19 ENCOUNTER — APPOINTMENT (OUTPATIENT)
Dept: DERMATOLOGY | Facility: CLINIC | Age: 81
End: 2021-10-19
Payer: MEDICARE

## 2021-10-19 PROCEDURE — 11900 INJECT SKIN LESIONS </W 7: CPT

## 2021-10-19 PROCEDURE — 99213 OFFICE O/P EST LOW 20 MIN: CPT | Mod: 25

## 2021-10-26 ENCOUNTER — APPOINTMENT (OUTPATIENT)
Dept: DERMATOLOGY | Facility: CLINIC | Age: 81
End: 2021-10-26
Payer: MEDICARE

## 2021-10-26 PROCEDURE — 99213 OFFICE O/P EST LOW 20 MIN: CPT | Mod: 25

## 2021-10-26 PROCEDURE — 11900 INJECT SKIN LESIONS </W 7: CPT

## 2021-11-15 ENCOUNTER — APPOINTMENT (OUTPATIENT)
Dept: UROLOGY | Facility: CLINIC | Age: 81
End: 2021-11-15
Payer: MEDICARE

## 2021-11-15 PROCEDURE — 99214 OFFICE O/P EST MOD 30 MIN: CPT

## 2021-11-15 NOTE — LETTER BODY
[FreeTextEntry1] : Dr. Lance Andres MD\par [address] [facility]\[place] [address]\par [phone]\par \par Dear Dr. Andres, \par \par Reason for Visit: BPH. Balanitis. Right flank pain. \par \par This is a 81 year-old gentleman with chronic BPH, balanitis, and back pain. The patient underwent an unremarkable renal ultrasound in August 2019. His urodynamics testing and cystoscopy showed incomplete bladder emptying and a bladder outlet obstruction. His cystoscopy showed bilobar hypertrophy. His CT of abdomen and pelvis in August was negative. The patient returns today for follow-up. Since he was last seen, the patient has been taking Flomax and Myrbetriq 50 mg without any side effects or difficulties. He reports stable urinary symptoms on medical therapy. He denies any hematuria, dysuria, or urinary incontinence. He is overall well. Patient denies any changes in health. All other review of systems are negative. Past medical history, family history and social history were unchanged. Medications and allergies were reviewed. He has no known allergies to medication. \par \par On examination, the patient is an older-appearing gentleman in no acute distress. He is alert and oriented follows commands. He has normal mood and affect. He is normocephalic. Neck is supple. Oral no thrush Respirations are unlabored. His abdomen is soft and nontender. Bladder is nonpalpable. No CVA tenderness. Neurologically he is grossly intact. No peripheral edema. Balanitis is present. His phimosis is improving. He has normal male external genitalia. Normal meatus. Bilateral testes are descended intrascrotally and normal to palpation. On rectal examination, there is normal sphincter tone. The prostate is clinically benign without focal induration or nodularity. \par \par ASSESSMENT: BPH, stable symptoms. Balanitis. \par \par I counseled the patient. In terms of his BPH, the patient notes stable urinary symptoms on medical therapy. I recommended he continue Flomax. In terms of his urinary frequency, his frequency has improved with Myrbetriq 50 mg. I renewed the patient's prescription for Flomax and Myrbetriq today. I encouraged the patient to continue medications regularly as directed.  Patient understands that if he develops gross hematuria or any urinary discomfort, he will contact me for further evaluation. The patient will follow-up as directed and will contact me with any questions or concerns. Thank you for the opportunity to participate in the care of Mr. PARNELL. I will keep you updated on his progress.\par \par Plan: Continue Myrbetriq 50 mg and Flomax. Followup in 6 months. \par

## 2021-11-15 NOTE — HISTORY OF PRESENT ILLNESS
[FreeTextEntry1] : Please refer to URO Consult note \par \par FUA BPH \par stable symptoms on Flomax and Myrbetriq \par follow up in 6 months

## 2021-11-22 NOTE — ED PROVIDER NOTE - PMH
SW mailed letter to pt today due to no response to recent VM or portal message. SW welcomed pt to make contact if she needs any support or resources. MD updated for pt's upcoming 11/24 visit.    CVA (cerebral infarction)    Diabetes    High cholesterol    Hypertension

## 2021-11-30 ENCOUNTER — APPOINTMENT (OUTPATIENT)
Dept: DERMATOLOGY | Facility: CLINIC | Age: 81
End: 2021-11-30
Payer: MEDICARE

## 2021-11-30 PROCEDURE — 11900 INJECT SKIN LESIONS </W 7: CPT

## 2021-11-30 PROCEDURE — 99213 OFFICE O/P EST LOW 20 MIN: CPT | Mod: 25

## 2021-12-01 PROCEDURE — G9005: CPT

## 2021-12-03 NOTE — HISTORY OF PRESENT ILLNESS
[FreeTextEntry1] : Please refer to URO Consult note \par \par increase Myrbetriq to 50 mg  \par cat scan for flank pain Alert and oriented to person, place, time/situation. normal mood and affect. no apparent risk to self or others.

## 2021-12-06 ENCOUNTER — APPOINTMENT (OUTPATIENT)
Dept: UROLOGY | Facility: CLINIC | Age: 81
End: 2021-12-06
Payer: MEDICARE

## 2021-12-06 PROCEDURE — 99213 OFFICE O/P EST LOW 20 MIN: CPT

## 2021-12-06 RX ORDER — TAMSULOSIN HYDROCHLORIDE 0.4 MG/1
0.4 CAPSULE ORAL
Qty: 90 | Refills: 3 | Status: COMPLETED | COMMUNITY
Start: 2019-07-18 | End: 2021-12-06

## 2021-12-06 NOTE — LETTER BODY
[FreeTextEntry1] : Dr. Lance Andres MD\par [address] [facility]\[place] [address]\par [phone]\par \par Dear Dr. Andres, \par \par Reason for Visit: BPH. Balanitis.\par \par This is a 81 year-old gentleman with chronic BPH, balanitis, and back pain. The patient underwent an unremarkable renal ultrasound in August 2019. His urodynamics testing and cystoscopy showed incomplete bladder emptying and a bladder outlet obstruction. His cystoscopy showed bilobar hypertrophy. His CT of abdomen and pelvis in August was negative. The patient returns today for follow-up. Since he was last seen, the patient has discontinued Flomax. He reports taking Myrbetriq 50 mg without any side effects or difficulties. He reports stable urinary symptoms with Myrbetriq.  He denies any hematuria, dysuria, or urinary incontinence. He is overall well. Patient denies any changes in health. All other review of systems are negative. Past medical history, family history and social history were unchanged. Medications and allergies were reviewed. He has no known allergies to medication. \par \par On examination, the patient is an older-appearing gentleman in no acute distress. He is alert and oriented follows commands. He has normal mood and affect. He is normocephalic. Neck is supple. Oral no thrush Respirations are unlabored. His abdomen is soft and nontender. Bladder is nonpalpable. No CVA tenderness. Neurologically he is grossly intact. No peripheral edema. Balanitis is present. His phimosis is improving. He has normal male external genitalia. Normal meatus. Bilateral testes are descended intrascrotally and normal to palpation. On rectal examination, there is normal sphincter tone. The prostate is clinically benign without focal induration or nodularity. \par \par ASSESSMENT: BPH, stable symptoms. Balanitis. \par \par I counseled the patient. He is doing well. In terms of his BPH, the patient notes stable urinary symptoms with medical therapy. I recommended the patient continue taking Myrbetriq 50 mg. I renewed the patient's prescription for Myrbetriq today. I encouraged the patient to continue medications regularly as directed. Patient understands that if he develops gross hematuria or any urinary discomfort, he will contact me for further evaluation. The patient will follow-up as directed and will contact me with any questions or concerns. Thank you for the opportunity to participate in the care of Mr. PARNELL. I will keep you updated on his progress.\par \par Plan: Continue Myrbetriq 50 mg. Follow up in 1 year.

## 2021-12-06 NOTE — HISTORY OF PRESENT ILLNESS
[FreeTextEntry1] : Please refer to URO Consult note \par \par FUA BPH \par on Myrbetriq 50 mg\par symptoms improved \par stopped Flomax \par come back in 1 year

## 2021-12-14 ENCOUNTER — APPOINTMENT (OUTPATIENT)
Dept: DERMATOLOGY | Facility: CLINIC | Age: 81
End: 2021-12-14
Payer: MEDICARE

## 2021-12-14 PROCEDURE — 11900 INJECT SKIN LESIONS </W 7: CPT

## 2021-12-14 PROCEDURE — 99213 OFFICE O/P EST LOW 20 MIN: CPT | Mod: 25

## 2021-12-30 NOTE — ED ADULT TRIAGE NOTE - NS ED TRIAGE AVPU SCALE
Home Oxygen Qualifying Test       Patient name: Galilea Miller        : 1947   Date of Test:  2021  Diagnosis:      Home Oxygen Test:    **Medicare Guidelines require item(s) 1-5 on all ambulatory patients or 1 and 2 on non-ambulatory patients  1   Baseline SPO2 on Room Air at rest 95  %  2   SPO2 during exercise on Room Air 89 - 91 %  During exercise monitor SpO2  If SPO2 increases >=89% with ambulation do not add supplemental             oxygen  If <= 88% on room air add O2 via NC and titrate patient  Patient must be ambulated with O2 and titrated to > 88% with exertion  No Oxygen applied    3  SPO2 on Oxygen at rest %  lpm     4   SPO2 during exercise on Oxygen  % a liter flow of  lpm     5   Exercise performed:          walking, duration 10 (min), distance 24 (feet)          []  Supplemental Home Oxygen is indicated  [x]  Client does not qualify for home oxygen  Respiratory Additional Notes- Patient was ambulated with   A walk in his room with his RN Present  Pt was a little   unsteady on his feet   Pt tolerated walk well/     Iban Rojas, RT Alert-The patient is alert, awake and responds to voice. The patient is oriented to time, place, and person. The triage nurse is able to obtain subjective information.

## 2022-01-01 NOTE — ED PROVIDER NOTE - CROS ED RESP ALL NEG
- General Info


Date of Service: 01/10/22





- Patient Data


Vital Signs: 


                                Last Vital Signs











Temp  36.6 C   01/10/22 08:00


 


Pulse  142   01/10/22 08:00


 


Resp  58   01/10/22 08:00


 


BP  79/45   22 16:20


 


Pulse Ox  99   22 20:00











Weight: 3.84 kg


Labs Last 24 Hours: 


                         Laboratory Results - last 24 hr











  22 Range/Units





  14:25 


 


Cord Blood Type  AB POSITIVE  











Current Medications: 


                               Current Medications





Dextrose (Glucose Gel 15 Gm In 37.5 Gm Tube)  0 gm PO ONETIME PRN; Protocol


   PRN Reason: Hypoglycemia


Erythromycin (Erythromycin Base 0.5% Ophth Oint 1 Gm Tube)  1 gm EYEBOTH ONETIME

PRN


   PRN Reason: For Delivery


   Last Admin: 22 16:20 Dose:  1 tube


   Documented by: 


Lidocaine HCl (Lidocaine 1% Pf 2 Ml Sdv)  0 ml INJECT ONETIME PRN


   PRN Reason: Circumcision


Neomycin/Polymyxin/Bacitracin (Bacitracin/Neomycin/Polymyxin B Oint 28.4 Gm 

Tube)  0 gm TOP ASDIRECTED PRN


   PRN Reason: circumcision


Sucrose (Sucrose 24% Solution 15 Ml Vial)  15 ml PO ASDIRECTED PRN


   PRN Reason: Circumcision





Discontinued Medications





Hepatitis B Vaccine (Hepatitis B Virus Vaccine Pf (Pediatric) 10 Mcg/0.5 Ml 

Syringe)  10 mcg IM .ONCE ONE


   Stop: 22 16:05


   Last Admin: 22 16:20 Dose:  10 mcg


   Documented by: 


Phytonadione (Phytonadione 1 Mg/0.5 Ml Syringe)  1 mg IM ONETIME ONE


   Stop: 22 16:05


   Last Admin: 22 16:20 Dose:  1 mg


   Documented by: 











- General/Neuro


Activity: Active





- Exam


Eyes: Bilateral: Normal Inspection, Red Reflex, Positive


Ears: Normal Appearance, Symmetrical


Nose: Normal Inspection, Normal Mucosa


Mouth: Nnormal Inspection, Palate Intact


Chest/Cardiovascular: Normal Appearance, Normal Peripheral Pulses, Regular Heart

Rate, Symmetrical


Respiratory: Lungs Clear, Normal Breath Sounds, No Respiratoy Distress


Abdomen/GI: Normal Bowel Sounds, No Mass, Symmetrical, Soft


Extremities: Normal Inspection, Normal Capillary Refill, Normal Range of Motion


Skin: Dry, Intact, Normal Color, Warm, Other (nevus flammeus around eyes L>R; 

trace jaundice)





- Subjective


Note: 





Baby chelsi Ibanez is doing well but not breast feeding well. Will meet with 

lactation nurse today. Plan discharge tomorrow because of mother's history of 

anxiety and depression and difficulty with breast feeding.  Infant is voiding 

and stooling and VS are normal and stable.


Bilirubin is 3.3mg% low risk.





- Problem List & Annotations


(1) Liveborn infant by vaginal delivery


SNOMED Code(s): 736842912, 843720056


   Code(s): Z38.00 - SINGLE LIVEBORN INFANT, DELIVERED VAGINALLY   Status: Acute

  Current Visit: Yes   





- Problem List Review


Problem List Initiated/Reviewed/Updated: Yes





- My Orders


Last 24 Hours: 


My Active Orders





01/10/22 09:22


BILIRUBIN,  PROFILE [CHEM] Routine 














- Plan


Plan:: 





Anticipate Normal  care for 24 to 48 hours.
negative...

## 2022-03-22 ENCOUNTER — APPOINTMENT (OUTPATIENT)
Dept: DERMATOLOGY | Facility: CLINIC | Age: 82
End: 2022-03-22

## 2022-03-24 ENCOUNTER — APPOINTMENT (OUTPATIENT)
Dept: UROLOGY | Facility: CLINIC | Age: 82
End: 2022-03-24

## 2022-03-30 NOTE — ED ADULT NURSE NOTE - TEMPLATE
Plan: Cont elidel.  Lb discussed. Next refill send to mail order.
Detail Level: Zone
Render In Strict Bullet Format?: No
 Symptoms

## 2022-03-31 ENCOUNTER — APPOINTMENT (OUTPATIENT)
Dept: UROLOGY | Facility: CLINIC | Age: 82
End: 2022-03-31
Payer: MEDICARE

## 2022-03-31 DIAGNOSIS — J98.4 EMPHYSEMA, UNSPECIFIED: ICD-10-CM

## 2022-03-31 DIAGNOSIS — Z71.89 OTHER SPECIFIED COUNSELING: ICD-10-CM

## 2022-03-31 DIAGNOSIS — J43.9 EMPHYSEMA, UNSPECIFIED: ICD-10-CM

## 2022-03-31 DIAGNOSIS — J44.9 CHRONIC OBSTRUCTIVE PULMONARY DISEASE, UNSPECIFIED: ICD-10-CM

## 2022-03-31 PROCEDURE — 99214 OFFICE O/P EST MOD 30 MIN: CPT

## 2022-03-31 NOTE — LETTER BODY
[FreeTextEntry1] : Dr. Lance Andres MD\par [address] [facility]\[place] [address]\par [phone]\par \par Dear Dr. Andres, \par \par Reason for Visit: BPH. Balanitis.\par \par This is a 81 year-old gentleman with chronic BPH, balanitis, and back pain. His renal ultrasound in August 2019 was unremarkable.  His urodynamics testing and cystoscopy in June 2021 showed incomplete bladder emptying and a bladder outlet obstruction. His CT of abdomen and pelvis in August 2021 was negative. The patient returns today for follow-up. Since he was last seen, the patient reports taking Flomax and Myrbetriq 50 mg regularly without any side effects or difficulties. He reports progressive urinary symptoms despite medical therapy. He denies any hematuria, dysuria, or urinary incontinence. He is overall well. Patient denies any changes in health. All other review of systems are negative. Past medical history, family history and social history were unchanged. Medications and allergies were reviewed. He has no known allergies to medication. \par \par On examination, the patient is an older-appearing gentleman in no acute distress. He is alert and oriented follows commands. He has normal mood and affect. He is normocephalic. Neck is supple. Oral no thrush Respirations are unlabored. His abdomen is soft and nontender. Bladder is nonpalpable. No CVA tenderness. Neurologically he is grossly intact. No peripheral edema. Balanitis is present. His phimosis is improving. He has normal male external genitalia. Normal meatus. Bilateral testes are descended intrascrotally and normal to palpation. On rectal examination, there is normal sphincter tone. The prostate is clinically benign without focal induration or nodularity. \par \par His BMP in April 2021 demonstrated decreased renal functions, creatinine 1.45. His PSA was 3.99, which is within normal limits.\par \par Post-void residual on bladder scan today was 0 cc. \par \par ASSESSMENT: BPH, progressive symptoms. Balanitis. \par \par I counseled the patient. His PVR today was 0 cc. In terms of his BPH, the patient reports progressive urinary symptoms with medical therapy. I recommended the patient consider undergoing urodynamics testing and cystoscopy to evaluate the urinary outlet. I counseled the patient regarding the procedure. The risks and benefits were discussed. Alternatives were given. I answered the patient questions. The patient will take the necessary preparations for the procedure. I encouraged the patient to continue taking Flomax and Myrbetriq 50 mg regularly as directed. The patient will follow-up as directed and will contact me with any questions or concerns. Thank you for the opportunity to participate in the care of Mr. PARNELL. I will keep you updated on his progress. \par \par Plan: Urodynamics testing and cystoscopy.  Continue Flomax and Myrbetriq 50 mg. Follow up as directed.

## 2022-06-06 ENCOUNTER — OUTPATIENT (OUTPATIENT)
Dept: OUTPATIENT SERVICES | Facility: HOSPITAL | Age: 82
LOS: 1 days | End: 2022-06-06
Payer: MEDICARE

## 2022-06-06 ENCOUNTER — APPOINTMENT (OUTPATIENT)
Dept: UROLOGY | Facility: CLINIC | Age: 82
End: 2022-06-06
Payer: MEDICARE

## 2022-06-06 VITALS
SYSTOLIC BLOOD PRESSURE: 172 MMHG | HEART RATE: 68 BPM | OXYGEN SATURATION: 98 % | DIASTOLIC BLOOD PRESSURE: 66 MMHG | RESPIRATION RATE: 18 BRPM | TEMPERATURE: 98.5 F

## 2022-06-06 DIAGNOSIS — R35.0 FREQUENCY OF MICTURITION: ICD-10-CM

## 2022-06-06 DIAGNOSIS — N40.1 BENIGN PROSTATIC HYPERPLASIA WITH LOWER URINARY TRACT SYMPTOMS: ICD-10-CM

## 2022-06-06 PROCEDURE — 51797 INTRAABDOMINAL PRESSURE TEST: CPT | Mod: 26

## 2022-06-06 PROCEDURE — 51797 INTRAABDOMINAL PRESSURE TEST: CPT

## 2022-06-06 PROCEDURE — 51728 CYSTOMETROGRAM W/VP: CPT

## 2022-06-06 PROCEDURE — 52000 CYSTOURETHROSCOPY: CPT

## 2022-06-06 PROCEDURE — 51784 ANAL/URINARY MUSCLE STUDY: CPT | Mod: 26

## 2022-06-06 PROCEDURE — 99213 OFFICE O/P EST LOW 20 MIN: CPT | Mod: 25

## 2022-06-06 PROCEDURE — 51741 ELECTRO-UROFLOWMETRY FIRST: CPT | Mod: 26

## 2022-06-06 PROCEDURE — 51784 ANAL/URINARY MUSCLE STUDY: CPT

## 2022-06-06 PROCEDURE — 51741 ELECTRO-UROFLOWMETRY FIRST: CPT

## 2022-06-06 PROCEDURE — 51728 CYSTOMETROGRAM W/VP: CPT | Mod: 26

## 2022-06-25 NOTE — LETTER BODY
[FreeTextEntry1] : Bethany El MD\par [address] [address] [place] [address], [address], \[place] [address]\par [phone]\par \par Dear Dr. El,\par \par Reason for Visit: BPH. Balanitis.\par \par This is a 82 year-old gentleman with chronic BPH, balanitis, and back pain. His renal ultrasound in August 2019 was unremarkable. His urodynamics testing and cystoscopy in June 2021 showed incomplete bladder emptying and a bladder outlet obstruction. His CT of abdomen and pelvis in August 2021 was unremarkable. The patient returns today for urodynamics testing and cystoscopy. Since he was last seen, the patient reports taking Flomax QD and Myrbetriq 50 mg regularly without any side effects or difficulties. He reports progressive urinary symptoms despite medical therapy. He denies any hematuria, dysuria, or urinary incontinence. The patient denies any changes in health. All other review of systems are negative. Past medical history, family history and social history were unchanged. Medications and allergies were reviewed. He has no known allergies to medication. \par \par On examination, the patient is an older-appearing gentleman in no acute distress. He is alert and oriented follows commands. He has normal mood and affect. He is normocephalic. Neck is supple. Oral no thrush Respirations are unlabored. His abdomen is soft and nontender. Bladder is nonpalpable. No CVA tenderness. Neurologically he is grossly intact. No peripheral edema. Balanitis is present. His phimosis is improving. He has normal male external genitalia. Normal meatus. Bilateral testes are descended intrascrotally and normal to palpation. On rectal examination, there is normal sphincter tone. The prostate is clinically benign without focal induration or nodularity. \par \par His BMP in April 2021 demonstrated decreased renal functions, creatinine 1.45. 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 \par His cystoscopy today demonstrated bilobar hypertrophy obstructing the urinary outlet. \par \par ASSESSMENT: BPH, progressive symptoms. \par \par I counseled the patient.  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 His cystoscopy today demonstrated bilobar hypertrophy obstructing the urinary outlet. I recommended the patient consider undergoing GreenLight laser vaporization of prostate. I counseled the patient regarding the procedure. The patient declines surgery. He wishes to continue medical therapy. I recommended the patient begin a trial of Proscar. I discussed the potential side effects of the medication. I counseled the patient on its use and side effects. If the patient develops any side effects, the patient will discontinue the medication and contact me. I also recommended the patient increase Flomax to BID and continue taking Myrbetriq 50 mg. I renewed the patient's prescription for Flomax and Myrbetriq today. I encouraged the patient to continue medications regularly as directed. Risks and alternatives were discussed. I answered the patient questions. The patient will follow-up as directed and will contact me with any questions or concerns. Thank you for the opportunity to participate in the care of this patient, I will keep you updated on his progress. \par \par Plan: Trial of Proscar. Continue Flomax BID and Myrbetriq 50 mg. Follow up in 6 months.

## 2022-07-21 NOTE — ED PROVIDER NOTE - ENMT, MLM
Airway patent, Nasal mucosa clear. Mouth with normal mucosa. Throat has no vesicles, no oropharyngeal exudates and uvula is midline. Clara

## 2022-08-24 NOTE — ED PROCEDURE NOTE - NS ED PROC PERFORMED BY1 FT
Problem: Plan of Care - These are the overarching goals to be used throughout the patient stay.    Goal: Optimal Comfort and Wellbeing  Outcome: Ongoing, Progressing     A-fib on tele with HR upper 30s to 50s. Pt denies symptoms. Fall precautions in place.    PRIMARY DIAGNOSIS: Atrial Myxoma  OUTPATIENT/OBSERVATION GOALS TO BE MET BEFORE DISCHARGE:  1. ADLs back to baseline: Yes    2. Activity and level of assistance: Up with standby assistance.    3. Pain status: Pain free.    4. Return to near baseline physical activity: Yes     Discharge Planner Nurse   Safe discharge environment identified: No  Barriers to discharge: Yes - Cardiology and Cardiothoracic Surgery to consult.       Entered by: Sheldon Caraballo RN 08/23/2022 10:43 PM     Please review provider order for any additional goals.   Nurse to notify provider when observation goals have been met and patient is ready for discharge.   Munira Hsieh

## 2022-09-08 ENCOUNTER — APPOINTMENT (OUTPATIENT)
Dept: UROLOGY | Facility: CLINIC | Age: 82
End: 2022-09-08

## 2022-09-08 PROCEDURE — 99214 OFFICE O/P EST MOD 30 MIN: CPT

## 2022-09-08 NOTE — ADDENDUM
[FreeTextEntry1] : Entered by YADIRA CASTAÑEDA, acting as scribe for Dr. Nnamdi Carrillo.\par The documentation recorded by the scribe accurately reflects the service I personally performed and the decisions made by me.

## 2022-09-08 NOTE — HISTORY OF PRESENT ILLNESS
[FreeTextEntry1] : Follow-up BPH.  Patient has been taking Flomax twice daily.  He did not start finasteride.  Plan start finasteride.  PSA.\par \par Follow-up urinary frequency.  Continue Myrbetriq.  Overall urinary symptoms improved.  Follow-up 6 months.\par \par Please refer to URO Consult note

## 2022-09-08 NOTE — LETTER BODY
[FreeTextEntry1] : Bethany El MD\par [address] [address] [place] [address], [address], \[place] [address]\par [phone]\par \par Dear Dr. El,\par \par Reason for Visit: BPH. Balanitis.\par \par This is a 82 year-old gentleman with chronic BPH, balanitis, and back pain. His renal ultrasound in August 2019 was unremarkable. His urodynamics testing and cystoscopy in June 2021 showed incomplete bladder emptying and a bladder outlet obstruction. His CT of abdomen and pelvis in August 2021 was unremarkable. 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 His cystoscopy demonstrated bilobar hypertrophy obstructing the urinary outlet. The patient returns today for follow up. Since he was last seen, the patient reports taking Flomax BID and Myrbetriq 50 mg regularly without any side effects or difficulties. He did not start Proscar. He reports persistent urinary symptoms despite medical therapy. He denies any hematuria, dysuria, or urinary incontinence. The patient denies any changes in health. All other review of systems are negative. Past medical history, family history and social history were unchanged. Medications and allergies were reviewed. He has no known allergies to medication. \par \par On examination, the patient is an older-appearing gentleman in no acute distress. He is alert and oriented follows commands. He has normal mood and affect. He is normocephalic. Neck is supple. Oral no thrush Respirations are unlabored. His abdomen is soft and nontender. Bladder is nonpalpable. No CVA tenderness. Neurologically he is grossly intact. No peripheral edema. Balanitis is present. His phimosis is improving. He has normal male external genitalia. Normal meatus. Bilateral testes are descended intrascrotally and normal to palpation. On rectal examination, there is normal sphincter tone. The prostate is clinically benign without focal induration or nodularity. \par \par ASSESSMENT: BPH, progressive symptoms. \par \par I counseled the patient. In terms of his BPH, his urodynamics testing in June 2022 demonstrated detrusor instability. His cystoscopy demonstrated bilobar hypertrophy obstructing the urinary outlet. I recommended the patient consider undergoing GreenLight laser vaporization of prostate. I counseled the patient regarding the procedure. The patient declines surgery. He wishes to continue medical therapy. The patient did not start Proscar. I recommended the patient start a trial of Proscar. I discussed the potential side effects of the medication. I counseled the patient on its use and side effects. If the patient develops any side effects, the patient will discontinue the medication and contact me. I recommended the patient continue Flomax BID. I also recommended he obtain PSA and BMP to ensure stability. In terms of his urinary frequency, he reports improved symptoms. I recommended he continue taking Myrbetriq 50 mg. I renewed the patient's prescription for Flomax and Myrbetriq today. I encouraged the patient to continue medications regularly as directed. Risks and alternatives were discussed. I answered the patient questions. The patient will follow-up as directed and will contact me with any questions or concerns. Thank you for the opportunity to participate in the care of this patient, I will keep you updated on his progress. \par \par Plan: Trial of Proscar. Continue Flomax BID and Myrbetriq 50 mg. PSA. BMP. Follow up in 6 months.

## 2022-10-25 ENCOUNTER — APPOINTMENT (OUTPATIENT)
Dept: DERMATOLOGY | Facility: CLINIC | Age: 82
End: 2022-10-25

## 2022-10-25 PROCEDURE — 99214 OFFICE O/P EST MOD 30 MIN: CPT

## 2022-10-25 NOTE — ED ADULT NURSE NOTE - NSFALLRSKASSESASSIST_ED_ALL_ED
Patient presents to ed via [facility] cc abdomen pain, syncopal episode, and diarrhea. Patient reports onset late afternoon. Patient states 10/10 abdomen pain,  Vomiting and diarrhea. Upon EMS arrival patient had witnessed syncopal episode. Patient comes from home alone.      
no

## 2022-11-07 ENCOUNTER — APPOINTMENT (OUTPATIENT)
Dept: UROLOGY | Facility: CLINIC | Age: 82
End: 2022-11-07

## 2022-11-07 DIAGNOSIS — N48.0 LEUKOPLAKIA OF PENIS: ICD-10-CM

## 2022-11-07 PROCEDURE — 99214 OFFICE O/P EST MOD 30 MIN: CPT

## 2022-11-07 RX ORDER — TROSPIUM CHLORIDE 20 MG/1
20 TABLET, FILM COATED ORAL
Qty: 30 | Refills: 3 | Status: ACTIVE | COMMUNITY
Start: 2022-11-07 | End: 1900-01-01

## 2022-11-07 NOTE — HISTORY OF PRESENT ILLNESS
[FreeTextEntry1] : Follow-up BPH.  Continue Flomax and Proscar.\par Persistent urine frequency.  Continue Myrbetriq.  Add trospium 20 mg.  Follow-up 1 month.\par .\par Follow-up\par \par Please refer to URO Consult note

## 2022-11-07 NOTE — LETTER BODY
[FreeTextEntry1] : Bethany El MD\par [address] [address] [place] [address], [address], \[place] [address]\par [phone]\par \par Dear Dr. El,\par \par Reason for Visit: BPH. Balanitis.\par \par This is a 82 year-old gentleman with chronic BPH, balanitis, and back pain. His renal ultrasound in August 2019 was unremarkable. His urodynamics testing and cystoscopy in June 2021 showed incomplete bladder emptying and a bladder outlet obstruction. His CT of abdomen and pelvis in August 2021 was unremarkable. 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 His cystoscopy demonstrated bilobar hypertrophy obstructing the urinary outlet. The patient returns today for follow up. Since he was last seen, the patient reports taking Flomax BID, Proscar, and Myrbetriq 50 mg regularly without any side effects or difficulties. He reports persistent urinary frequency despite medical therapy. He denies any hematuria, dysuria, or urinary incontinence. The patient denies any changes in health. All other review of systems are negative. Past medical history, family history and social history were unchanged. Medications and allergies were reviewed. He has no known allergies to medication. \par \par On examination, the patient is an older-appearing gentleman in no acute distress. He is alert and oriented follows commands. He has normal mood and affect. He is normocephalic. Neck is supple. Oral no thrush Respirations are unlabored. His abdomen is soft and nontender. Bladder is nonpalpable. No CVA tenderness. Neurologically he is grossly intact. No peripheral edema. Balanitis is present. His phimosis is improving. He has normal male external genitalia. Normal meatus. Bilateral testes are descended intrascrotally and normal to palpation. On rectal examination, there is normal sphincter tone. The prostate is clinically benign without focal induration or nodularity.\par \par Post-void residual on bladder scan today was 140 cc.\par \par ASSESSMENT: BPH, progressive symptoms. \par \par I counseled the patient. In terms of his BPH, his urodynamics testing in June 2022 demonstrated detrusor instability. His cystoscopy demonstrated bilobar hypertrophy obstructing the urinary outlet. I recommended the patient consider undergoing GreenLight laser vaporization of prostate. I counseled the patient regarding the procedure. The patient declines surgery. He wishes to continue medical therapy. I recommended the patient continue Flomax BID and Proscar. I also recommended he obtain PSA and BMP to ensure stability. In terms of his urinary frequency, he reports improved symptoms. I recommended he continue taking Myrbetriq 50 mg. I also recommended he start a trial of Trospium 20 mg. I discussed the potential side effects of the medication. I counseled the patient on its use and side effects. If the patient develops any side effects, the patient will discontinue the medication and contact me. I also recommended he obtain urinalysis and urine culture to evaluate for infection. Risks and alternatives were discussed. I answered the patient questions. The patient will follow-up as directed and will contact me with any questions or concerns. Thank you for the opportunity to participate in the care of this patient, I will keep you updated on his progress. \par \par Plan: Trial of Trospium 20 mg. Continue Flomax BID, Proscar, and Myrbetriq 50 mg. PSA. BMP. Urine culture. Urinalysis. Follow up in 1 month.

## 2022-11-12 LAB
APPEARANCE: CLEAR
BACTERIA UR CULT: NORMAL
BACTERIA: NEGATIVE
BILIRUBIN URINE: NEGATIVE
BLOOD URINE: NEGATIVE
COLOR: NORMAL
GLUCOSE QUALITATIVE U: NEGATIVE
HYALINE CASTS: 0 /LPF
KETONES URINE: NEGATIVE
LEUKOCYTE ESTERASE URINE: NEGATIVE
MICROSCOPIC-UA: NORMAL
NITRITE URINE: NEGATIVE
PH URINE: 6
PROTEIN URINE: ABNORMAL
RED BLOOD CELLS URINE: 5 /HPF
SPECIFIC GRAVITY URINE: >=1.03
SQUAMOUS EPITHELIAL CELLS: 1 /HPF
UROBILINOGEN URINE: NORMAL
WHITE BLOOD CELLS URINE: 2 /HPF

## 2022-11-23 ENCOUNTER — NON-APPOINTMENT (OUTPATIENT)
Age: 82
End: 2022-11-23

## 2022-12-06 ENCOUNTER — RX CHANGE (OUTPATIENT)
Age: 82
End: 2022-12-06

## 2022-12-15 ENCOUNTER — APPOINTMENT (OUTPATIENT)
Dept: UROLOGY | Facility: CLINIC | Age: 82
End: 2022-12-15

## 2022-12-15 LAB
CREAT 24H UR-MCNC: 1.7 G/24 H
CREAT ?TM UR-MCNC: 56 MG/DL
PROT 24H UR-MRATE: 23 MG/DL
PROT ?TM UR-MCNC: 24 HR
PROT UR-MCNC: 690 MG/24 H
SPECIMEN VOL 24H UR: 3000 ML

## 2022-12-15 PROCEDURE — 99214 OFFICE O/P EST MOD 30 MIN: CPT

## 2022-12-15 NOTE — LETTER BODY
[FreeTextEntry1] : Bethany El MD\par [address] [address] [place] [address], [address], \[place] [address]\par [phone]\par \par Dear Dr. El,\par \par Reason for Visit: BPH. Balanitis. Urinary frequency. \par \par This is an 82 year-old gentleman with chronic BPH, balanitis, and back pain. His renal ultrasound in August 2019 was unremarkable. His urodynamics testing and cystoscopy in June 2021 showed incomplete bladder emptying and a bladder outlet obstruction. His CT of abdomen and pelvis in August 2021 was unremarkable. 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 His cystoscopy demonstrated bilobar hypertrophy obstructing the urinary outlet. The patient returns today for follow up. Since he was last seen, the patient reports taking Flomax BID, Proscar, Trospium and Myrbetriq 50 mg regularly without any side effects or difficulties. Patient's balanitis is resolved due to topical cream. He denies any hematuria, dysuria, or urinary incontinence. The patient denies any changes in health. All other review of systems are negative. Past medical history, family history and social history were unchanged. Medications and allergies were reviewed. He has no known allergies to medication. \par \par On examination, the patient is an older-appearing gentleman in no acute distress. He is alert and oriented follows commands. He has normal mood and affect. He is normocephalic. Neck is supple. Oral no thrush Respirations are unlabored. His abdomen is soft and nontender. Bladder is nonpalpable. No CVA tenderness. Neurologically he is grossly intact. No peripheral edema. Balanitis is present. His phimosis is improving. He has normal male external genitalia. Normal meatus. Bilateral testes are descended intrascrotally and normal to palpation. On rectal examination, there is normal sphincter tone. The prostate is clinically benign without focal induration or nodularity.\par \par ASSESSMENT: BPH, progressive symptoms. Balanitis, resolved. Proteinuria. Urinary frequency.\par \par I counseled the patient. In terms of his BPH, I recommended the patient continue Flomax BID and Proscar. In terms of his urinary frequency, I recommended he continue taking Myrbetriq 50 mg and Trospium 20 mg. If the patient develops any side effects, the patient will discontinue the medication and contact me. In terms of his proteinuria I recommend that he see Dr. Pope. Risks and alternatives were discussed. I answered the patient questions. The patient will follow-up as directed and will contact me with any questions or concerns. Thank you for the opportunity to participate in the care of this patient, I will keep you updated on his progress. \par \par Plan: Continue Flomax BID, Proscar, Trospium and Myrbetriq 50 mg. See Dr. Pope. Follow up as directed.

## 2022-12-22 ENCOUNTER — APPOINTMENT (OUTPATIENT)
Dept: NEPHROLOGY | Facility: CLINIC | Age: 82
End: 2022-12-22

## 2022-12-22 ENCOUNTER — LABORATORY RESULT (OUTPATIENT)
Age: 82
End: 2022-12-22

## 2022-12-22 VITALS
DIASTOLIC BLOOD PRESSURE: 70 MMHG | BODY MASS INDEX: 27.5 KG/M2 | OXYGEN SATURATION: 96 % | HEART RATE: 79 BPM | HEIGHT: 72 IN | WEIGHT: 203 LBS | SYSTOLIC BLOOD PRESSURE: 157 MMHG | TEMPERATURE: 97.6 F

## 2022-12-22 VITALS — SYSTOLIC BLOOD PRESSURE: 128 MMHG | DIASTOLIC BLOOD PRESSURE: 68 MMHG

## 2022-12-22 DIAGNOSIS — Z83.3 FAMILY HISTORY OF DIABETES MELLITUS: ICD-10-CM

## 2022-12-22 DIAGNOSIS — R80.9 PROTEINURIA, UNSPECIFIED: ICD-10-CM

## 2022-12-22 PROCEDURE — 99203 OFFICE O/P NEW LOW 30 MIN: CPT

## 2022-12-22 RX ORDER — CAMPHOR AND MENTHOL 5; 5 MG/ML; MG/ML
0.5-0.5 LOTION TOPICAL
Qty: 1 | Refills: 3 | Status: DISCONTINUED | COMMUNITY
Start: 2020-07-14 | End: 2022-12-22

## 2022-12-22 RX ORDER — METHYLPREDNISOLONE 4 MG/1
4 TABLET ORAL
Qty: 21 | Refills: 2 | Status: DISCONTINUED | COMMUNITY
Start: 2019-03-14 | End: 2022-12-22

## 2022-12-22 RX ORDER — DUPILUMAB 300 MG/2ML
300 INJECTION, SOLUTION SUBCUTANEOUS
Qty: 1 | Refills: 0 | Status: DISCONTINUED | COMMUNITY
Start: 2021-03-19 | End: 2022-12-22

## 2022-12-22 RX ORDER — PREDNISONE 10 MG/1
10 TABLET ORAL
Qty: 15 | Refills: 0 | Status: DISCONTINUED | COMMUNITY
Start: 2018-08-24 | End: 2022-12-22

## 2022-12-22 RX ORDER — MONTELUKAST 10 MG/1
10 TABLET, FILM COATED ORAL
Qty: 90 | Refills: 3 | Status: DISCONTINUED | COMMUNITY
Start: 2019-03-14 | End: 2022-12-22

## 2022-12-22 NOTE — CONSULT LETTER
[Dear  ___] : Dear  [unfilled], [Consult Letter:] : I had the pleasure of evaluating your patient, [unfilled]. [Please see my note below.] : Please see my note below. [Sincerely,] : Sincerely, [FreeTextEntry2] : Dr. Edson Armstrong\par [address] [facility], [address] [FreeTextEntry1] : The patient was referred to me by his urologist for proteinuria and renal insufficiency.  Work up in progress.\par This is to introduce myself and facilitate communication in the future.\par The patient and the son are looking for a new internal medicine doctor and will ask you for a referral. [FreeTextEntry3] : Pranav Pope MD\par Office phone: [phone]

## 2022-12-22 NOTE — PHYSICAL EXAM
[General Appearance - Alert] : alert [General Appearance - In No Acute Distress] : in no acute distress [Sclera] : the sclera and conjunctiva were normal [Jugular Venous Distention Increased] : there was no jugular-venous distention [Auscultation Breath Sounds / Voice Sounds] : lungs were clear to auscultation bilaterally [Heart Sounds] : normal S1 and S2 [Murmurs] : no murmurs [Edema] : there was no peripheral edema [Abdomen Tenderness] : non-tender [FreeTextEntry1] : Bladder not distended [No Spinal Tenderness] : no spinal tenderness [Abnormal Walk] : normal gait [] : no rash [No Focal Deficits] : no focal deficits [Oriented To Time, Place, And Person] : oriented to person, place, and time

## 2022-12-22 NOTE — REASON FOR VISIT
[Consultation] : a consultation visit [Family Member] : family member [FreeTextEntry1] : Referred by Dr. Carrillo for proteinuria.

## 2022-12-22 NOTE — HISTORY OF PRESENT ILLNESS
[FreeTextEntry1] : Chart reviewed. Urine showed about 600 mg of proteins.  Last renal panel was in April of 2021 and showed a K of 6.0 and a creatinine of 1.4.  The patient is aware about his potassium issue but was never told about renal insufficiency.  The patient has hypertension and is on Amlodipine/Benazepril 10/20 and Clonidine 01 mg twice a day.  He is also on several other meds for LUTS including Myrbetriq, Tamsulosin and Finasteride. The states that his urination is now much better, his nocturia has improved and has no incontinence. \par The purpose of this visit is to address the cause of the proteinuria and to assess the patient current level of renal function and potassium.

## 2022-12-22 NOTE — ASSESSMENT
[FreeTextEntry1] : 1. Undefined renal insufficiency w/ hyperkalemia (form labs done in April 2021). No more recent labs.  May have been related to BPH. Will do labs today.  \par 2. Proteinuria: non nephrotic.  Will do urine protein to creatinine ratio and screen for paraprotein associated renal disease.\par 3 BPH w LUTS: well controlled on current regimen.\par Will call the patient after the results are available for my review and will discuss follow and work up at that point.

## 2022-12-27 LAB
ALBUMIN MFR SERPL ELPH: 61 %
ALBUMIN SERPL ELPH-MCNC: 4.5 G/DL
ALBUMIN SERPL-MCNC: 4.1 G/DL
ALBUMIN/GLOB SERPL: 1.6 RATIO
ALP BLD-CCNC: 82 U/L
ALPHA1 GLOB MFR SERPL ELPH: 4.4 %
ALPHA1 GLOB SERPL ELPH-MCNC: 0.3 G/DL
ALPHA2 GLOB MFR SERPL ELPH: 11.2 %
ALPHA2 GLOB SERPL ELPH-MCNC: 0.8 G/DL
ALT SERPL-CCNC: 16 U/L
ANION GAP SERPL CALC-SCNC: 13 MMOL/L
APPEARANCE: CLEAR
AST SERPL-CCNC: 21 U/L
B-GLOBULIN MFR SERPL ELPH: 11.3 %
B-GLOBULIN SERPL ELPH-MCNC: 0.8 G/DL
BACTERIA: NEGATIVE
BASOPHILS # BLD AUTO: 0.07 K/UL
BASOPHILS NFR BLD AUTO: 1 %
BILIRUB SERPL-MCNC: 0.5 MG/DL
BILIRUBIN URINE: NEGATIVE
BLOOD URINE: NEGATIVE
BUN SERPL-MCNC: 26 MG/DL
CALCIUM SERPL-MCNC: 9 MG/DL
CHLORIDE SERPL-SCNC: 102 MMOL/L
CO2 SERPL-SCNC: 23 MMOL/L
COLOR: NORMAL
CREAT SERPL-MCNC: 1.63 MG/DL
CREAT SPEC-SCNC: 61 MG/DL
CREAT/PROT UR: 0.3 RATIO
DEPRECATED KAPPA LC FREE/LAMBDA SER: 1.8 RATIO
EGFR: 42 ML/MIN/1.73M2
EOSINOPHIL # BLD AUTO: 0.27 K/UL
EOSINOPHIL NFR BLD AUTO: 4 %
GAMMA GLOB FLD ELPH-MCNC: 0.8 G/DL
GAMMA GLOB MFR SERPL ELPH: 12.1 %
GLUCOSE QUALITATIVE U: NEGATIVE
GLUCOSE SERPL-MCNC: 92 MG/DL
HCT VFR BLD CALC: 41.6 %
HGB BLD-MCNC: 13 G/DL
HYALINE CASTS: 0 /LPF
IMM GRANULOCYTES NFR BLD AUTO: 0.1 %
INTERPRETATION SERPL IEP-IMP: NORMAL
KAPPA LC CSF-MCNC: 1.96 MG/DL
KAPPA LC SERPL-MCNC: 3.53 MG/DL
KETONES URINE: NEGATIVE
LEUKOCYTE ESTERASE URINE: NEGATIVE
LYMPHOCYTES # BLD AUTO: 1.55 K/UL
LYMPHOCYTES NFR BLD AUTO: 23.1 %
MAN DIFF?: NORMAL
MCHC RBC-ENTMCNC: 29.7 PG
MCHC RBC-ENTMCNC: 31.3 GM/DL
MCV RBC AUTO: 95 FL
MICROSCOPIC-UA: NORMAL
MONOCYTES # BLD AUTO: 0.7 K/UL
MONOCYTES NFR BLD AUTO: 10.4 %
NEUTROPHILS # BLD AUTO: 4.1 K/UL
NEUTROPHILS NFR BLD AUTO: 61.4 %
NITRITE URINE: NEGATIVE
PH URINE: 6
PLATELET # BLD AUTO: 188 K/UL
POTASSIUM SERPL-SCNC: 5.5 MMOL/L
PROT SERPL-MCNC: 6.7 G/DL
PROT UR-MCNC: 19 MG/DL
PROTEIN URINE: NORMAL
RBC # BLD: 4.38 M/UL
RBC # FLD: 13 %
RED BLOOD CELLS URINE: 0 /HPF
SODIUM SERPL-SCNC: 138 MMOL/L
SPECIFIC GRAVITY URINE: 1.01
SQUAMOUS EPITHELIAL CELLS: 0 /HPF
UROBILINOGEN URINE: NORMAL
WBC # FLD AUTO: 6.7 K/UL
WHITE BLOOD CELLS URINE: 0 /HPF

## 2023-03-09 ENCOUNTER — APPOINTMENT (OUTPATIENT)
Dept: UROLOGY | Facility: CLINIC | Age: 83
End: 2023-03-09

## 2023-03-13 ENCOUNTER — APPOINTMENT (OUTPATIENT)
Dept: UROLOGY | Facility: CLINIC | Age: 83
End: 2023-03-13
Payer: MEDICARE

## 2023-03-13 PROCEDURE — 99214 OFFICE O/P EST MOD 30 MIN: CPT

## 2023-03-13 NOTE — LETTER BODY
[FreeTextEntry1] : Bethany El MD\par [address] [place] [address], [address], \[place] [address]\par [phone]\par \par Dear Dr. El,\par \par Reason for Visit: BPH. Balanitis. Urinary frequency. \par \par This is an 82 year-old gentleman with chronic BPH, balanitis, and back pain. His renal ultrasound in August 2019 was unremarkable. His urodynamics testing and cystoscopy in June 2021 showed incomplete bladder emptying and a bladder outlet obstruction. His CT of abdomen and pelvis in August 2021 was unremarkable. 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 His cystoscopy demonstrated bilobar hypertrophy obstructing the urinary outlet. The patient returns today for follow up. He is going back to [facility] because his sister is ill. Since he was last seen, the patient reports taking Flomax BID, Proscar, and Myrbetriq 50 mg regularly without any side effects or difficulties. He reports stable symptoms on medical therapy. Patient's balanitis is resolved due to topical cream. He denies any hematuria, dysuria, or urinary incontinence. The patient denies any changes in health. All other review of systems are negative. Past medical history, family history and social history were unchanged. Medications and allergies were reviewed. He has no known allergies to medication. \par \par On examination, the patient is an older-appearing gentleman in no acute distress. He is alert and oriented follows commands. He has normal mood and affect. He is normocephalic. Neck is supple. Oral no thrush Respirations are unlabored. His abdomen is soft and nontender. Bladder is nonpalpable. No CVA tenderness. Neurologically he is grossly intact. No peripheral edema. Balanitis is present. His phimosis is improving. He has normal male external genitalia. Normal meatus. Bilateral testes are descended intrascrotally and normal to palpation. On rectal examination, there is normal sphincter tone. The prostate is clinically benign without focal induration or nodularity.\par \par ASSESSMENT: BPH, stable symptoms. Balanitis, resolved. Proteinuria. Urinary frequency.\par \par I counseled the patient. In terms of his BPH, I recommended the patient continue Flomax BID and Proscar. In terms of his urinary frequency, I recommended he continue taking Myrbetriq 50 mg. If the patient develops any side effects, the patient will discontinue the medication and contact me. Risks and alternatives were discussed. I answered the patient questions. The patient will follow-up as directed and will contact me with any questions or concerns. Thank you for the opportunity to participate in the care of this patient, I will keep you updated on his progress. \par \par Plan: Continue Flomax BID, Proscar, and Myrbetriq 50 mg. Follow up in 3 months.

## 2023-03-13 NOTE — ADDENDUM
[FreeTextEntry1] : Entered by Cm Garcia, acting as scribe for Dr. Nnamdi Carrillo.\par The documentation recorded by the scribe accurately reflects the service I personally performed and the decisions made by me.

## 2023-03-28 ENCOUNTER — APPOINTMENT (OUTPATIENT)
Dept: DERMATOLOGY | Facility: CLINIC | Age: 83
End: 2023-03-28
Payer: MEDICARE

## 2023-03-28 PROCEDURE — 99214 OFFICE O/P EST MOD 30 MIN: CPT

## 2023-03-28 RX ORDER — TRIAMCINOLONE ACETONIDE 1 MG/G
0.1 OINTMENT TOPICAL
Qty: 1 | Refills: 1 | Status: ACTIVE | COMMUNITY
Start: 2018-01-26 | End: 1900-01-01

## 2023-03-29 ENCOUNTER — NON-APPOINTMENT (OUTPATIENT)
Age: 83
End: 2023-03-29

## 2023-04-14 ENCOUNTER — APPOINTMENT (OUTPATIENT)
Dept: DERMATOLOGY | Facility: CLINIC | Age: 83
End: 2023-04-14

## 2023-04-27 ENCOUNTER — APPOINTMENT (OUTPATIENT)
Dept: UROLOGY | Facility: CLINIC | Age: 83
End: 2023-04-27

## 2023-06-15 ENCOUNTER — APPOINTMENT (OUTPATIENT)
Dept: UROLOGY | Facility: CLINIC | Age: 83
End: 2023-06-15
Payer: MEDICARE

## 2023-06-15 PROCEDURE — 99214 OFFICE O/P EST MOD 30 MIN: CPT

## 2023-06-17 NOTE — HISTORY OF PRESENT ILLNESS
[FreeTextEntry1] : Follow-up BPH.  Flomax twice daily and Proscar.\par \par Follow-up urinary frequency.  Myrbetriq 50 mg daily.  \par \par Continue medication.  Follow-up 6 months.

## 2023-06-17 NOTE — LETTER BODY
[FreeTextEntry1] : Bethany El MD\par [address] [address] [place] [address], [address], \[place] [address]\par [phone]\par \par Dear Dr. El,\par \par Reason for Visit: BPH. Urinary frequency. \par \par This is an 83 year-old gentleman with chronic BPH and urinary frequency. His renal ultrasound in May 2021 was unremarkable. His CT of abdomen and pelvis in August 2021 was unremarkable. 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 His cystoscopy in June 2022 demonstrated bilobar hypertrophy obstructing the urinary outlet. The patient returns today for follow up. Since he was last seen, the patient reports taking Flomax BID, Proscar, and Myrbetriq 50 mg regularly without any side effects or difficulties. He reports stable symptoms on medical therapy. Patient's balanitis is resolved due to topical cream. He denies any hematuria, dysuria, or urinary incontinence. The patient denies any changes in health. All other review of systems are negative. Past medical history, family history and social history were unchanged. Medications and allergies were reviewed. He has no known allergies to medication. \par \par On examination, the patient is an older-appearing gentleman in no acute distress. He is alert and oriented follows commands. He has normal mood and affect. He is normocephalic. Neck is supple. Oral no thrush Respirations are unlabored. His abdomen is soft and nontender. Bladder is nonpalpable. No CVA tenderness. Neurologically he is grossly intact. No peripheral edema. Balanitis is resolved. His phimosis is improving. He has normal male external genitalia. Normal meatus. Bilateral testes are descended intrascrotally and normal to palpation. On rectal examination, there is normal sphincter tone. The prostate is clinically benign without focal induration or nodularity.\par \par ASSESSMENT: BPH, stable symptoms. Urinary frequency. \par \par I counseled the patient. In terms of his BPH, I recommended the patient continues Flomax BID and Proscar. In terms of his urinary frequency, I recommended he continues taking Myrbetriq 50 mg. If the patient develops any side effects, the patient will discontinue the medication and contact me. Risks and alternatives were discussed. I answered the patient questions. The patient will follow-up as directed and will contact me with any questions or concerns. Thank you for the opportunity to participate in the care of this patient, I will keep you updated on his progress. \par \par Plan: Continue Flomax BID, Proscar, and Myrbetriq 50 mg. Follow up in 6 months.

## 2023-06-27 ENCOUNTER — APPOINTMENT (OUTPATIENT)
Dept: DERMATOLOGY | Facility: CLINIC | Age: 83
End: 2023-06-27
Payer: MEDICARE

## 2023-06-27 PROCEDURE — 99214 OFFICE O/P EST MOD 30 MIN: CPT | Mod: 25

## 2023-06-27 PROCEDURE — 11900 INJECT SKIN LESIONS </W 7: CPT

## 2023-06-27 NOTE — HISTORY OF PRESENT ILLNESS
[FreeTextEntry1] : f/u eczema  [de-identified] : 84yo M here for f/u eczema x years. Restarted Dupixent in June 2021 with significant improvement. LV 3/2023. Since LV, patient has improved overall on dupixent. Tolerating well without SE. Notes a few areas on the extremities that are itchy. Ran out of topical steroid. \par \par Initial history: 77 year old M w/ PMH of asthma here for evaluation of an itchy rash scattered over the whole body, but most concentrated on his lower legs and dorsal feet. Present for 1-2 mo. He has never had any skin rashes prior to this. He was initially given hydrocortisone cream by his PCP, which did not help. He went to the [facility] ED on 1/18 and was given prednisone 50mg BID. The itch is most severe at night, and sometimes prevents him from sleeping. He has not tried antihistamines. He uses [place] soap. He does not moisturize. No new medications. No systemic sx. Had his kidney function checked by his PCP, which was wnl, per pt.

## 2023-06-27 NOTE — PHYSICAL EXAM
[Alert] : alert [Oriented x 3] : ~L oriented x 3 [Well Nourished] : well nourished [Conjunctiva Non-injected] : conjunctiva non-injected [No Visual Lymphadenopathy] : no visual  lymphadenopathy [No Clubbing] : no clubbing [No Edema] : no edema [No Bromhidrosis] : no bromhidrosis [No Chromhidrosis] : no chromhidrosis [FreeTextEntry3] : Hypopigmented papules on the elbows and legs

## 2023-07-31 ENCOUNTER — APPOINTMENT (OUTPATIENT)
Dept: UROLOGY | Facility: CLINIC | Age: 83
End: 2023-07-31
Payer: MEDICARE

## 2023-07-31 DIAGNOSIS — R05.3 CHRONIC COUGH: ICD-10-CM

## 2023-07-31 PROCEDURE — 99214 OFFICE O/P EST MOD 30 MIN: CPT

## 2023-07-31 RX ORDER — BETAMETHASONE DIPROPIONATE 0.5 MG/G
0.05 OINTMENT, AUGMENTED TOPICAL
Qty: 1 | Refills: 2 | Status: ACTIVE | COMMUNITY
Start: 2021-03-19 | End: 1900-01-01

## 2023-08-05 LAB
ANION GAP SERPL CALC-SCNC: 14 MMOL/L
APPEARANCE: CLEAR
BACTERIA UR CULT: ABNORMAL
BACTERIA: NEGATIVE /HPF
BILIRUBIN URINE: NEGATIVE
BLOOD URINE: NEGATIVE
BUN SERPL-MCNC: 29 MG/DL
CALCIUM SERPL-MCNC: 9.8 MG/DL
CAST: 1 /LPF
CHLORIDE SERPL-SCNC: 103 MMOL/L
CO2 SERPL-SCNC: 23 MMOL/L
COLOR: YELLOW
CREAT SERPL-MCNC: 1.49 MG/DL
EGFR: 46 ML/MIN/1.73M2
EPITHELIAL CELLS: 1 /HPF
GLUCOSE QUALITATIVE U: NEGATIVE MG/DL
GLUCOSE SERPL-MCNC: 88 MG/DL
KETONES URINE: NEGATIVE MG/DL
LEUKOCYTE ESTERASE URINE: NEGATIVE
MICROSCOPIC-UA: NORMAL
NITRITE URINE: NEGATIVE
PH URINE: 5.5
POTASSIUM SERPL-SCNC: 5.7 MMOL/L
PROTEIN URINE: 30 MG/DL
PSA FREE FLD-MCNC: 28 %
PSA FREE SERPL-MCNC: 0.18 NG/ML
PSA SERPL-MCNC: 0.63 NG/ML
RED BLOOD CELLS URINE: 1 /HPF
SODIUM SERPL-SCNC: 140 MMOL/L
SPECIFIC GRAVITY URINE: 1.02
UROBILINOGEN URINE: 0.2 MG/DL
WHITE BLOOD CELLS URINE: 0 /HPF

## 2023-08-07 NOTE — LETTER BODY
[FreeTextEntry1] : Bethany El MD [address] [address] [place] [address], [address], [place] [address] [phone]  Dear Dr. El,  Reason for Visit: BPH. Urinary frequency.  This is an 83 year-old gentleman with chronic BPH and urinary frequency. His renal ultrasound in May 2021 was unremarkable. His CT of abdomen and pelvis in August 2021 was unremarkable. 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 His cystoscopy in June 2022 demonstrated bilobar hypertrophy obstructing the urinary outlet. The patient returns today for follow up. Since he was last seen, the patient reports taking Flomax BID, Proscar, and Myrbetriq 50 mg regularly without any side effects or difficulties. He reports stable urinary symptoms on medical therapy. Patient's balanitis is resolved due to topical cream. He denies any hematuria, dysuria, or urinary incontinence. The patient denies any changes in health. All other review of systems are negative. Past medical history, family history and social history were unchanged. Medications and allergies were reviewed. He has no known allergies to medication.  On examination, the patient is an older-appearing gentleman in no acute distress. He is alert and oriented follows commands. He has normal mood and affect. He is normocephalic. Neck is supple. Oral no thrush Respirations are unlabored. His abdomen is soft and nontender. Bladder is nonpalpable. No CVA tenderness. Neurologically he is grossly intact. No peripheral edema. Balanitis is resolved. His phimosis is improving. He has normal male external genitalia. Normal meatus. Bilateral testes are descended intrascrotally and normal to palpation. On rectal examination, there is normal sphincter tone. The prostate is clinically benign without focal induration or nodularity.  ASSESSMENT: BPH, stable symptoms. Urinary frequency.  I counseled the patient. In terms of his BPH, I recommended the patient continues Flomax BID and Proscar. In terms of his urinary frequency, I recommended he continues taking Myrbetriq 50 mg. If the patient develops any side effects, the patient will discontinue the medication and contact me. Risks and alternatives were discussed. I answered the patient questions. The patient will follow-up as directed and will contact me with any questions or concerns. Thank you for the opportunity to participate in the care of this patient, I will keep you updated on his progress.    Plan: Continue Flomax BID, Proscar, and Myrbetriq 50 mg. Follow up in 1 year

## 2023-08-07 NOTE — ADDENDUM
[FreeTextEntry1] : Entered by Christiano Shaw, acting as scribe for Dr. Nnamdi Carrillo. The documentation recorded by the scribe accurately reflects the service I personally performed and the decisions made by me.

## 2023-10-09 NOTE — ED PROVIDER NOTE - GENITOURINARY NEGATIVE STATEMENT, MLM
[facility] Lactation Visit    Assessment:     difficulty in feeding at breast     Josef Mehta is a 5 week old old male infant born at 36 weeks and 4 days via C/S delivery on 2023 here for lactation support.    Josef Mehta is doing well. Josef Mehta has gained 9.5 oz since their last visit 6 days ago; approximately 1.5 oz per day.  He is gaining adequate weight per day. Main concern today is making sure infant is getting enough from the breast. Family also wondering how much milk to put in the bottle for feedings. His main caloric intake is now main from breast-feeding. He does receive one bottle once daily. Infant was able to transfer 4.1 oz from the breast today. This is great improvement from his last lactation visit. Discussed offering 4 oz of pumped milk when parents bottle-feed.     Plan:    Josef is gaining adequate weight!  You guys are doing so awesome! What an improvement from the last lactation visit. Such good teamwork between the three of you!    As discussed, below the feeding recommendations for Josef Mehta:    Feeding plan: continue to nurse based on infant cues, at least 8-12 times a day. Focus on listening for swallows rather than watching the time. Try the breast C-hold if Josef is shaking his head and has a hard time finding the nipple.    Supplementation: Josef was able to transfer 4.1 oz from the breast today. This is great! A typical feeding volume is about 3-5 oz. If bottle-feeding, offer 4 oz and increase based on his cues.    Pumping plan:  pump as needed for relief from engorgement.    Continue to monitor output, expect at least 6 wet diapers per day and 2-4 stools in a day. If Josef Mehta has less than the recommended wet diapers, please call us.     Follow up with your primary care provider in 1 month for wellness visit.    Maternal nipple care:   Best way to help decrease nipple soreness is to obtain a deep latch. When you pump, nipples should not  touch the sides of the flange. Apply lanolin or coconut oil after breast-feeding or pumping. Wipe away left over residue before next breast-feeding or pumping. Allow nipples to air dry as much as possible to help stimulate healing. If mother is experiencing persistent breast pain, flu-like symptoms, localized breast tenderness/redness/warmness, or fevers, please contact mother's primary care provider or Obstetrician/midwife for further evaluation.    Return to clinic sooner or call clinic sooner for any worsening symptoms (inconsolability, fever greater than 100.4F, less wet diapers, no stools, sleepiness, difficulty feeding, abdominal distention).    For further lactation concerns, please call [phone] or [phone].     ____________________________________________________________________  SUBJECTIVE:     Josef Mehta is a 5 week old old male infant born at Gestational Age: 36w4d via , Low Transverse delivery on 2023 at 7:57 PM here for lactation support. This patient is accompanied in the office by his mother and father.     Concerns: mom would like to see how much baby is getting. They are mainly breast-feeding now. Mom gives 1 bottle daily and she pumps during this time. Mom wondering how much to give via bottle.    Baby is nursing every 2-3 hours for about 8-25 minutes per session.   Mother reports hearing audible swallows.   Baby feeds about 8-12 times in 24 hours and wakes up on own for feeds.  Baby gets 1 bottle of 2 oz once daily when mom skips nursing.   He nurses every other feed. Non-projectile  Baby has about 8-12 wet diapers and 3-4 stools per day. Stools are yellow in color.    Mom is also pumping about 2 per day and gets about 2-5 oz per pumping session.    Patient Active Problem List    Diagnosis Date Noted    Fetal and  jaundice 2023     Priority: Medium     infant of 36 completed weeks of gestation 2023     Priority: Medium    SGA (small for  gestational age) 09/02/2023     Priority: Medium     No results found for any visits on 10/09/23.    Current Outpatient Medications:     simethicone (MYLICON INFANTS GAS RELIEF) 40 MG/0.6ML suspension, Take 40 mg by mouth 4 times daily as needed for cramping, Disp: , Rfl:   No past medical history on file.  No past surgical history on file.  Family History   Problem Relation Age of Onset    Thyroid Disease Mother     Hypertension Maternal Grandmother     Hyperlipidemia Maternal Grandmother        Primary care provider: Michelle Moctezuma    OBJECTIVE:    Mother:   Nipples are everted, the areola is compressible, the breast is soft and full.     Sore nipples: none   Maternal pain: none    Maternal depression screening: Doing well; sees a therapist. Mom reports overall mood has improved now that breast-feeding is getting better and mom is able to get some rest.    Infant:   Vitals:    10/09/23 1353   Temp: 99.1  F (37.3  C)   TempSrc: Rectal   Weight: 8 lb 7 oz (3.827 kg)       Average weight gain over last 6 days: 9.5 oz     Weight:   Birthweight: 5 lbs 7.48 oz  Today's weight: 8 lbs 7 oz    54% from birth weight.    Amount of milk transferred from LEFT side: 2.1 oz (10-11 minutes)  Amount of milk transferred from RIGHT side: 2 oz    Total transfer: 4.1 oz    Feeding assessment:     Infant can draw gloved finger into mouth. Infant demonstrated a coordinated suck. Infant palate is intact, tongue over gums, visible anterior lingual frenulum   Infant can hold suction with tongue while at the breast. Infant did not need frequent stimulation at the breast.     Alignment: Infant's head was aligned with its trunk. Infant did face mother. Baby was in cross cradle position today. Discussed importance of infant ventral positioning to obtain a deeper latch.     Areolar Grasp: Infant was able to open mouth wide. Infant's lips were not pursed. Infant's lips did flange outward. Tongue was visible just barely over bottom lip.  "Infant had complete seal.     Areolar Compression: Infant made rhythmic motion. There were no clicking or smacking sounds. There was no severe nipple discomfort.  Nipples appeared round after feeding.    Audible swallowing: Infant made quiet sounds of swallowing. There was an increase in frequency after milk ejection reflex.     PHYSICAL EXAM    Gen: Alert, no acute distress.   Head: Anterior and posterior fontanelles are flat and soft.   Eyes: No eye drainage. Mild scleral icterus.   Ears: Pinna appear well-formed. No pits.   Nose: nares patent. No nasal congestion. No nasal flaring.  Mouth: Oral mucosa moist. Tongue midline (tongue elevation adequate when crying, good lateralization ). Frenulum palpable and visible. No \"heart-shaped\" tongue. Tongue able to extend pass lower gumline. Lips closed at rest.   Neck: Clavicles intact bilaterally.  Lungs: Clear to auscultation bilaterally.   Cardiac: Regular regular rate and rhythm, S1S2, no murmurs. Brachial and femoral pulses +2 and equal.  Abdomen: Soft, nontender, bowel sounds present, no hepatosplenomegaly or mass palpable. Umbilicus  dry with no erythema or drainage.   : Edison stage 1 male genitalia  Skin: Intact. Dry. No rash. No jaundice.   Musculoskeletal: equal movements of upper and lower extremities.   Neuro: Appropriate muscle tone.    The visit lasted a total of 54 minutes that I spent face to face with the patient. Of that time, 54 minutes were spent on lactation. Over 50% of the time was spent counseling and educating the patient about normal  care and growth,breast-feeding, lactation, milk production, bottle-feeding.    Completed by:   Abhishek Mo, RADHA, CPNP, IBCLC  [facility]  [facility]  10/9/2023, 2:05 PM                " no dysuria, no frequency, and no hematuria.

## 2023-10-22 NOTE — ED PROVIDER NOTE - NS ED MD DISPO DISCHARGE
Pt arrives with daughter from home for evaluation of a laceration to her left palm she sustained while cutting a piece of bread around 2150. Pt was unable to get the bleeding to stop while at home. Bandage placed in triage, laceration about 1cm.      Triage Assessment (Adult)       Row Name 10/21/23 7445          Triage Assessment    Airway WDL WDL        Respiratory WDL    Respiratory WDL WDL        Skin Circulation/Temperature WDL    Skin Circulation/Temperature WDL WDL        Cardiac WDL    Cardiac WDL WDL        Peripheral/Neurovascular WDL    Peripheral Neurovascular WDL X;neurovascular assessment upper        Cognitive/Neuro/Behavioral WDL    Cognitive/Neuro/Behavioral WDL WDL        LUE Neurovascular Assessment    Temperature LUE warm     Color LUE no discoloration     Sensation LUE tenderness present;tingling present                     
Home

## 2023-11-14 ENCOUNTER — APPOINTMENT (OUTPATIENT)
Dept: DERMATOLOGY | Facility: CLINIC | Age: 83
End: 2023-11-14

## 2023-12-07 ENCOUNTER — APPOINTMENT (OUTPATIENT)
Dept: UROLOGY | Facility: CLINIC | Age: 83
End: 2023-12-07
Payer: MEDICARE

## 2023-12-07 PROCEDURE — 99214 OFFICE O/P EST MOD 30 MIN: CPT

## 2023-12-14 ENCOUNTER — APPOINTMENT (OUTPATIENT)
Dept: UROLOGY | Facility: CLINIC | Age: 83
End: 2023-12-14

## 2024-02-07 ENCOUNTER — NON-APPOINTMENT (OUTPATIENT)
Age: 84
End: 2024-02-07

## 2024-02-27 ENCOUNTER — APPOINTMENT (OUTPATIENT)
Dept: DERMATOLOGY | Facility: CLINIC | Age: 84
End: 2024-02-27
Payer: MEDICARE

## 2024-02-27 DIAGNOSIS — L20.9 ATOPIC DERMATITIS, UNSPECIFIED: ICD-10-CM

## 2024-02-27 DIAGNOSIS — L28.1 PRURIGO NODULARIS: ICD-10-CM

## 2024-02-27 PROCEDURE — 99214 OFFICE O/P EST MOD 30 MIN: CPT

## 2024-02-27 RX ORDER — TRIAMCINOLONE ACETONIDE 1 MG/G
0.1 OINTMENT TOPICAL
Qty: 1 | Refills: 1 | Status: ACTIVE | COMMUNITY
Start: 2021-03-19 | End: 1900-01-01

## 2024-03-11 ENCOUNTER — OUTPATIENT (OUTPATIENT)
Dept: OUTPATIENT SERVICES | Facility: HOSPITAL | Age: 84
LOS: 1 days | End: 2024-03-11
Payer: MEDICARE

## 2024-03-11 ENCOUNTER — APPOINTMENT (OUTPATIENT)
Dept: ULTRASOUND IMAGING | Facility: IMAGING CENTER | Age: 84
End: 2024-03-11
Payer: MEDICARE

## 2024-03-11 ENCOUNTER — APPOINTMENT (OUTPATIENT)
Dept: UROLOGY | Facility: CLINIC | Age: 84
End: 2024-03-11
Payer: MEDICARE

## 2024-03-11 ENCOUNTER — NON-APPOINTMENT (OUTPATIENT)
Age: 84
End: 2024-03-11

## 2024-03-11 DIAGNOSIS — N28.9 DISORDER OF KIDNEY AND URETER, UNSPECIFIED: ICD-10-CM

## 2024-03-11 DIAGNOSIS — N40.1 BENIGN PROSTATIC HYPERPLASIA WITH LOWER URINARY TRACT SYMPMS: ICD-10-CM

## 2024-03-11 DIAGNOSIS — R10.9 UNSPECIFIED ABDOMINAL PAIN: ICD-10-CM

## 2024-03-11 DIAGNOSIS — N26.9 RENAL SCLEROSIS, UNSPECIFIED: ICD-10-CM

## 2024-03-11 LAB
ANION GAP SERPL CALC-SCNC: 11 MMOL/L
BUN SERPL-MCNC: 33 MG/DL
CALCIUM SERPL-MCNC: 9.2 MG/DL
CHLORIDE SERPL-SCNC: 104 MMOL/L
CO2 SERPL-SCNC: 25 MMOL/L
CREAT SERPL-MCNC: 1.62 MG/DL
EGFR: 42 ML/MIN/1.73M2
GLUCOSE SERPL-MCNC: 88 MG/DL
POTASSIUM SERPL-SCNC: 5.3 MMOL/L
PSA FREE FLD-MCNC: 25 %
PSA FREE SERPL-MCNC: 0.23 NG/ML
PSA SERPL-MCNC: 0.91 NG/ML
SODIUM SERPL-SCNC: 140 MMOL/L

## 2024-03-11 PROCEDURE — 99214 OFFICE O/P EST MOD 30 MIN: CPT

## 2024-03-11 PROCEDURE — 76775 US EXAM ABDO BACK WALL LIM: CPT

## 2024-03-11 PROCEDURE — G2211 COMPLEX E/M VISIT ADD ON: CPT

## 2024-03-11 PROCEDURE — 76775 US EXAM ABDO BACK WALL LIM: CPT | Mod: 26

## 2024-03-11 RX ORDER — MIRABEGRON 50 MG/1
50 TABLET, FILM COATED, EXTENDED RELEASE ORAL
Qty: 90 | Refills: 3 | Status: ACTIVE | COMMUNITY
Start: 2021-07-26 | End: 1900-01-01

## 2024-03-11 RX ORDER — FINASTERIDE 5 MG/1
5 TABLET, FILM COATED ORAL
Qty: 90 | Refills: 3 | Status: ACTIVE | COMMUNITY
Start: 2022-06-06 | End: 1900-01-01

## 2024-03-11 RX ORDER — TAMSULOSIN HYDROCHLORIDE 0.4 MG/1
0.4 CAPSULE ORAL
Qty: 180 | Refills: 3 | Status: ACTIVE | COMMUNITY
Start: 2019-07-18 | End: 1900-01-01

## 2024-03-11 NOTE — LETTER BODY
[FreeTextEntry1] : Aliza Morales MD [address] [place] [id number] [phone]  Dear Dr. Morales,  Reason for Visit: BPH. Urinary frequency.  This is an 83 year-old gentleman with chronic BPH and urinary frequency. His renal ultrasound in May 2021 was unremarkable. His CT of abdomen and pelvis in August 2021 was unremarkable. 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 His cystoscopy in June 2022 demonstrated bilobar hypertrophy obstructing the urinary outlet. The patient returns today for follow up. Unfortunately patient's wife passed away. Patient is mourning the loss of his wife. Since he was last seen, the patient reports taking Flomax BID, Proscar, and Myrbetriq 50 mg regularly without any side effects or difficulties. He reports stable urinary symptoms on medical therapy. Patient incidental CT scan of the chest demonstrated possible concern for left renal lesion. Patient has a long history of complex renal cysts. His last CT scan demonstrated 1.2 cm complex left renal cyst. He denies any hematuria, dysuria, or urinary incontinence. The patient denies any changes in health. All other review of systems are negative. Past medical history, family history and social history were unchanged. Medications and allergies were reviewed. He has no known allergies to medication.  On examination, the patient is an older-appearing gentleman in no acute distress. He is alert and oriented follows commands. He has normal mood and affect. He is normocephalic. Neck is supple. Oral no thrush Respirations are unlabored. His abdomen is soft and nontender. Bladder is nonpalpable. No CVA tenderness. Neurologically he is grossly intact. No peripheral edema.   His BMP showed decreased renal function, creatinine 1.5.   ASSESSMENT: BPH, stable symptoms. Urinary frequency.  I counseled the patient. In terms of his BPH, I recommended the patient continues Flomax BID and Proscar. In terms of his urinary frequency, I recommended he continues taking Myrbetriq 50 mg. Patient has stable urinary symptoms. If the patient develops any side effects, the patient will discontinue the medication and contact me. I renewed the patient's prescription for Flomax BID, Proscar and Myrbetriq today. I encouraged the patient to continue medications regularly as directed. In terms of his left renal lesion, patient has decreased renal function, creatinine 1.5. I recommended patient undergoes renal ultrasound. He will repeat PSA and BMP to establish baseline. I counseled the patient regarding the procedure. The risks and benefits were discussed. Alternatives were given. I answered the patient questions. The patient will take the necessary preparations for the procedure. The patient will follow-up as directed and will contact me with any questions or concerns. Thank you for the opportunity to participate in the care of this patient, I will keep you updated on his progress.  Patient will continue longitudinal care for his complex and serious chronic condition.  Plan: Continue Flomax BID, Proscar, and Myrbetriq 50 mg. Renal Ultrasound. PSA. BMP. Follow up in 6 months.   
decreased yokasta/decreased step length

## 2024-03-11 NOTE — HISTORY OF PRESENT ILLNESS
[FreeTextEntry1] : Follow-up BPH.  Flomax Proscar.  Follow-up urine frequency.  Myrbetriq.  PSA.  Follow-up 6 months.  Possibly has also indeterminant renal lesion.  Renal ultrasound as patient has a creatinine 1.5.  Patient is mourning the loss of his wife.  Please refer to URO Consult note

## 2024-04-15 RX ORDER — DUPILUMAB 300 MG/2ML
300 INJECTION, SOLUTION SUBCUTANEOUS
Qty: 1 | Refills: 5 | Status: ACTIVE | COMMUNITY
Start: 2018-10-22

## 2024-04-25 NOTE — ADDENDUM
[FreeTextEntry1] : Entered by Cm Garcia, acting as scribe for Dr. Nnamdi Carrillo.\par The documentation recorded by the scribe accurately reflects the service I personally performed and the decisions made by me. 
59.9

## 2024-05-28 ENCOUNTER — APPOINTMENT (OUTPATIENT)
Dept: DERMATOLOGY | Facility: CLINIC | Age: 84
End: 2024-05-28

## 2024-06-06 ENCOUNTER — APPOINTMENT (OUTPATIENT)
Dept: UROLOGY | Facility: CLINIC | Age: 84
End: 2024-06-06

## 2024-07-03 NOTE — ED PROVIDER NOTE - TIMING
Patient was arranged to have monthly EKG at [facility] in [place], as ordered by Dr. Olguin, on 07/05/24 at 10:00 am and lab appointment (repeat creatinine) at 10:30 am.  Writer called and updated patient with these appointments.  Patient verbalized understanding.  Patient stated that he uses his electric wheelchair to get to his appointments and if it rains on 7/05, he may need to reschedule these appointments, but he will keep the appointments for now and see how the weather is on 7/05.   constant

## 2024-08-01 ENCOUNTER — APPOINTMENT (OUTPATIENT)
Dept: UROLOGY | Facility: CLINIC | Age: 84
End: 2024-08-01
Payer: MEDICARE

## 2024-08-01 PROCEDURE — 99214 OFFICE O/P EST MOD 30 MIN: CPT

## 2024-08-01 PROCEDURE — 51798 US URINE CAPACITY MEASURE: CPT

## 2024-08-01 PROCEDURE — G2211 COMPLEX E/M VISIT ADD ON: CPT

## 2024-08-02 LAB
APPEARANCE: CLEAR
BACTERIA: NEGATIVE /HPF
BILIRUBIN URINE: NEGATIVE
BLOOD URINE: NEGATIVE
CAST: 1 /LPF
COLOR: YELLOW
EPITHELIAL CELLS: 0 /HPF
GLUCOSE QUALITATIVE U: NEGATIVE MG/DL
KETONES URINE: NEGATIVE MG/DL
LEUKOCYTE ESTERASE URINE: NEGATIVE
MICROSCOPIC-UA: NORMAL
NITRITE URINE: NEGATIVE
PH URINE: 6
PROTEIN URINE: 100 MG/DL
RED BLOOD CELLS URINE: 0 /HPF
SPECIFIC GRAVITY URINE: 1.02
UROBILINOGEN URINE: 0.2 MG/DL
WHITE BLOOD CELLS URINE: 0 /HPF

## 2024-08-03 NOTE — LETTER BODY
[FreeTextEntry1] : Aliza Morales MD [address] [place] [phone]  Dear Dr. Morales,  Reason for Visit: BPH. Urinary frequency.  This is an 84-year-old gentleman with chronic BPH and urinary frequency. His renal ultrasound in May 2021 was unremarkable. His CT of abdomen and pelvis in August 2021 was unremarkable. 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 His cystoscopy in June 2022 demonstrated bilobar hypertrophy obstructing the urinary outlet. Patient incidental CT scan of the chest demonstrated possible concern for left renal lesion. Patient has a long history of complex renal cysts. His last CT scan demonstrated 1.2 cm complex left renal cyst.  Unfortunately patient's wife passed away. Patient is mourning the loss of his wife. The patient returns today for follow up. Since he was last seen, the patient reports taking Flomax BID, Proscar, and Myrbetriq 50 mg regularly without any side effects or difficulties. He reports stable urinary symptoms on medical therapy. He denies any hematuria, dysuria, or urinary incontinence. He also reports of persistent nocturia. However, he also reports drinking a significant amount of water after dinner.. All other review of systems are negative. Past medical history, family history and social history were unchanged. Medications and allergies were reviewed. He has no known allergies to medication.  On examination, the patient is an older-appearing gentleman in no acute distress. He is alert and oriented follows commands. He has normal mood and affect. He is normocephalic. Neck is supple. Oral no thrush Respirations are unlabored. His abdomen is soft and nontender. Bladder is nonpalpable. No CVA tenderness. Neurologically he is grossly intact. No peripheral edema.  His BMP showed decreased renal function, creatinine 1.62. His PSA is 0.9, WNL.   Post-void residual on bladder scan today was 121 cc.   ASSESSMENT: BPH, stable symptoms. Urinary frequency.  I counseled the patient. In terms of his BPH, I recommended the patient continues Flomax BID and Proscar. In terms of his urinary frequency, I recommended he continues taking Myrbetriq 50 mg. Patient has stable urinary symptoms. If the patient develops any side effects, the patient will discontinue the medication and contact me. I renewed the patient's prescription for Flomax BID, Proscar and Myrbetriq today. I encouraged the patient to continue medications regularly as directed. He reports persistent nocturia. His PVR today was 121 cc. However, He drinks a significant amount of water after dinner. I recommended the patient restrict fluids after dinner. He will obtain urinalysis and urine culture to look for infections. The risks and benefits were discussed. Alternatives were given. I answered the patient questions. The patient will take the necessary preparations for the procedure. The patient will follow-up as directed and will contact me with any questions or concerns. Thank you for the opportunity to participate in the care of this patient, I will keep you updated on his progress.  Patient will continue longitudinal care for his complex and serious chronic condition.  Plan: Continue Flomax BID, Proscar, and Myrbetriq 50 mg. Fluid restriction. Urinalysis. Urine culture. PVR.  Follow up in 3 months.

## 2024-08-03 NOTE — ADDENDUM
[FreeTextEntry1] : Entered by Davis Madrid, acting as scribe for Dr. Nnamdi Carrillo. The documentation recorded by the scribe accurately reflects the service I personally performed and the decisions made by me.

## 2024-08-03 NOTE — LETTER BODY
[FreeTextEntry1] : Aliza Morales MD [id number] [place] [id number] [phone]  Dear Dr. Morales,  Reason for Visit: BPH. Urinary frequency.  This is an 84-year-old gentleman with chronic BPH and urinary frequency. His renal ultrasound in May 2021 was unremarkable. His CT of abdomen and pelvis in August 2021 was unremarkable. 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 His cystoscopy in June 2022 demonstrated bilobar hypertrophy obstructing the urinary outlet. Patient incidental CT scan of the chest demonstrated possible concern for left renal lesion. Patient has a long history of complex renal cysts. His last CT scan demonstrated 1.2 cm complex left renal cyst.  Unfortunately patient's wife passed away. Patient is mourning the loss of his wife. The patient returns today for follow up. Since he was last seen, the patient reports taking Flomax BID, Proscar, and Myrbetriq 50 mg regularly without any side effects or difficulties. He reports stable urinary symptoms on medical therapy. He denies any hematuria, dysuria, or urinary incontinence. He also reports of persistent nocturia. However, he also reports drinking a significant amount of water after dinner.. All other review of systems are negative. Past medical history, family history and social history were unchanged. Medications and allergies were reviewed. He has no known allergies to medication.  On examination, the patient is an older-appearing gentleman in no acute distress. He is alert and oriented follows commands. He has normal mood and affect. He is normocephalic. Neck is supple. Oral no thrush Respirations are unlabored. His abdomen is soft and nontender. Bladder is nonpalpable. No CVA tenderness. Neurologically he is grossly intact. No peripheral edema.  His BMP showed decreased renal function, creatinine 1.62. His PSA is 0.9, WNL.   Post-void residual on bladder scan today was 121 cc.   ASSESSMENT: BPH, stable symptoms. Urinary frequency.  I counseled the patient. In terms of his BPH, I recommended the patient continues Flomax BID and Proscar. In terms of his urinary frequency, I recommended he continues taking Myrbetriq 50 mg. Patient has stable urinary symptoms. If the patient develops any side effects, the patient will discontinue the medication and contact me. I renewed the patient's prescription for Flomax BID, Proscar and Myrbetriq today. I encouraged the patient to continue medications regularly as directed. He reports persistent nocturia. His PVR today was 121 cc. However, He drinks a significant amount of water after dinner. I recommended the patient restrict fluids after dinner. He will obtain urinalysis and urine culture to look for infections. The risks and benefits were discussed. Alternatives were given. I answered the patient questions. The patient will take the necessary preparations for the procedure. The patient will follow-up as directed and will contact me with any questions or concerns. Thank you for the opportunity to participate in the care of this patient, I will keep you updated on his progress.  Patient will continue longitudinal care for his complex and serious chronic condition.  Plan: Continue Flomax BID, Proscar, and Myrbetriq 50 mg. Fluid restriction. Urinalysis. Urine culture. PVR.  Follow up in 3 months.

## 2024-08-03 NOTE — HISTORY OF PRESENT ILLNESS
[FreeTextEntry1] : Follow-up BPH.  Flomax Proscar.  Follow-up in frequency.  Myrbetriq 50 mg.  PSA 0.9.  Patient reports persistent nocturia.  However he drinks significant amount of water after dinner.  Fluid restriction.  Follow-up 3 months.  Please refer to URO Consult Note.

## 2024-08-05 LAB — BACTERIA UR CULT: ABNORMAL

## 2024-09-23 ENCOUNTER — RX RENEWAL (OUTPATIENT)
Age: 84
End: 2024-09-23

## 2024-11-07 ENCOUNTER — NON-APPOINTMENT (OUTPATIENT)
Age: 84
End: 2024-11-07

## 2024-11-07 ENCOUNTER — APPOINTMENT (OUTPATIENT)
Dept: UROLOGY | Facility: CLINIC | Age: 84
End: 2024-11-07
Payer: MEDICARE

## 2024-11-07 DIAGNOSIS — N28.1 CYST OF KIDNEY, ACQUIRED: ICD-10-CM

## 2024-11-07 DIAGNOSIS — N40.1 BENIGN PROSTATIC HYPERPLASIA WITH LOWER URINARY TRACT SYMPMS: ICD-10-CM

## 2024-11-07 DIAGNOSIS — R35.0 FREQUENCY OF MICTURITION: ICD-10-CM

## 2024-11-07 PROCEDURE — G2211 COMPLEX E/M VISIT ADD ON: CPT

## 2024-11-07 PROCEDURE — 99214 OFFICE O/P EST MOD 30 MIN: CPT

## 2024-11-08 LAB
APPEARANCE: CLEAR
BACTERIA: NEGATIVE /HPF
BILIRUBIN URINE: NEGATIVE
BLOOD URINE: NEGATIVE
CAST: 28 /LPF
COLOR: NORMAL
EPITHELIAL CELLS: 1 /HPF
GLUCOSE QUALITATIVE U: NEGATIVE MG/DL
HYALINE CASTS: PRESENT
KETONES URINE: ABNORMAL MG/DL
LEUKOCYTE ESTERASE URINE: ABNORMAL
MICROSCOPIC-UA: NORMAL
NITRITE URINE: NEGATIVE
PH URINE: 5.5
PROTEIN URINE: 100 MG/DL
RED BLOOD CELLS URINE: 1 /HPF
REVIEW: NORMAL
SPECIFIC GRAVITY URINE: 1.02
UROBILINOGEN URINE: 0.2 MG/DL
WHITE BLOOD CELLS URINE: 1 /HPF

## 2024-11-12 LAB — BACTERIA UR CULT: ABNORMAL

## 2024-12-02 ENCOUNTER — APPOINTMENT (OUTPATIENT)
Dept: UROLOGY | Facility: CLINIC | Age: 84
End: 2024-12-02
Payer: MEDICARE

## 2024-12-02 VITALS
OXYGEN SATURATION: 98 % | BODY MASS INDEX: 22.56 KG/M2 | TEMPERATURE: 97.7 F | HEART RATE: 74 BPM | HEIGHT: 78 IN | RESPIRATION RATE: 17 BRPM | DIASTOLIC BLOOD PRESSURE: 85 MMHG | SYSTOLIC BLOOD PRESSURE: 180 MMHG | WEIGHT: 195 LBS

## 2024-12-02 DIAGNOSIS — N39.41 URGE INCONTINENCE: ICD-10-CM

## 2024-12-02 DIAGNOSIS — N32.81 OVERACTIVE BLADDER: ICD-10-CM

## 2024-12-02 DIAGNOSIS — N40.1 BENIGN PROSTATIC HYPERPLASIA WITH LOWER URINARY TRACT SYMPMS: ICD-10-CM

## 2024-12-02 PROCEDURE — 51798 US URINE CAPACITY MEASURE: CPT

## 2024-12-02 PROCEDURE — G2211 COMPLEX E/M VISIT ADD ON: CPT

## 2024-12-02 PROCEDURE — 99214 OFFICE O/P EST MOD 30 MIN: CPT

## 2025-01-09 ENCOUNTER — APPOINTMENT (OUTPATIENT)
Dept: UROLOGY | Facility: CLINIC | Age: 85
End: 2025-01-09
Payer: MEDICARE

## 2025-01-09 ENCOUNTER — OUTPATIENT (OUTPATIENT)
Dept: OUTPATIENT SERVICES | Facility: HOSPITAL | Age: 85
LOS: 1 days | End: 2025-01-09
Payer: MEDICARE

## 2025-01-09 VITALS
SYSTOLIC BLOOD PRESSURE: 168 MMHG | RESPIRATION RATE: 16 BRPM | DIASTOLIC BLOOD PRESSURE: 73 MMHG | HEART RATE: 84 BPM | TEMPERATURE: 97.6 F | OXYGEN SATURATION: 96 %

## 2025-01-09 DIAGNOSIS — R35.0 FREQUENCY OF MICTURITION: ICD-10-CM

## 2025-01-09 PROCEDURE — 52000 CYSTOURETHROSCOPY: CPT

## 2025-01-10 DIAGNOSIS — N40.1 BENIGN PROSTATIC HYPERPLASIA WITH LOWER URINARY TRACT SYMPTOMS: ICD-10-CM

## 2025-01-13 ENCOUNTER — INPATIENT (INPATIENT)
Facility: HOSPITAL | Age: 85
LOS: 1 days | Discharge: HOME | End: 2025-01-15
Attending: INTERNAL MEDICINE | Admitting: INTERNAL MEDICINE
Payer: MEDICARE

## 2025-01-13 ENCOUNTER — APPOINTMENT (OUTPATIENT)
Dept: ULTRASOUND IMAGING | Facility: IMAGING CENTER | Age: 85
End: 2025-01-13

## 2025-01-13 VITALS
DIASTOLIC BLOOD PRESSURE: 74 MMHG | SYSTOLIC BLOOD PRESSURE: 164 MMHG | TEMPERATURE: 98 F | WEIGHT: 139.99 LBS | RESPIRATION RATE: 16 BRPM | HEIGHT: 72 IN | HEART RATE: 78 BPM | OXYGEN SATURATION: 98 %

## 2025-01-13 DIAGNOSIS — J45.901 UNSPECIFIED ASTHMA WITH (ACUTE) EXACERBATION: ICD-10-CM

## 2025-01-13 LAB
ADD ON TEST-SPECIMEN IN LAB: SIGNIFICANT CHANGE UP
ALBUMIN SERPL ELPH-MCNC: 4.3 G/DL — SIGNIFICANT CHANGE UP (ref 3.3–5)
ALP SERPL-CCNC: 68 U/L — SIGNIFICANT CHANGE UP (ref 40–120)
ALT FLD-CCNC: 19 U/L — SIGNIFICANT CHANGE UP (ref 4–41)
ANION GAP SERPL CALC-SCNC: 16 MMOL/L — HIGH (ref 7–14)
APPEARANCE UR: CLEAR — SIGNIFICANT CHANGE UP
AST SERPL-CCNC: 27 U/L — SIGNIFICANT CHANGE UP (ref 4–40)
BACTERIA # UR AUTO: NEGATIVE /HPF — SIGNIFICANT CHANGE UP
BASOPHILS # BLD AUTO: 0.04 K/UL — SIGNIFICANT CHANGE UP (ref 0–0.2)
BASOPHILS NFR BLD AUTO: 0.3 % — SIGNIFICANT CHANGE UP (ref 0–2)
BILIRUB SERPL-MCNC: 0.8 MG/DL — SIGNIFICANT CHANGE UP (ref 0.2–1.2)
BILIRUB UR-MCNC: NEGATIVE — SIGNIFICANT CHANGE UP
BLOOD GAS VENOUS COMPREHENSIVE RESULT: SIGNIFICANT CHANGE UP
BUN SERPL-MCNC: 34 MG/DL — HIGH (ref 7–23)
CALCIUM SERPL-MCNC: 9.3 MG/DL — SIGNIFICANT CHANGE UP (ref 8.4–10.5)
CAST: 1 /LPF — SIGNIFICANT CHANGE UP (ref 0–4)
CHLORIDE SERPL-SCNC: 100 MMOL/L — SIGNIFICANT CHANGE UP (ref 98–107)
CO2 SERPL-SCNC: 18 MMOL/L — LOW (ref 22–31)
COLOR SPEC: YELLOW — SIGNIFICANT CHANGE UP
CREAT SERPL-MCNC: 1.61 MG/DL — HIGH (ref 0.5–1.3)
DIFF PNL FLD: NEGATIVE — SIGNIFICANT CHANGE UP
EGFR: 42 ML/MIN/1.73M2 — LOW
EOSINOPHIL # BLD AUTO: 0.1 K/UL — SIGNIFICANT CHANGE UP (ref 0–0.5)
EOSINOPHIL NFR BLD AUTO: 0.8 % — SIGNIFICANT CHANGE UP (ref 0–6)
FLUAV AG NPH QL: SIGNIFICANT CHANGE UP
FLUBV AG NPH QL: SIGNIFICANT CHANGE UP
GLUCOSE SERPL-MCNC: 96 MG/DL — SIGNIFICANT CHANGE UP (ref 70–99)
GLUCOSE UR QL: NEGATIVE MG/DL — SIGNIFICANT CHANGE UP
HCT VFR BLD CALC: 39.7 % — SIGNIFICANT CHANGE UP (ref 39–50)
HGB BLD-MCNC: 12.4 G/DL — LOW (ref 13–17)
IANC: 9.67 K/UL — HIGH (ref 1.8–7.4)
IMM GRANULOCYTES NFR BLD AUTO: 0.4 % — SIGNIFICANT CHANGE UP (ref 0–0.9)
KETONES UR-MCNC: NEGATIVE MG/DL — SIGNIFICANT CHANGE UP
LEUKOCYTE ESTERASE UR-ACNC: ABNORMAL
LYMPHOCYTES # BLD AUTO: 1.82 K/UL — SIGNIFICANT CHANGE UP (ref 1–3.3)
LYMPHOCYTES # BLD AUTO: 13.9 % — SIGNIFICANT CHANGE UP (ref 13–44)
MCHC RBC-ENTMCNC: 28.3 PG — SIGNIFICANT CHANGE UP (ref 27–34)
MCHC RBC-ENTMCNC: 31.2 G/DL — LOW (ref 32–36)
MCV RBC AUTO: 90.6 FL — SIGNIFICANT CHANGE UP (ref 80–100)
MONOCYTES # BLD AUTO: 1.45 K/UL — HIGH (ref 0–0.9)
MONOCYTES NFR BLD AUTO: 11 % — SIGNIFICANT CHANGE UP (ref 2–14)
NEUTROPHILS # BLD AUTO: 9.67 K/UL — HIGH (ref 1.8–7.4)
NEUTROPHILS NFR BLD AUTO: 73.6 % — SIGNIFICANT CHANGE UP (ref 43–77)
NITRITE UR-MCNC: NEGATIVE — SIGNIFICANT CHANGE UP
NRBC # BLD: 0 /100 WBCS — SIGNIFICANT CHANGE UP (ref 0–0)
NRBC # FLD: 0 K/UL — SIGNIFICANT CHANGE UP (ref 0–0)
NT-PROBNP SERPL-SCNC: 724 PG/ML — HIGH
PH UR: 5.5 — SIGNIFICANT CHANGE UP (ref 5–8)
PLATELET # BLD AUTO: 169 K/UL — SIGNIFICANT CHANGE UP (ref 150–400)
POTASSIUM SERPL-MCNC: 4.6 MMOL/L — SIGNIFICANT CHANGE UP (ref 3.5–5.3)
POTASSIUM SERPL-SCNC: 4.6 MMOL/L — SIGNIFICANT CHANGE UP (ref 3.5–5.3)
PROT SERPL-MCNC: 7.6 G/DL — SIGNIFICANT CHANGE UP (ref 6–8.3)
PROT UR-MCNC: 100 MG/DL
RBC # BLD: 4.38 M/UL — SIGNIFICANT CHANGE UP (ref 4.2–5.8)
RBC # FLD: 12.8 % — SIGNIFICANT CHANGE UP (ref 10.3–14.5)
RBC CASTS # UR COMP ASSIST: 0 /HPF — SIGNIFICANT CHANGE UP (ref 0–4)
RSV RNA NPH QL NAA+NON-PROBE: SIGNIFICANT CHANGE UP
SARS-COV-2 RNA SPEC QL NAA+PROBE: SIGNIFICANT CHANGE UP
SODIUM SERPL-SCNC: 134 MMOL/L — LOW (ref 135–145)
SP GR SPEC: 1.02 — SIGNIFICANT CHANGE UP (ref 1–1.03)
SQUAMOUS # UR AUTO: 1 /HPF — SIGNIFICANT CHANGE UP (ref 0–5)
TROPONIN T, HIGH SENSITIVITY RESULT: 33 NG/L — SIGNIFICANT CHANGE UP
UROBILINOGEN FLD QL: 0.2 MG/DL — SIGNIFICANT CHANGE UP (ref 0.2–1)
WBC # BLD: 13.13 K/UL — HIGH (ref 3.8–10.5)
WBC # FLD AUTO: 13.13 K/UL — HIGH (ref 3.8–10.5)
WBC UR QL: 7 /HPF — HIGH (ref 0–5)

## 2025-01-13 PROCEDURE — 99285 EMERGENCY DEPT VISIT HI MDM: CPT | Mod: GC

## 2025-01-13 PROCEDURE — 99233 SBSQ HOSP IP/OBS HIGH 50: CPT

## 2025-01-13 PROCEDURE — 71046 X-RAY EXAM CHEST 2 VIEWS: CPT | Mod: 26

## 2025-01-13 PROCEDURE — 99223 1ST HOSP IP/OBS HIGH 75: CPT

## 2025-01-13 RX ORDER — METHYLPREDNISOLONE 4 MG/1
125 TABLET ORAL ONCE
Refills: 0 | Status: COMPLETED | OUTPATIENT
Start: 2025-01-13 | End: 2025-01-13

## 2025-01-13 RX ORDER — MAGNESIUM SULFATE 500 MG/ML
2 INJECTION, SOLUTION INTRAMUSCULAR; INTRAVENOUS ONCE
Refills: 0 | Status: COMPLETED | OUTPATIENT
Start: 2025-01-13 | End: 2025-01-13

## 2025-01-13 RX ORDER — IPRATROPIUM BROMIDE AND ALBUTEROL SULFATE .5; 2.5 MG/3ML; MG/3ML
3 SOLUTION RESPIRATORY (INHALATION) ONCE
Refills: 0 | Status: DISCONTINUED | OUTPATIENT
Start: 2025-01-13 | End: 2025-01-13

## 2025-01-13 RX ORDER — IPRATROPIUM BROMIDE AND ALBUTEROL SULFATE .5; 2.5 MG/3ML; MG/3ML
3 SOLUTION RESPIRATORY (INHALATION)
Refills: 0 | Status: COMPLETED | OUTPATIENT
Start: 2025-01-13 | End: 2025-01-13

## 2025-01-13 RX ORDER — ALBUTEROL SULFATE 90 UG/1
2.5 INHALANT RESPIRATORY (INHALATION) EVERY 6 HOURS
Refills: 0 | Status: DISCONTINUED | OUTPATIENT
Start: 2025-01-13 | End: 2025-01-14

## 2025-01-13 RX ORDER — IPRATROPIUM BROMIDE AND ALBUTEROL SULFATE .5; 2.5 MG/3ML; MG/3ML
3 SOLUTION RESPIRATORY (INHALATION)
Refills: 0 | Status: DISCONTINUED | OUTPATIENT
Start: 2025-01-13 | End: 2025-01-13

## 2025-01-13 RX ORDER — ACETAMINOPHEN 80 MG/.8ML
1000 SOLUTION/ DROPS ORAL ONCE
Refills: 0 | Status: COMPLETED | OUTPATIENT
Start: 2025-01-13 | End: 2025-01-13

## 2025-01-13 RX ORDER — SODIUM CHLORIDE 9 MG/ML
500 INJECTION, SOLUTION INTRAMUSCULAR; INTRAVENOUS; SUBCUTANEOUS ONCE
Refills: 0 | Status: COMPLETED | OUTPATIENT
Start: 2025-01-13 | End: 2025-01-13

## 2025-01-13 RX ORDER — ASPIRIN 81 MG
162 TABLET, DELAYED RELEASE (ENTERIC COATED) ORAL ONCE
Refills: 0 | Status: COMPLETED | OUTPATIENT
Start: 2025-01-13 | End: 2025-01-13

## 2025-01-13 RX ORDER — CEFTRIAXONE SODIUM 1 G/1
1000 INJECTION, POWDER, FOR SOLUTION INTRAMUSCULAR; INTRAVENOUS ONCE
Refills: 0 | Status: COMPLETED | OUTPATIENT
Start: 2025-01-13 | End: 2025-01-13

## 2025-01-13 RX ADMIN — IPRATROPIUM BROMIDE AND ALBUTEROL SULFATE 3 MILLILITER(S): .5; 2.5 SOLUTION RESPIRATORY (INHALATION) at 14:15

## 2025-01-13 RX ADMIN — METHYLPREDNISOLONE 125 MILLIGRAM(S): 4 TABLET ORAL at 13:56

## 2025-01-13 RX ADMIN — IPRATROPIUM BROMIDE AND ALBUTEROL SULFATE 3 MILLILITER(S): .5; 2.5 SOLUTION RESPIRATORY (INHALATION) at 14:55

## 2025-01-13 RX ADMIN — ACETAMINOPHEN 400 MILLIGRAM(S): 80 SOLUTION/ DROPS ORAL at 13:56

## 2025-01-13 RX ADMIN — CEFTRIAXONE SODIUM 100 MILLIGRAM(S): 1 INJECTION, POWDER, FOR SOLUTION INTRAMUSCULAR; INTRAVENOUS at 20:24

## 2025-01-13 RX ADMIN — ALBUTEROL SULFATE 2.5 MILLIGRAM(S): 90 INHALANT RESPIRATORY (INHALATION) at 22:57

## 2025-01-13 RX ADMIN — Medication 162 MILLIGRAM(S): at 13:56

## 2025-01-13 RX ADMIN — SODIUM CHLORIDE 500 MILLILITER(S): 9 INJECTION, SOLUTION INTRAMUSCULAR; INTRAVENOUS; SUBCUTANEOUS at 13:00

## 2025-01-13 RX ADMIN — ACETAMINOPHEN 1000 MILLIGRAM(S): 80 SOLUTION/ DROPS ORAL at 18:03

## 2025-01-13 RX ADMIN — SODIUM CHLORIDE 500 MILLILITER(S): 9 INJECTION, SOLUTION INTRAMUSCULAR; INTRAVENOUS; SUBCUTANEOUS at 13:56

## 2025-01-13 RX ADMIN — IPRATROPIUM BROMIDE AND ALBUTEROL SULFATE 3 MILLILITER(S): .5; 2.5 SOLUTION RESPIRATORY (INHALATION) at 14:35

## 2025-01-13 RX ADMIN — MAGNESIUM SULFATE 150 GRAM(S): 500 INJECTION, SOLUTION INTRAMUSCULAR; INTRAVENOUS at 13:56

## 2025-01-13 NOTE — ED PROVIDER NOTE - CLINICAL SUMMARY MEDICAL DECISION MAKING FREE TEXT BOX
Harry, PGY1: Harry, PGY1: 84-year-old male past medical history of asthma, HTN presents with almost 1 week of cough, fever, SOB, CP with coughing.  Patient has been using his inhaler with minimal relief of symptoms.  Endorses dysuria is followed by urology for prostate issues.  Denies sore throat, abdominal pain, diarrhea, constipation.  Overall feels weak and endorses runny nose.  Patient is afebrile patient is afebrile upon presentation.  Exam is notable for inspiratory and expiratory wheezes throughout both lungs with normal heart sounds.  Abdomen is nontender.  Presentation is likely secondary to asthma exacerbation given wheezing on exam possibly secondary to viral infection we will give DuoNeb steroids and magnesium for asthma.  Get chest x-ray to evaluate for PNA.  Get UA to evaluate for UTI.  Dispo pending results and symptomatic control.  Patient continues to be short of breath may require admission.

## 2025-01-13 NOTE — H&P ADULT - PROBLEM SELECTOR PLAN 1
s/p duonebs  s/p solumedrol  s/p Mg    CXR clear Per patient, not responding to rescue inhaler. In the ED, received duonebs, solumedrol, Mg with resolution of wheezing and SOB. CXR clear. When evaluated by primary team, comfortable on RA and no wheezing on exam.    - c/w duonebs PRN wheezing  - f/u work up for infectious etiology including bacterial vs viral source  - f/u full RVP -> negative

## 2025-01-13 NOTE — H&P ADULT - NSHPSOCIALHISTORY_GEN_ALL_CORE
Lives alone, wife passed 1 year ago. Not currently working. Has a son who is involved in his medical care.

## 2025-01-13 NOTE — H&P ADULT - PROBLEM SELECTOR PLAN 6
Diet: consistent carb  DVT PPx: SCDs (IMPROVEDD score = 1)  Dispo: pending clinical improvement  Code status: FULL CODE

## 2025-01-13 NOTE — H&P ADULT - PROBLEM SELECTOR PLAN 3
-K&B US Per patient, was pending K&B US with urologist but was unable to attend appointment. Takes home medication likely tamsulosin however was unable to confirm with patient.    - confirm home medication and dose then start  - K&B US for evaluation of retention/hydronephrosis Per patient, was pending K&B US with urologist but was unable to attend appointment. Takes home medication likely tamsulosin however was unable to confirm with patient.    - confirm home medication and dose then start  - Bladder scan -> if retaining consider K&B US

## 2025-01-13 NOTE — ED ADULT TRIAGE NOTE - CHIEF COMPLAINT QUOTE
Pt arrives ambulatory c/o chest pain, SOB and fevers x3 days. Reports worsening symptoms this morning. Denies any N/V/D. PMHx asthma, HTN.

## 2025-01-13 NOTE — H&P ADULT - PROBLEM SELECTOR PLAN 2
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 Now UA+.    - s/p 1g Cftx PMHx ESBL on UCx in 5/2018 treated with ertapenem. Now UA+. Follows with urologist for evaluation of BPH, including pending K&B US per patient.    - s/p 1g Cftx  - c/w abx  - If febrile, BCx  - K&B US to evaluate for retention/hydronephrosis PMHx ESBL on UCx in 5/2018 treated with ertapenem. Now UA+. Follows with urologist for evaluation of BPH, including pending K&B US per patient.    - s/p 1g Cftx  - If febrile, BCx, UCx  - Bladder scan -> if retaining consider K&B US

## 2025-01-13 NOTE — ED PROVIDER NOTE - NSICDXPASTMEDICALHX_GEN_ALL_CORE_FT
PAST MEDICAL HISTORY:  Asthma     CVA (cerebral infarction)     Diabetes     High cholesterol     Hypertension

## 2025-01-13 NOTE — PHARMACOTHERAPY INTERVENTION NOTE - COMMENTS
Medication history is incomplete. Outpatient pharmacy currently closed, will follow up once reopens. Please call spectra [phone] if you have any questions.

## 2025-01-13 NOTE — H&P ADULT - HISTORY OF PRESENT ILLNESS
84M PMHx asthma, BPH, HLD, HTN presenting with SOB. Per patient, he developed a cough productive of white/yellowish sputum approximately 2 nights ago (1/11). Reported subjective fevers at this time however upon taking his temperature notes it was not elevated. The night prior to admission he developed shortness of breath with exertion along with wheezing not responding to his rescue inhaler. On 1/13 he was set to go for an appointment his urologist for evaluation of kidneys/bladder via imaging however was unable to make the appointment due to shortness of breath, instead came to the hospital. Per patient he has long standing issues with urination related to BPH requiring frequent overnight visits to the bathroom. Endorses he is still urinating. Denies pain/burning with urination however endorses occasional flank pain.. He denies any recent sick contacts or travel. Denies any pain with breathing or hemoptysis. Denies any history of blood clots. Denies any issues with laying flat, new weight gain, new swelling in lower extremities. Denies N/V/Diarrhea/constipation. Per chart review patient was mentioned to have history of CVA in 1998 however denied during interview. Also denied history of diabetes which was noted in his chart.    ED: VSS afebrile, physical exam notable for bilateral inspiratory and expiratory wheezes. Labs notable for WBC 13.1, BUN/Cr 34/1.61 (previous nephrology notes from 5/2018 states baseline is 1.5-1.6, c/w CKD stage 3). Trop wnl. ProBNP 724. VBG with pH 7.28, lactate 1.1, pCO2 49, HOC3 23. UA weakly positive. RVP negative. CXR with clear lung fields. Received duonebs, Mg, solumedrol, 1g ceftriaxone x1, 500cc NS bolus, tylenol for fever.    Upon evaluation by primary team in ED, pt s/p duonebs/Mg/Solumedrol/Cftx and without wheezing on exam, off O2. States feels improved.

## 2025-01-13 NOTE — H&P ADULT - ASSESSMENT
84M PMHx Asthma, BPH, HTN, HLD, possible history T2DM, prior CVA without deficits, presenting following 1 day of SOB with wheezing not responding to albuterol rescue inhaler, found to have positive UA, now s/p treatment for wheezing with duonebs/Mg/Solumedrol and 1g cftx for possible UTI i/s/o BPH. 84M PMHx Asthma, BPH, HTN, HLD, possible history T2DM, prior CVA without deficits, presenting following 1 day of SOB with wheezing not responding to albuterol rescue inhaler, found to have positive UA, now s/p treatment for wheezing with duonebs/Mg/Solumedrol and 1g cftx for possible UTI i/s/o BPH. Admitted to medicine for further management and monitoring. Detailed plan as below:

## 2025-01-13 NOTE — H&P ADULT - NSHPLABSRESULTS_GEN_ALL_CORE
LABS:                        12.4   . )-----------( 169      ( 2025 13:09 )             39.7     WBC trend: .13 <--        134[L]  |  100  |  34[H]  ----------------------------<  96  4.6   |  18[L]  |  1.61[H]    Ca    9.3      2025 13:09    TPro  7.6  /  Alb  4.3  /  TBili  0.8  /  DBili  x   /  AST  27  /  ALT  19  /  AlkPhos  68      Creatinine trend: 1.61<--      13:09 - VBG - pH: 7.28  | pCO2: 49    | pO2: 43    | Lactate: 1.1        Urinalysis Basic - ( 2025 17:50 )    Color: Yellow / Appearance: Clear / S.016 / pH: x  Gluc: x / Ketone: Negative mg/dL  / Bili: Negative / Urobili: 0.2 mg/dL   Blood: x / Protein: 100 mg/dL / Nitrite: Negative   Leuk Esterase: Trace / RBC: 0 /HPF / WBC 7 /HPF   Sq Epi: x / Non Sq Epi: 1 /HPF / Bacteria: Negative /HPF LABS:                        12.4   . )-----------( 169      ( 2025 13:09 )             39.7     WBC trend: . <--        134[L]  |  100  |  34[H]  ----------------------------<  96  4.6   |  18[L]  |  1.61[H]    Ca    9.3      2025 13:09    TPro  7.6  /  Alb  4.3  /  TBili  0.8  /  DBili  x   /  AST  27  /  ALT  19  /  AlkPhos  68      Creatinine trend: 1.61<--      13:09 - VBG - pH: 7.28  | pCO2: 49    | pO2: 43    | Lactate: 1.1        Urinalysis Basic - ( 2025 17:50 )    Color: Yellow / Appearance: Clear / S.016 / pH: x  Gluc: x / Ketone: Negative mg/dL  / Bili: Negative / Urobili: 0.2 mg/dL   Blood: x / Protein: 100 mg/dL / Nitrite: Negative   Leuk Esterase: Trace / RBC: 0 /HPF / WBC 7 /HPF   Sq Epi: x / Non Sq Epi: 1 /HPF / Bacteria: Negative /HPF    Urine Microscopic-Add On (NC) (25 @ 17:50)   Cast: 1 /LPF  Epithelial Cells: 1 /HPF  Red Blood Cell - Urine: 0 /HPF  White Blood Cell - Urine: 7 /HPF  Bacteria: Negative /HPF      Respiratory Viral Panel with COVID-19 by TOMAS (25 @ 13:35)   Rapid RVP Result: NotDetec  SARS-CoV-2: NotDetec  Adenovirus (RapRVP): NotDetec  Influenza A (RapRVP): NotDetec  Influenza B (RapRVP): NotDetec  Parainfluenza 1 (RapRVP): NotDetec  Parainfluenza 2 (RapRVP): NotDetec  Parainfluenza 3 (RapRVP): NotDetec  Parainfluenza 4 (RapRVP): NotDetec  Resp Syncytial Virus (RapRVP): NotDetec  Bordetella pertussis (RapRVP): NotDetec  Bordetella parapertussis (RapRVP): NotDetec  Chlamydia pneumoniae (RapRVP): NotDetec  Mycoplasma pneumoniae (RapRVP): NotDetec  Entero/Rhinovirus (RapRVP): NotDetec  HKU1 Coronavirus (RapRVP): NotDetec  NL63 Coronavirus (RapRVP): NotDetec  229E Coronavirus (RapRVP): NotDetec  OC43 Coronavirus (RapRVP): NotDetec  hMPV (RapRVP): NotDetec        IMAGING:  < from: Xray Chest 2 Views PA/Lat (25 @ 14:18) >    FINDINGS:    The heart size is normal.  Stable left midlung calcified granuloma. The lungs are otherwise clear.  There is no pneumothorax or pleural effusion.    IMPRESSION:  Clear lungs.    < end of copied text >

## 2025-01-13 NOTE — ED PROVIDER NOTE - ATTENDING CONTRIBUTION TO CARE
83 yo M pmhx hx HTN, asthma, presenting with complaints of cough, fevers, chest pain, shortness of breath for over one week. Chest pain is similar to pain he has had in the past from asthma exacerbations. He has been using inhalers without relief. Did not take any fever reducers today (afebrile in the ED) but reports subjective fevers last night. Denies leg pain/swelling. No exertional symptoms. No reported sick contacts.  Pt well appearing, speaking in full sentences, no respiratory distress but with inspiratory and expiratory wheezing b/l and LEGGETT. Heart rrr. No LE edema or ttp. Plan for labs, ekg, chest xray, duonebs, fluids, flu/covid swab and reassess. Consider viral uri, bronchitis, superimposed bacterial pneumonia s/p viral uri, asthma exacerbation. Reassess after meds, admit if still with sob/leggett vs dc home if work up unremarkable and improved sx.

## 2025-01-13 NOTE — H&P ADULT - NSHPPHYSICALEXAM_GEN_ALL_CORE
GENERAL: NAD, lying in bed comfortably, conversational  HEAD:  Atraumatic, normocephalic  EYES: EOMI, PERRLA, conjunctiva and sclera clear  NECK: Supple, trachea midline, no JVD  HEART: +S1/S2, RRR, no murmurs, rubs, or gallops  LUNGS: Unlabored respirations. CTA b/l, no crackles, wheezing, or rhonchi  ABDOMEN: Soft, NTND, +BS. No masses or hernia palpated  EXTREMITIES: 2+ peripheral pulses bilaterally. No clubbing, cyanosis, or edema  MSK: no gross deformity in extremities, no step off or deformity in C/T/L spine, normal muscle strength/tone  NERVOUS SYSTEM: CN II-XII intact, A&Ox3, moving all extremities spontaneously, no focal deficits, sensation intact and equal in b/l extremities  SKIN: No rashes or lesions GENERAL: NAD, lying in bed comfortably, conversational. Not currently on O2.  HEAD:  Atraumatic, normocephalic  EYES: EOMI, PERRLA, conjunctiva and sclera clear  NECK: Supple, trachea midline, no JVD  HEART: +S1/S2, RRR, no murmurs, rubs, or gallops  LUNGS: Unlabored respirations. CTA b/l, no crackles, wheezing, or rhonchi  ABDOMEN: Soft, NTND, +BS. No masses or hernia palpated  EXTREMITIES: 2+ peripheral pulses bilaterally. No clubbing, cyanosis, or edema  MSK: no gross deformity in extremities, normal muscle strength/tone  NERVOUS SYSTEM: A&Ox3, moving all extremities spontaneously, sensation intact and equal in b/l extremities

## 2025-01-13 NOTE — H&P ADULT - NSHPREVIEWOFSYSTEMS_GEN_ALL_CORE
REVIEW OF SYSTEMS:    CONSTITUTIONAL:  No weakness, fevers or chills  EYES/ENT:  No visual changes;  No vertigo or throat pain   NECK:  No pain or stiffness  RESPIRATORY:  No cough, wheezing, hemoptysis; No shortness of breath  CARDIOVASCULAR:  No chest pain or palpitations  GASTROINTESTINAL:  No abdominal or epigastric pain. No nausea, vomiting, or hematemesis; No diarrhea or constipation. No melena or hematochezia.  GENITOURINARY:  No dysuria, frequency or hematuria  NEUROLOGICAL:  No numbness or weakness  SKIN:  No itching, rashes CONSTITUTIONAL:  No weakness  EYES/ENT: No throat pain   RESPIRATORY:  No cough, hemoptysis  CARDIOVASCULAR:  No chest pain or palpitations  GASTROINTESTINAL:  No abdominal or epigastric pain. No nausea, vomiting. No diarrhea or constipation  GENITOURINARY:  No dysuria, or hematuria  NEUROLOGICAL:  No numbness or weakness  SKIN:  No itching, rashes

## 2025-01-13 NOTE — ED PROVIDER NOTE - PROGRESS NOTE DETAILS
Harry, PGY1: CXR wnl. UA weakly positive, will treat given dysuria. Patient has improved wheezing bilaterally after duonebs, steroids, and mag; however, patient continues to endorse SOB and has wheezing on exam. Patient lives alone and he states he has trouble getting around d/t the SOB. Patient is a CDU candidate; however, they do not have beds. Will admit for asthma exacerbation and UTI. Will redose albuterol and give CTX

## 2025-01-13 NOTE — ED ADULT NURSE REASSESSMENT NOTE - NS ED NURSE REASSESS COMMENT FT1
pt aaox4, ambulatory to bathroom. denies pain. pt tachycardic. provider made aware. will continue to monitor.

## 2025-01-13 NOTE — H&P ADULT - ATTENDING COMMENTS
I performed a history and physical exam of this patient and discussed the management with the resident listed here. I reviewed the note, agree with the documented findings and plan of care except as noted in my additions and changes below:    84M PMHx Asthma, BPH, HTN, HLD, ? T2DM, prior CVA without deficits, presenting following 1 day of SOB with wheezing not responding to albuterol rescue inhaler. Patient endorses vague UTI symptoms including urinary frequency and ?suprapubic pain found to have trace LE, 7 WBC.     #Asthma Exacerbation  ::Exacerbation possibly 2/2 URI  -CXR w/ no acute cardiopulmonary process. RVP negative  -s/p solumedrol 125 mg IV x1, Duoneb, Mag 2g x1 in ED  -Continue home inhalers  -Duoneb Q6H, wean as tolerated   -Incentive spirometry  -Guaifenesin 600 mg BID PRN for congestion    #?Urinary tract infection  ::PMHx ESBL on UCx in 5/2018 treated with ertapenem. Follows with urologist for evaluation of BPH, including pending K&B US per patient.  -c/w CTX pending cultures    #BPH  -Tamsulosin 0.4 mg QD    #Medication Management  -Patient’s son to bring medication list in the morning

## 2025-01-14 ENCOUNTER — RESULT REVIEW (OUTPATIENT)
Age: 85
End: 2025-01-14

## 2025-01-14 DIAGNOSIS — N39.0 URINARY TRACT INFECTION, SITE NOT SPECIFIED: ICD-10-CM

## 2025-01-14 DIAGNOSIS — I10 ESSENTIAL (PRIMARY) HYPERTENSION: ICD-10-CM

## 2025-01-14 DIAGNOSIS — N18.9 CHRONIC KIDNEY DISEASE, UNSPECIFIED: ICD-10-CM

## 2025-01-14 DIAGNOSIS — E78.5 HYPERLIPIDEMIA, UNSPECIFIED: ICD-10-CM

## 2025-01-14 DIAGNOSIS — N40.0 BENIGN PROSTATIC HYPERPLASIA WITHOUT LOWER URINARY TRACT SYMPTOMS: ICD-10-CM

## 2025-01-14 DIAGNOSIS — Z29.9 ENCOUNTER FOR PROPHYLACTIC MEASURES, UNSPECIFIED: ICD-10-CM

## 2025-01-14 DIAGNOSIS — J45.901 UNSPECIFIED ASTHMA WITH (ACUTE) EXACERBATION: ICD-10-CM

## 2025-01-14 DIAGNOSIS — N17.9 ACUTE KIDNEY FAILURE, UNSPECIFIED: ICD-10-CM

## 2025-01-14 LAB
ADD ON TEST-SPECIMEN IN LAB: SIGNIFICANT CHANGE UP
ALBUMIN SERPL ELPH-MCNC: 3.9 G/DL — SIGNIFICANT CHANGE UP (ref 3.3–5)
ALP SERPL-CCNC: 63 U/L — SIGNIFICANT CHANGE UP (ref 40–120)
ALT FLD-CCNC: 19 U/L — SIGNIFICANT CHANGE UP (ref 4–41)
ANION GAP SERPL CALC-SCNC: 16 MMOL/L — HIGH (ref 7–14)
ANISOCYTOSIS BLD QL: SLIGHT — SIGNIFICANT CHANGE UP
AST SERPL-CCNC: 22 U/L — SIGNIFICANT CHANGE UP (ref 4–40)
BASOPHILS # BLD AUTO: 0 K/UL — SIGNIFICANT CHANGE UP (ref 0–0.2)
BASOPHILS NFR BLD AUTO: 0 % — SIGNIFICANT CHANGE UP (ref 0–2)
BILIRUB SERPL-MCNC: 0.3 MG/DL — SIGNIFICANT CHANGE UP (ref 0.2–1.2)
BUN SERPL-MCNC: 36 MG/DL — HIGH (ref 7–23)
CALCIUM SERPL-MCNC: 9 MG/DL — SIGNIFICANT CHANGE UP (ref 8.4–10.5)
CHLORIDE SERPL-SCNC: 99 MMOL/L — SIGNIFICANT CHANGE UP (ref 98–107)
CO2 SERPL-SCNC: 18 MMOL/L — LOW (ref 22–31)
CREAT SERPL-MCNC: 1.39 MG/DL — HIGH (ref 0.5–1.3)
EGFR: 50 ML/MIN/1.73M2 — LOW
EOSINOPHIL # BLD AUTO: 0 K/UL — SIGNIFICANT CHANGE UP (ref 0–0.5)
EOSINOPHIL NFR BLD AUTO: 0 % — SIGNIFICANT CHANGE UP (ref 0–6)
GLUCOSE BLDC GLUCOMTR-MCNC: 150 MG/DL — HIGH (ref 70–99)
GLUCOSE BLDC GLUCOMTR-MCNC: 160 MG/DL — HIGH (ref 70–99)
GLUCOSE BLDC GLUCOMTR-MCNC: 161 MG/DL — HIGH (ref 70–99)
GLUCOSE SERPL-MCNC: 158 MG/DL — HIGH (ref 70–99)
HCT VFR BLD CALC: 37.6 % — LOW (ref 39–50)
HGB BLD-MCNC: 12.1 G/DL — LOW (ref 13–17)
IANC: 10.48 K/UL — HIGH (ref 1.8–7.4)
LYMPHOCYTES # BLD AUTO: 0.56 K/UL — LOW (ref 1–3.3)
LYMPHOCYTES # BLD AUTO: 5 % — LOW (ref 13–44)
MAGNESIUM SERPL-MCNC: 2.2 MG/DL — SIGNIFICANT CHANGE UP (ref 1.6–2.6)
MANUAL SMEAR VERIFICATION: SIGNIFICANT CHANGE UP
MCHC RBC-ENTMCNC: 28.6 PG — SIGNIFICANT CHANGE UP (ref 27–34)
MCHC RBC-ENTMCNC: 32.2 G/DL — SIGNIFICANT CHANGE UP (ref 32–36)
MCV RBC AUTO: 88.9 FL — SIGNIFICANT CHANGE UP (ref 80–100)
MONOCYTES # BLD AUTO: 0.56 K/UL — SIGNIFICANT CHANGE UP (ref 0–0.9)
MONOCYTES NFR BLD AUTO: 5 % — SIGNIFICANT CHANGE UP (ref 2–14)
NEUTROPHILS # BLD AUTO: 10.15 K/UL — HIGH (ref 1.8–7.4)
NEUTROPHILS NFR BLD AUTO: 90 % — HIGH (ref 43–77)
NRBC # BLD: 0 /100 WBCS — SIGNIFICANT CHANGE UP (ref 0–0)
PHOSPHATE SERPL-MCNC: 3.5 MG/DL — SIGNIFICANT CHANGE UP (ref 2.5–4.5)
PLAT MORPH BLD: NORMAL — SIGNIFICANT CHANGE UP
PLATELET # BLD AUTO: 175 K/UL — SIGNIFICANT CHANGE UP (ref 150–400)
PLATELET COUNT - ESTIMATE: NORMAL — SIGNIFICANT CHANGE UP
POTASSIUM SERPL-MCNC: 4.4 MMOL/L — SIGNIFICANT CHANGE UP (ref 3.5–5.3)
POTASSIUM SERPL-SCNC: 4.4 MMOL/L — SIGNIFICANT CHANGE UP (ref 3.5–5.3)
PROT SERPL-MCNC: 6.9 G/DL — SIGNIFICANT CHANGE UP (ref 6–8.3)
RBC # BLD: 4.23 M/UL — SIGNIFICANT CHANGE UP (ref 4.2–5.8)
RBC # FLD: 12.6 % — SIGNIFICANT CHANGE UP (ref 10.3–14.5)
RBC BLD AUTO: NORMAL — SIGNIFICANT CHANGE UP
SODIUM SERPL-SCNC: 133 MMOL/L — LOW (ref 135–145)
WBC # BLD: 11.28 K/UL — HIGH (ref 3.8–10.5)
WBC # FLD AUTO: 11.28 K/UL — HIGH (ref 3.8–10.5)

## 2025-01-14 PROCEDURE — 99232 SBSQ HOSP IP/OBS MODERATE 35: CPT

## 2025-01-14 PROCEDURE — 93306 TTE W/DOPPLER COMPLETE: CPT | Mod: 26

## 2025-01-14 PROCEDURE — 76770 US EXAM ABDO BACK WALL COMP: CPT | Mod: 26

## 2025-01-14 RX ORDER — SODIUM CHLORIDE 9 MG/ML
1000 INJECTION, SOLUTION INTRAVENOUS
Refills: 0 | Status: DISCONTINUED | OUTPATIENT
Start: 2025-01-14 | End: 2025-01-15

## 2025-01-14 RX ORDER — INSULIN GLARGINE-YFGN 100 [IU]/ML
5 INJECTION, SOLUTION SUBCUTANEOUS AT BEDTIME
Refills: 0 | Status: DISCONTINUED | OUTPATIENT
Start: 2025-01-14 | End: 2025-01-14

## 2025-01-14 RX ORDER — TAMSULOSIN HYDROCHLORIDE 0.4 MG/1
0.4 CAPSULE ORAL AT BEDTIME
Refills: 0 | Status: DISCONTINUED | OUTPATIENT
Start: 2025-01-14 | End: 2025-01-15

## 2025-01-14 RX ORDER — INSULIN LISPRO 100/ML
VIAL (ML) SUBCUTANEOUS
Refills: 0 | Status: DISCONTINUED | OUTPATIENT
Start: 2025-01-14 | End: 2025-01-14

## 2025-01-14 RX ORDER — TAMSULOSIN HYDROCHLORIDE 0.4 MG/1
1 CAPSULE ORAL
Refills: 0 | DISCHARGE

## 2025-01-14 RX ORDER — INSULIN GLARGINE-YFGN 100 [IU]/ML
8 INJECTION, SOLUTION SUBCUTANEOUS
Refills: 0 | DISCHARGE

## 2025-01-14 RX ORDER — ALBUTEROL SULFATE 90 UG/1
2 INHALANT RESPIRATORY (INHALATION)
Refills: 0 | DISCHARGE

## 2025-01-14 RX ORDER — ACETAMINOPHEN 80 MG/.8ML
650 SOLUTION/ DROPS ORAL EVERY 6 HOURS
Refills: 0 | Status: DISCONTINUED | OUTPATIENT
Start: 2025-01-14 | End: 2025-01-15

## 2025-01-14 RX ORDER — APIXABAN 5 MG/1
1 TABLET, FILM COATED ORAL
Refills: 0 | DISCHARGE

## 2025-01-14 RX ORDER — PANTOPRAZOLE 40 MG/1
40 TABLET, DELAYED RELEASE ORAL
Refills: 0 | Status: DISCONTINUED | OUTPATIENT
Start: 2025-01-14 | End: 2025-01-15

## 2025-01-14 RX ORDER — VITAMIN A 10000 UNIT
1 TABLET ORAL
Refills: 0 | DISCHARGE

## 2025-01-14 RX ORDER — DEXTROSE MONOHYDRATE 25 G/50ML
25 INJECTION, SOLUTION INTRAVENOUS ONCE
Refills: 0 | Status: DISCONTINUED | OUTPATIENT
Start: 2025-01-14 | End: 2025-01-15

## 2025-01-14 RX ORDER — IPRATROPIUM BROMIDE AND ALBUTEROL SULFATE .5; 2.5 MG/3ML; MG/3ML
3 SOLUTION RESPIRATORY (INHALATION) EVERY 6 HOURS
Refills: 0 | Status: DISCONTINUED | OUTPATIENT
Start: 2025-01-14 | End: 2025-01-14

## 2025-01-14 RX ORDER — CEFTRIAXONE SODIUM 1 G/1
1000 INJECTION, POWDER, FOR SOLUTION INTRAMUSCULAR; INTRAVENOUS EVERY 24 HOURS
Refills: 0 | Status: DISCONTINUED | OUTPATIENT
Start: 2025-01-14 | End: 2025-01-14

## 2025-01-14 RX ORDER — GLUCAGON INJECTION, SOLUTION 0.5 MG/.1ML
1 INJECTION, SOLUTION SUBCUTANEOUS ONCE
Refills: 0 | Status: DISCONTINUED | OUTPATIENT
Start: 2025-01-14 | End: 2025-01-15

## 2025-01-14 RX ORDER — ATORVASTATIN CALCIUM 40 MG/1
1 TABLET, FILM COATED ORAL
Refills: 0 | DISCHARGE

## 2025-01-14 RX ORDER — HEPARIN SODIUM 1000 [USP'U]/ML
5000 INJECTION, SOLUTION INTRAVENOUS; SUBCUTANEOUS EVERY 8 HOURS
Refills: 0 | Status: DISCONTINUED | OUTPATIENT
Start: 2025-01-14 | End: 2025-01-15

## 2025-01-14 RX ORDER — DEXTROSE MONOHYDRATE 25 G/50ML
15 INJECTION, SOLUTION INTRAVENOUS ONCE
Refills: 0 | Status: DISCONTINUED | OUTPATIENT
Start: 2025-01-14 | End: 2025-01-15

## 2025-01-14 RX ORDER — MIRABEGRON 50 MG/1
1 TABLET, FILM COATED, EXTENDED RELEASE ORAL
Refills: 0 | DISCHARGE

## 2025-01-14 RX ORDER — FERROUS GLUCONATE 325 MG
0 TABLET ORAL
Refills: 0 | DISCHARGE

## 2025-01-14 RX ORDER — GUAIFENESIN 100 MG/5ML
600 SYRUP ORAL EVERY 12 HOURS
Refills: 0 | Status: DISCONTINUED | OUTPATIENT
Start: 2025-01-14 | End: 2025-01-15

## 2025-01-14 RX ORDER — PREDNISONE 5 MG
40 TABLET ORAL DAILY
Refills: 0 | Status: DISCONTINUED | OUTPATIENT
Start: 2025-01-14 | End: 2025-01-15

## 2025-01-14 RX ORDER — MACITENTAN AND TADALAFIL 10; 40 MG/1; MG/1
1 TABLET, FILM COATED ORAL
Refills: 0 | DISCHARGE

## 2025-01-14 RX ORDER — RANOLAZINE 1000 MG/1
1 TABLET, FILM COATED, EXTENDED RELEASE ORAL
Refills: 0 | DISCHARGE

## 2025-01-14 RX ORDER — ATORVASTATIN CALCIUM 40 MG/1
20 TABLET, FILM COATED ORAL AT BEDTIME
Refills: 0 | Status: DISCONTINUED | OUTPATIENT
Start: 2025-01-14 | End: 2025-01-14

## 2025-01-14 RX ORDER — ALBUTEROL SULFATE 90 UG/1
0 INHALANT RESPIRATORY (INHALATION)
Qty: 0 | Refills: 0 | DISCHARGE

## 2025-01-14 RX ORDER — SACUBITRIL AND VALSARTAN 24; 26 MG/1; MG/1
1 TABLET, FILM COATED ORAL
Refills: 0 | DISCHARGE

## 2025-01-14 RX ORDER — FUROSEMIDE 20 MG
1 TABLET ORAL
Refills: 0 | DISCHARGE

## 2025-01-14 RX ORDER — DULAGLUTIDE 3 MG/.5ML
1.5 INJECTION, SOLUTION SUBCUTANEOUS
Refills: 0 | DISCHARGE

## 2025-01-14 RX ORDER — ALBUTEROL SULFATE 90 UG/1
2 INHALANT RESPIRATORY (INHALATION) EVERY 6 HOURS
Refills: 0 | Status: DISCONTINUED | OUTPATIENT
Start: 2025-01-14 | End: 2025-01-15

## 2025-01-14 RX ORDER — MIRABEGRON 50 MG/1
0 TABLET, FILM COATED, EXTENDED RELEASE ORAL
Refills: 0 | DISCHARGE

## 2025-01-14 RX ORDER — SERTRALINE HYDROCHLORIDE 25 MG/1
1 TABLET ORAL
Refills: 0 | DISCHARGE

## 2025-01-14 RX ORDER — METOPROLOL TARTRATE 50 MG
1 TABLET ORAL
Refills: 0 | DISCHARGE

## 2025-01-14 RX ORDER — ATORVASTATIN CALCIUM 40 MG/1
40 TABLET, FILM COATED ORAL AT BEDTIME
Refills: 0 | Status: DISCONTINUED | OUTPATIENT
Start: 2025-01-14 | End: 2025-01-15

## 2025-01-14 RX ORDER — BUDESONIDE AND FORMOTEROL FUMARATE 160; 4.5 UG/1; UG/1
2 AEROSOL, METERED RESPIRATORY (INHALATION)
Refills: 0 | DISCHARGE

## 2025-01-14 RX ORDER — ALBUTEROL SULFATE 90 UG/1
0 INHALANT RESPIRATORY (INHALATION)
Refills: 0 | DISCHARGE

## 2025-01-14 RX ADMIN — TAMSULOSIN HYDROCHLORIDE 0.4 MILLIGRAM(S): 0.4 CAPSULE ORAL at 21:33

## 2025-01-14 RX ADMIN — HEPARIN SODIUM 5000 UNIT(S): 1000 INJECTION, SOLUTION INTRAVENOUS; SUBCUTANEOUS at 21:34

## 2025-01-14 RX ADMIN — IPRATROPIUM BROMIDE AND ALBUTEROL SULFATE 3 MILLILITER(S): .5; 2.5 SOLUTION RESPIRATORY (INHALATION) at 06:16

## 2025-01-14 RX ADMIN — Medication 40 MILLIGRAM(S): at 12:18

## 2025-01-14 RX ADMIN — IPRATROPIUM BROMIDE AND ALBUTEROL SULFATE 3 MILLILITER(S): .5; 2.5 SOLUTION RESPIRATORY (INHALATION) at 10:36

## 2025-01-14 RX ADMIN — ATORVASTATIN CALCIUM 20 MILLIGRAM(S): 40 TABLET, FILM COATED ORAL at 21:34

## 2025-01-14 NOTE — PATIENT PROFILE ADULT - FALL HARM RISK - HARM RISK INTERVENTIONS

## 2025-01-14 NOTE — PROGRESS NOTE ADULT - PROBLEM SELECTOR PLAN 1
- Per patient, at home it was not responding to rescue inhaler. In the ED, received duonebs, solumedrol in the ED  - CXR with clear lungs, RVP negative, BNP 700s (wnl for the age). Trop wnl.   - pt today with improvement in sob, wheezing, and on RA.   - will continue with prednisone 40mg daily for 4 additional days to complete 5d course of steroids for asthma exacerbation.   - switch to albuterol prn for wheezing/sob  - on RA, monitor on  for now  - check TTE for further evaluation of SOB to rule out any cardiac etiology - Per patient, at home it was not responding to rescue inhaler. In the ED, received duonebs, solumedrol in the ED  - CXR with clear lungs, RVP negative, BNP 700s (wnl for the age). Trop wnl.   - pt today with improvement in sob, wheezing, and on RA.   - will continue with prednisone 40mg daily for 4 additional days to complete 5d course of steroids for asthma exacerbation.   - switch to albuterol prn for wheezing/sob  - on RA, monitor on  for now  - check TTE for further evaluation of SOB to rule out any cardiac etiology    #Leukocytosis  - elevated to 13K -> 11K  - monitor closely

## 2025-01-14 NOTE — PHARMACOTHERAPY INTERVENTION NOTE - COMMENTS
Medication history in progress.  Medication history update: Medication history is incomplete. Medication list updated in Outpatient Medication Record (OMR) per [facility]. Unable to verify patient's medication list with two sources. Patient's son unable to verify current medications, deferred to medication list from [facility]. Please see OMR for dispensing dates and quantities.     Home Medications:  albuterol 90 mcg/inh inhalation aerosol: 2 puff(s) inhaled every 6 hours as needed for  shortness of breath and/or wheezing (last dispensed 12/20/24) (14 Jan 2025 16:36)  amLODIPine 5 mg oral tablet: 1 tab(s) orally once a day (90-day supply last dispensed 11/23/24) (14 Jan 2025 16:36)  atorvastatin 40 mg oral tablet: 1 tab(s) orally once a day (90-day supply last dispensed 9/23/24) (14 Jan 2025 16:36)  benazepril-hydrochlorothiazide 10 mg-12.5 mg oral tablet: 1 tab(s) orally once a day (90-day supply last dispensed 9/24/24) (14 Jan 2025 16:36)  Myrbetriq 50 mg oral tablet, extended release: 1 tab(s) orally once a day (90-day supply last dispensed 12/20/24) (14 Jan 2025 16:36)  tamsulosin 0.4 mg oral capsule: 1 cap(s) orally once a day (90-day supply last dispensed 9/19/24) (14 Jan 2025 16:37)    Please call spectra [phone] if you have any questions. Medication history update: Medication history is incomplete. Medication list updated in Outpatient Medication Record (OMR) per [facility]. Unable to verify patient's medication list with two sources. Patient and patient's son unable to verify current medications, deferred to medication list from [facility]. Please see OMR for dispensing dates and quantities.     Home Medications:  albuterol 90 mcg/inh inhalation aerosol: 2 puff(s) inhaled every 6 hours as needed for  shortness of breath and/or wheezing (last dispensed 12/20/24) (14 Jan 2025 16:36)  amLODIPine 5 mg oral tablet: 1 tab(s) orally once a day (90-day supply last dispensed 11/23/24) (14 Jan 2025 16:36)  atorvastatin 40 mg oral tablet: 1 tab(s) orally once a day (90-day supply last dispensed 9/23/24) (14 Jan 2025 16:36)  benazepril-hydrochlorothiazide 10 mg-12.5 mg oral tablet: 1 tab(s) orally once a day (90-day supply last dispensed 9/24/24) (14 Jan 2025 16:36)  Myrbetriq 50 mg oral tablet, extended release: 1 tab(s) orally once a day (90-day supply last dispensed 12/20/24) (14 Jan 2025 16:36)  tamsulosin 0.4 mg oral capsule: 1 cap(s) orally once a day (90-day supply last dispensed 9/19/24) (14 Jan 2025 16:37)    Please call spectra [phone] if you have any questions.

## 2025-01-15 ENCOUNTER — TRANSCRIPTION ENCOUNTER (OUTPATIENT)
Age: 85
End: 2025-01-15

## 2025-01-15 VITALS
RESPIRATION RATE: 18 BRPM | HEART RATE: 90 BPM | TEMPERATURE: 98 F | SYSTOLIC BLOOD PRESSURE: 126 MMHG | OXYGEN SATURATION: 100 % | DIASTOLIC BLOOD PRESSURE: 83 MMHG

## 2025-01-15 PROBLEM — J45.909 UNSPECIFIED ASTHMA, UNCOMPLICATED: Chronic | Status: ACTIVE | Noted: 2025-01-13

## 2025-01-15 LAB
A1C WITH ESTIMATED AVERAGE GLUCOSE RESULT: 6.1 % — HIGH (ref 4–5.6)
ANION GAP SERPL CALC-SCNC: 12 MMOL/L — SIGNIFICANT CHANGE UP (ref 7–14)
BUN SERPL-MCNC: 45 MG/DL — HIGH (ref 7–23)
CALCIUM SERPL-MCNC: 8.8 MG/DL — SIGNIFICANT CHANGE UP (ref 8.4–10.5)
CHLORIDE SERPL-SCNC: 105 MMOL/L — SIGNIFICANT CHANGE UP (ref 98–107)
CO2 SERPL-SCNC: 19 MMOL/L — LOW (ref 22–31)
CREAT SERPL-MCNC: 1.44 MG/DL — HIGH (ref 0.5–1.3)
CRP SERPL-MCNC: 48.6 MG/L — HIGH
EGFR: 48 ML/MIN/1.73M2 — LOW
ERYTHROCYTE [SEDIMENTATION RATE] IN BLOOD: 23 MM/HR — HIGH (ref 1–15)
ESTIMATED AVERAGE GLUCOSE: 128 — SIGNIFICANT CHANGE UP
GLUCOSE BLDC GLUCOMTR-MCNC: 125 MG/DL — HIGH (ref 70–99)
GLUCOSE BLDC GLUCOMTR-MCNC: 138 MG/DL — HIGH (ref 70–99)
GLUCOSE SERPL-MCNC: 121 MG/DL — HIGH (ref 70–99)
HCT VFR BLD CALC: 35.8 % — LOW (ref 39–50)
HGB BLD-MCNC: 11.8 G/DL — LOW (ref 13–17)
MCHC RBC-ENTMCNC: 29.3 PG — SIGNIFICANT CHANGE UP (ref 27–34)
MCHC RBC-ENTMCNC: 33 G/DL — SIGNIFICANT CHANGE UP (ref 32–36)
MCV RBC AUTO: 88.8 FL — SIGNIFICANT CHANGE UP (ref 80–100)
NRBC # BLD: 0 /100 WBCS — SIGNIFICANT CHANGE UP (ref 0–0)
NRBC # FLD: 0 K/UL — SIGNIFICANT CHANGE UP (ref 0–0)
PLATELET # BLD AUTO: 207 K/UL — SIGNIFICANT CHANGE UP (ref 150–400)
POTASSIUM SERPL-MCNC: 5.1 MMOL/L — SIGNIFICANT CHANGE UP (ref 3.5–5.3)
POTASSIUM SERPL-SCNC: 5.1 MMOL/L — SIGNIFICANT CHANGE UP (ref 3.5–5.3)
PROCALCITONIN SERPL-MCNC: 0.12 NG/ML — HIGH (ref 0.02–0.1)
RBC # BLD: 4.03 M/UL — LOW (ref 4.2–5.8)
RBC # FLD: 12.4 % — SIGNIFICANT CHANGE UP (ref 10.3–14.5)
SODIUM SERPL-SCNC: 136 MMOL/L — SIGNIFICANT CHANGE UP (ref 135–145)
WBC # BLD: 15.74 K/UL — HIGH (ref 3.8–10.5)
WBC # FLD AUTO: 15.74 K/UL — HIGH (ref 3.8–10.5)

## 2025-01-15 PROCEDURE — 99239 HOSP IP/OBS DSCHRG MGMT >30: CPT

## 2025-01-15 RX ORDER — PANTOPRAZOLE 40 MG/1
1 TABLET, DELAYED RELEASE ORAL
Qty: 3 | Refills: 0
Start: 2025-01-15 | End: 2025-01-17

## 2025-01-15 RX ORDER — BENAZEPRIL HYDROCHLORIDE AND HYDROCHLOROTHIAZIDE 20; 12.5 MG/1; MG/1
1 TABLET ORAL
Refills: 0 | DISCHARGE

## 2025-01-15 RX ORDER — PREDNISONE 5 MG
2 TABLET ORAL
Qty: 6 | Refills: 0
Start: 2025-01-15 | End: 2025-01-17

## 2025-01-15 RX ADMIN — Medication 5 MILLIGRAM(S): at 06:39

## 2025-01-15 RX ADMIN — Medication 40 MILLIGRAM(S): at 06:39

## 2025-01-15 RX ADMIN — PANTOPRAZOLE 40 MILLIGRAM(S): 40 TABLET, DELAYED RELEASE ORAL at 06:39

## 2025-01-15 NOTE — PHYSICAL THERAPY INITIAL EVALUATION ADULT - GENERAL OBSERVATIONS, REHAB EVAL
Patient received sitting at the edge of bed, in NAD, agreeable to participate. Patient cleared to participate by MELLISSA Adrian. 96% oxygen saturation on room air. HR 82 BPM.

## 2025-01-15 NOTE — PHYSICAL THERAPY INITIAL EVALUATION ADULT - DIAGNOSIS, PT EVAL
[facility]    Physical Therapy  Cancellation/No-show Note  Patient Name:  Charan Sandhu  :  2002   Date:  2022  Cancelled visits to date: 0  No-shows to date: 1    For today's appointment patient:  []  Cancelled  []  Rescheduled appointment  [x]  No-show     Reason given by patient:  []  Patient ill  []  Conflicting appointment  []  No transportation    []  Conflict with work  []  No reason given  [x]  Other:     Comments:  pt arrived 35 minutes late, rescheduled for next week. Discussions have been had on previous dates about arriving on time or scheduling at a different time to have enough time to get here from class as pt has consistently been 10-15 minutes late to last several appointments. If late arrival/no show continues, supervisor will discuss cancel/no show policy w/ pt and refer back to club  for continued care.      Electronically signed by:  Claudia Stewart PT, PT Impaired functional mobility, asthma exacerbation

## 2025-01-15 NOTE — DISCHARGE NOTE PROVIDER - ATTENDING DISCHARGE PHYSICAL EXAMINATION:
CONSTITUTIONAL: NAD, elderly male, on RA, no tachpnea / accessory mm use noted, wearing mask   HEAD: Normocephalic, atraumatic  EYES: EOMI, PERRL, anicteric sclera  ENT: no rhinorrhea, no pharyngeal erythema, MMM, no cervical LAD  RESPIRATORY: No increased work of breathing, CTAB, no wheezes or crackles appreciated  CARDIOVASCULAR: RRR, S1 and S2 present, no m/r/g; no chest wall tenderness   ABDOMEN: soft, NT, ND, bowel sounds present  EXTREMITIES: No LE edema b/l  MUSCULOSKELETAL: no joint swelling, no tenderness to palpation  NEURO: A&Ox4, moving all extremities

## 2025-01-15 NOTE — DISCHARGE NOTE PROVIDER - NSFOLLOWUPCLINICS_GEN_ALL_ED_FT
Cardiology at [facility]  Cardiology  [address] [place], [place] [address]  Phone: [phone]  Fax:

## 2025-01-15 NOTE — DISCHARGE NOTE PROVIDER - NSDCCPCAREPLAN_GEN_ALL_CORE_FT
PRINCIPAL DISCHARGE DIAGNOSIS  Diagnosis: Acute asthma exacerbation  Assessment and Plan of Treatment: - you were treated for asthma exacberation with steroids and nebulizer. Continue taking prednisone for 3 more days to complete 5 day course of steroids. Take pantoprazole while on prednisone for prevent gastritis.   - follow up with your primary doctor within 1-2 weeks of discharge will blood work done at that time too  - should your symptoms of recur seek immediate medical attention.      SECONDARY DISCHARGE DIAGNOSES  Diagnosis: Hypertension  Assessment and Plan of Treatment: - continue amlodipine 5mg daily. Discuss with your doctor about benazipril medication    Diagnosis: Benign prostatic hyperplasia  Assessment and Plan of Treatment: - continue your home tamsulosin and mirabegon    Diagnosis: Hyperlipidemia  Assessment and Plan of Treatment: - continue your home atorvastatin 40mg daily    Diagnosis: Prediabetes  Assessment and Plan of Treatment: - Your HbA1c was elevated at 5.9. monitor your HbA1c with your doctor.  - make lifestyle and dietary changes with DASH Diet

## 2025-01-15 NOTE — DISCHARGE NOTE PROVIDER - DETAILS OF MALNUTRITION DIAGNOSIS/DIAGNOSES
This patient has been assessed with a concern for Malnutrition and was treated during this hospitalization for the following Nutrition diagnosis/diagnoses:     -  01/15/2025: Moderate protein-calorie malnutrition

## 2025-01-15 NOTE — PROGRESS NOTE ADULT - PROBLEM SELECTOR PLAN 3
- Hx of CKD with baseline Cr 1.4-1.6 range  - Creatinine currently at goal    #Mild Hyponatremia  - Na 134 -> 133  - encourage PO intake  - monitor closely     #Prediabetes  - A1c 5.9  - lifestyle modifications
- Hx of CKD with baseline Cr 1.4-1.6 range  - Creatinine currently at goal    #Mild Hyponatremia - resolved  - Na 134 -> 133 -> 137  - encourage PO intake    #Prediabetes  - A1c 5.9  - lifestyle modifications

## 2025-01-15 NOTE — PHYSICAL THERAPY INITIAL EVALUATION ADULT - GAIT DEVIATIONS NOTED, PT EVAL
decreased lenka/decreased velocity of limb motion/decreased step length/decreased stride length/decreased weight-shifting ability

## 2025-01-15 NOTE — DISCHARGE NOTE PROVIDER - NSDCMRMEDTOKEN_GEN_ALL_CORE_FT
albuterol 90 mcg/inh inhalation aerosol: 2 puff(s) inhaled every 6 hours as needed for  shortness of breath and/or wheezing (last dispensed 12/20/24)  amLODIPine 5 mg oral tablet: 1 tab(s) orally once a day (90-day supply last dispensed 11/23/24)  atorvastatin 40 mg oral tablet: 1 tab(s) orally once a day (90-day supply last dispensed 9/23/24)  Myrbetriq 50 mg oral tablet, extended release: 1 tab(s) orally once a day (90-day supply last dispensed 12/20/24)  pantoprazole 40 mg oral delayed release tablet: 1 tab(s) orally once a day (before a meal)  predniSONE 20 mg oral tablet: 2 tab(s) orally once a day  tamsulosin 0.4 mg oral capsule: 1 cap(s) orally once a day (90-day supply last dispensed 9/19/24)

## 2025-01-15 NOTE — DIETITIAN INITIAL EVALUATION ADULT - PERTINENT LABORATORY DATA
01-15    136  |  105  |  45[H]  ----------------------------<  121[H]  5.1   |  19[L]  |  1.44[H]    Ca    8.8      15 Ag 2025 05:30  Phos  3.5     01-14  Mg     2.20     01-14    TPro  6.9  /  Alb  3.9  /  TBili  0.3  /  DBili  x   /  AST  22  /  ALT  19  /  AlkPhos  63  01-14  POCT Blood Glucose.: 138 mg/dL (01-15-25 @ 11:30)  A1C with Estimated Average Glucose Result: 6.1 % (01-15-25 @ 05:30)  A1C with Estimated Average Glucose Result: 5.9 % (01-14-25 @ 06:13)

## 2025-01-15 NOTE — CHART NOTE - NSCHARTNOTEFT_GEN_A_CORE
Subjective:  The patient reported no complaints during ambulation.     Objective:  An ambulatory oxygen saturation test was conducted. The patient ambulated without difficulty and maintained stability throughout. Oxygen saturation was 96% during and after ambulation. No signs of distress or desaturation were observed.     Assessment:  The patient tolerated the ambulation well, with stable oxygen saturation levels.     Plan:  Continue current management. Patient does not require oxygen supplementation.     Jorge Ortega NP-BC  Medicine [facility]  In-house pager #[phone]

## 2025-01-15 NOTE — DISCHARGE NOTE NURSING/CASE MANAGEMENT/SOCIAL WORK - PATIENT PORTAL LINK FT
You can access the FollowMyHealth Patient Portal offered by [facility] by registering at the following website: [email or web address]. By joining [email or web address]’s FollowMyHealth portal, you will also be able to view your health information using other applications (apps) compatible with our system. None

## 2025-01-15 NOTE — PROGRESS NOTE ADULT - TIME BILLING
Preparing to see the patient including review of tests and other providers' notes, confirming history with family members, performing medical examination and evaluation, counseling and educating the patient/family/caregiver, ordering medications, tests and procedures, communicating with other health care professionals, documenting clinical information in the EMR, independently interpreting results and communicating results to the patient/family/caregiven, care coordination.
Preparing to see the patient including review of tests and other providers' notes, confirming history with family members, performing medical examination and evaluation, counseling and educating the patient/family/caregiver, ordering medications, tests and procedures, communicating with other health care professionals, documenting clinical information in the EMR, independently interpreting results and communicating results to the patient/family/caregiven, care coordination.

## 2025-01-15 NOTE — PROGRESS NOTE ADULT - PROBLEM SELECTOR PLAN 5
- not sure of home BP medication, appreciate med pharmacy input for medrec
- supposed on home amlodipine and benazapril-HCTZ  - does not report taking benazapril-HCTZ or filling it recently. Will hold off for now  - c/w home amlodipine  - close OP PCP/Cards f/u on dc for further medication management.

## 2025-01-15 NOTE — DISCHARGE NOTE PROVIDER - NSDCFUADDAPPT_GEN_ALL_CORE_FT
Follow up with PCP in 1 week.  APPTS ARE READY TO BE MADE: [ ] YES    Best Family or Patient Contact (if needed):    Additional Information about above appointments (if needed):    1: PCP  2: Cardiology  3:     Other comments or requests:

## 2025-01-15 NOTE — DISCHARGE NOTE NURSING/CASE MANAGEMENT/SOCIAL WORK - NSDCPEFALRISK_GEN_ALL_CORE
For information on Fall & Injury Prevention, visit: [email or web address] OR  [email or web address] OR  https://www.cdc.gov/steadi/patient.html

## 2025-01-15 NOTE — DISCHARGE NOTE PROVIDER - NSDCFUSCHEDAPPT_GEN_ALL_CORE_FT
[facility] Physician [facility]  GERIATRICS [address] [place] [address]   Scheduled Appointment: 01/28/2025

## 2025-01-15 NOTE — DIETITIAN INITIAL EVALUATION ADULT - PROBLEM SELECTOR PLAN 3
Per patient, was pending K&B US with urologist but was unable to attend appointment. Takes home medication likely tamsulosin however was unable to confirm with patient.    - confirm home medication and dose then start  - Bladder scan -> if retaining consider K&B US

## 2025-01-15 NOTE — PROGRESS NOTE ADULT - ASSESSMENT
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 Admitted fo possible asthma exacerbation.   
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 Admitted fo possible asthma exacerbation.

## 2025-01-15 NOTE — DIETITIAN INITIAL EVALUATION ADULT - PROBLEM SELECTOR PLAN 1
Per patient, not responding to rescue inhaler. In the ED, received duonebs, solumedrol, Mg with resolution of wheezing and SOB. CXR clear. When evaluated by primary team, comfortable on RA and no wheezing on exam.    - c/w duonebs PRN wheezing  - f/u work up for infectious etiology including bacterial vs viral source  - f/u full RVP -> negative

## 2025-01-15 NOTE — DIETITIAN INITIAL EVALUATION ADULT - PERTINENT MEDS FT
MEDICATIONS  (STANDING):  amLODIPine   Tablet 5 milliGRAM(s) Oral daily  atorvastatin 40 milliGRAM(s) Oral at bedtime  dextrose 5%. 1000 milliLiter(s) (50 mL/Hr) IV Continuous <Continuous>  dextrose 50% Injectable 25 Gram(s) IV Push once  glucagon  Injectable 1 milliGRAM(s) IntraMuscular once  heparin   Injectable 5000 Unit(s) SubCutaneous every 8 hours  pantoprazole    Tablet 40 milliGRAM(s) Oral before breakfast  predniSONE   Tablet 40 milliGRAM(s) Oral daily  tamsulosin 0.4 milliGRAM(s) Oral at bedtime    MEDICATIONS  (PRN):  acetaminophen     Tablet .. 650 milliGRAM(s) Oral every 6 hours PRN Temp greater or equal to 38C (100.4F), Mild Pain (1 - 3)  albuterol    90 MICROgram(s) HFA Inhaler 2 Puff(s) Inhalation every 6 hours PRN Shortness of Breath and/or Wheezing  dextrose Oral Gel 15 Gram(s) Oral once PRN Blood Glucose LESS THAN 70 milliGRAM(s)/deciliter  guaiFENesin  milliGRAM(s) Oral every 12 hours PRN Cough

## 2025-01-15 NOTE — DIETITIAN INITIAL EVALUATION ADULT - PROBLEM SELECTOR PLAN 2
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 Now UA+. Follows with urologist for evaluation of BPH, including pending K&B US per patient.    - s/p 1g Cftx  - If febrile, BCx, UCx  - Bladder scan -> if retaining consider K&B US

## 2025-01-15 NOTE — DIETITIAN INITIAL EVALUATION ADULT - ORAL INTAKE PTA/DIET HISTORY
Patient reports his appetite has been decreasing since his wife passed away in Oct,2024. Pt states he lives alone now, has an aide for 4 hours daily, who helps with ALDs. Pt endorses watch his diet for "low salt and sugar". Reported his UBW 240lbs obtained 2-3 years ago. Pt reports occasionally he takes stool softener for his bowel regularity, endorses constipation sometimes.

## 2025-01-15 NOTE — PROGRESS NOTE ADULT - PROBLEM SELECTOR PLAN 4
- on home flomax 0.4mg daily. continue with it here  - Renal US notable for: Bilateral renal parenchymal disease and small left renal cyst. No hydronephrosis.   - bladder scans
- on home flomax 0.4mg daily and mirabegon 50mg daily. continue.   - Renal US notable for: Bilateral renal parenchymal disease and small left renal cyst. No hydronephrosis.

## 2025-01-15 NOTE — DIETITIAN INITIAL EVALUATION ADULT - PROBLEM SELECTOR PROBLEM 5
Provider E-Visit time total (minutes):   
Provider E-Visit time total (minutes): 11  
Hyperlipidemia

## 2025-01-15 NOTE — DIETITIAN INITIAL EVALUATION ADULT - OTHER INFO
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 Admitted fo possible asthma exacerbation.     Patient seen at bedside. Reports his appetite remains fair in hospital, consumed 50-75% of his breakfast this morning. Pt reports he follows Halal diet, no pork and no beef. Pt currently on Consistent Carbohydrate/Renal diet, pt dx with POLO, with K and Phos WNL, recommend consider liberalizing diet to Consistent Carbohydrate diet only w/o Renal to encourage PO intake. Pt denies chewing and swallowing difficulties. Denies any GI distress (nausea/vomiting/diarrhea/constipation.) LBM today as per pt. Pt noted with d/c planing. Discussed the importance to cont. following low salt diet at home, and review nutrition label reading. Encouraged pt to consume small frequent meals to increase overall meal consumption. Provided pt with Heart Healthy Shopping Tips, and Nutrition Label reading tips. RD to remain available for further nutritional interventions as indicated.    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 Admitted fo possible asthma exacerbation.     Patient seen at bedside. Reports his appetite remains fair in hospital, consumed 50-75% of his breakfast this morning. Pt reports he follows Halal diet, no pork and no beef. Pt currently on Consistent Carbohydrate/Renal diet, pt dx with POLO, renal labs are stable, recommend consider liberalizing diet to Consistent Carbohydrate diet only w/o Renal to encourage PO intake. Pt dx with DM, A1C 6.1% suggest well controlled DM. Pt denies chewing and swallowing difficulties. Denies any GI distress (nausea/vomiting/diarrhea/constipation.) LBM today as per pt. Pt noted with d/c planing. Discussed the importance to cont. following low salt diet at home, and review nutrition label reading. Encouraged pt to consume small frequent meals to increase overall meal consumption. Provided pt with Heart Healthy Shopping Tips, and Nutrition Label reading tips. RD to remain available for further nutritional interventions as indicated.    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 Admitted fo possible asthma exacerbation.     Patient seen at bedside. Reports his appetite remains fair in hospital, consumed 50-75% of his breakfast this morning. Pt reports he follows Halal diet, no pork and no beef. Pt currently on Consistent Carbohydrate/Renal diet, pt dx with POLO, renal labs are stable, recommend consider liberalizing diet to Consistent Carbohydrate diet only w/o Renal to encourage PO intake. Pt dx with DM currently under controlled, Pt's A1C 6.1%. Pt denies chewing and swallowing difficulties. Denies any GI distress (nausea/vomiting/diarrhea/constipation.) LBM today as per pt. Pt noted with d/c planing. Discussed the importance to cont. following low salt diet at home, and review nutrition label reading. Encouraged pt to consume small frequent meals to increase overall meal consumption. Provided pt with Heart Healthy Shopping Tips, and Nutrition Label reading tips. RD to remain available for further nutritional interventions as indicated.

## 2025-01-15 NOTE — DISCHARGE NOTE NURSING/CASE MANAGEMENT/SOCIAL WORK - FINANCIAL ASSISTANCE
[facility] provides services at a reduced cost to those who are determined to be eligible through [facility]’s financial assistance program. Information regarding [facility]’s financial assistance program can be found by going to [email or web address] or by calling [phone].

## 2025-01-15 NOTE — DISCHARGE NOTE PROVIDER - CARE PROVIDER_API CALL
Aliza Morales  Internal Medicine  [address] [place] [id number]  Phone: [phone]  Fax: [phone]  Follow Up Time:

## 2025-01-15 NOTE — DISCHARGE NOTE NURSING/CASE MANAGEMENT/SOCIAL WORK - NSDCFUADDAPPT_GEN_ALL_CORE_FT
APPTS ARE READY TO BE MADE: [ ] YES    Best Family or Patient Contact (if needed):    Additional Information about above appointments (if needed):    1: PCP  2: Cardiology  3:     Other comments or requests:

## 2025-01-15 NOTE — PROGRESS NOTE ADULT - PROBLEM SELECTOR PLAN 1
- Per patient, at home it was not responding to rescue inhaler. In the ED, received duonebs, solumedrol in the ED  - CXR with clear lungs, RVP negative, BNP 700s (wnl for the age). Trop wnl.   - with continued improvement in sx, no longer sob/dowd/wheezing. On RA. Home o2 ambulatory assessment done and did not desaturation, o2 at 96%.  - TTE noted for: LVEF normal, LVEF normal at 66%, mild, grade 1 diastolic dysfunction, elevated R atrial pressure with normal RV cavity and systolic function. Without significant valvular disease or pericardial effusion.   - continue with prednisone (1/13 - ) for 5d course of steroids for acute asthma exacerbation. PPI while on steroids.   - albuterol prn for wheezing/sob    #Leukocytosis  - elevated to 13K -> 11K -> 15K   - afebrile, RVP negative, UA negative, not reporting any urinary sx or UR sx.   - favor likely elevated in setting of steroid use   - monitor closely

## 2025-01-15 NOTE — PROGRESS NOTE ADULT - SUBJECTIVE AND OBJECTIVE BOX
[facility] of Hospital Medicine  Ashley Ascencio DO  Available on MS Teams  Pager: [phone]    Patient is a 84y old  Male who presents with a chief complaint of SOB (15 Ag 2025 13:41)    OVERNIGHT EVENTS: No acute events overnight    SUBJECTIVE: Pt seen and examined at bedside this morning. Reporting feeling well, on RA. Was 96% during ambulatory o2 assessment as well. Reporting no active cp, sob, abd pain, wheezing, dowd, n/v/d/c. US kidney and bladder notable for: b/l renal parenchymal disease and small left renal cyst. No hydronephrosis.     TTE noted for: LVEF normal, LVEF normal at 66%, mild, grade 1 diastolic dysfunction, elevated R atrial pressure with normal RV cavity and systolic function. Without significant valvular disease or pericardial effusion. Vitals reviewed and on RA, afebrile. Cr 1.44, at baseline. PT eval noted without PT needs.     Plan for discharge today. Called and spoke with samuel Umaña at [phone] and reviewed tests/imaging/vitals. In agreement with discharge for the patient today.     ADDITIONAL REVIEW OF SYSTEMS:    MEDICATIONS  (STANDING):  amLODIPine   Tablet 5 milliGRAM(s) Oral daily  atorvastatin 40 milliGRAM(s) Oral at bedtime  dextrose 5%. 1000 milliLiter(s) (50 mL/Hr) IV Continuous <Continuous>  dextrose 50% Injectable 25 Gram(s) IV Push once  glucagon  Injectable 1 milliGRAM(s) IntraMuscular once  heparin   Injectable 5000 Unit(s) SubCutaneous every 8 hours  pantoprazole    Tablet 40 milliGRAM(s) Oral before breakfast  predniSONE   Tablet 40 milliGRAM(s) Oral daily  tamsulosin 0.4 milliGRAM(s) Oral at bedtime    MEDICATIONS  (PRN):  acetaminophen     Tablet .. 650 milliGRAM(s) Oral every 6 hours PRN Temp greater or equal to 38C (100.4F), Mild Pain (1 - 3)  albuterol    90 MICROgram(s) HFA Inhaler 2 Puff(s) Inhalation every 6 hours PRN Shortness of Breath and/or Wheezing  dextrose Oral Gel 15 Gram(s) Oral once PRN Blood Glucose LESS THAN 70 milliGRAM(s)/deciliter  guaiFENesin  milliGRAM(s) Oral every 12 hours PRN Cough      CAPILLARY BLOOD GLUCOSE      POCT Blood Glucose.: 138 mg/dL (15 Ag 2025 11:30)  POCT Blood Glucose.: 125 mg/dL (15 Ag 2025 07:31)  POCT Blood Glucose.: 150 mg/dL (14 Jan 2025 21:39)  POCT Blood Glucose.: 160 mg/dL (14 Jan 2025 17:07)    I&O's Summary    14 Jan 2025 07:01  -  15 Ag 2025 07:00  --------------------------------------------------------  IN: 100 mL / OUT: 750 mL / NET: -650 mL    15 Ag 2025 07:01  -  15 Ag 2025 13:53  --------------------------------------------------------  IN: 50 mL / OUT: 0 mL / NET: 50 mL        PHYSICAL EXAM:  Vital Signs Last 24 Hrs  T(C): 36.8 (15 Ag 2025 06:42), Max: 36.8 (15 Ag 2025 06:42)  T(F): 98.3 (15 Ag 2025 06:42), Max: 98.3 (15 Ag 2025 06:42)  HR: 74 (15 Ag 2025 06:42) (74 - 85)  BP: 148/72 (15 Ag 2025 06:42) (148/72 - 152/76)  BP(mean): --  RR: 17 (15 Ag 2025 13:35) (17 - 19)  SpO2: 96% (15 Ag 2025 13:35) (94% - 97%)    Parameters below as of 15 Ag 2025 13:35  Patient On (Oxygen Delivery Method): room air      CONSTITUTIONAL: NAD, elderly male, on RA, no tachpnea / accessory mm use noted, wearing mask   HEAD: Normocephalic, atraumatic  EYES: EOMI, PERRL, anicteric sclera  ENT: no rhinorrhea, no pharyngeal erythema, MMM, no cervical LAD  RESPIRATORY: No increased work of breathing, CTAB, no wheezes or crackles appreciated  CARDIOVASCULAR: RRR, S1 and S2 present, no m/r/g; no chest wall tenderness   ABDOMEN: soft, NT, ND, bowel sounds present  EXTREMITIES: No LE edema b/l  MUSCULOSKELETAL: no joint swelling, no tenderness to palpation  NEURO: A&Ox4, moving all extremities      LABS:                        11.8   15.74 )-----------( 207      ( 15 Ag 2025 05:30 )             35.8     01-15    136  |  105  |  45[H]  ----------------------------<  121[H]  5.1   |  19[L]  |  1.44[H]    Ca    8.8      15 Ag 2025 05:30  Phos  3.5     01-14  Mg     2.20     01-14    TPro  6.9  /  Alb  3.9  /  TBili  0.3  /  DBili  x   /  AST  22  /  ALT  19  /  AlkPhos  63  01-14          Urinalysis Basic - ( 15 Ag 2025 05:30 )    Color: x / Appearance: x / SG: x / pH: x  Gluc: 121 mg/dL / Ketone: x  / Bili: x / Urobili: x   Blood: x / Protein: x / Nitrite: x   Leuk Esterase: x / RBC: x / WBC x   Sq Epi: x / Non Sq Epi: x / Bacteria: x        Urinalysis with Rflx Culture (collected 13 Jan 2025 17:50)        RADIOLOGY & ADDITIONAL TESTS:    Results Reviewed:   Imaging Personally Reviewed:  Electrocardiogram Personally Reviewed:    COORDINATION OF CARE:  Care Discussed with Consultants/Other Providers [Y/N]:  Prior or Outpatient Records Reviewed [Y/N]:    DISCHARGE PLANNING: ***        
[facility] Medicine  Ashley Ascencio DO  Available on MS Teams  Pager: [phone]    Patient is a 84y old  Male who presents with a chief complaint of SOB (13 Jan 2025 20:58)    OVERNIGHT EVENTS: No acute events overnight.     SUBJECTIVE: Pt seen and examined at bedside this morning and later with son at bedside. Reports improvement in breathing with no longer feeling sob/dyspneic, no wheezing as well. Reporting no urinary frequency, urgency, dysuria. Cr and Na improving. On RA. VSS otherwise.     ADDITIONAL REVIEW OF SYSTEMS:    MEDICATIONS  (STANDING):  albuterol/ipratropium for Nebulization 3 milliLiter(s) Nebulizer every 6 hours  atorvastatin 20 milliGRAM(s) Oral at bedtime  dextrose 5%. 1000 milliLiter(s) (50 mL/Hr) IV Continuous <Continuous>  dextrose 50% Injectable 25 Gram(s) IV Push once  glucagon  Injectable 1 milliGRAM(s) IntraMuscular once  pantoprazole    Tablet 40 milliGRAM(s) Oral before breakfast  predniSONE   Tablet 40 milliGRAM(s) Oral daily  tamsulosin 0.4 milliGRAM(s) Oral at bedtime    MEDICATIONS  (PRN):  acetaminophen     Tablet .. 650 milliGRAM(s) Oral every 6 hours PRN Temp greater or equal to 38C (100.4F), Mild Pain (1 - 3)  dextrose Oral Gel 15 Gram(s) Oral once PRN Blood Glucose LESS THAN 70 milliGRAM(s)/deciliter  guaiFENesin  milliGRAM(s) Oral every 12 hours PRN Cough      CAPILLARY BLOOD GLUCOSE      POCT Blood Glucose.: 161 mg/dL (14 Jan 2025 13:09)  POCT Blood Glucose.: 165 mg/dL (14 Jan 2025 08:53)    I&O's Summary      PHYSICAL EXAM:  Vital Signs Last 24 Hrs  T(C): 36.6 (14 Jan 2025 09:56), Max: 36.6 (14 Jan 2025 09:56)  T(F): 97.8 (14 Jan 2025 09:56), Max: 97.8 (14 Jan 2025 09:56)  HR: 88 (14 Jan 2025 09:56) (88 - 101)  BP: 144/76 (14 Jan 2025 09:56) (126/58 - 144/76)  BP(mean): --  RR: 20 (14 Jan 2025 09:56) (18 - 20)  SpO2: 98% (14 Jan 2025 09:56) (95% - 98%)    Parameters below as of 14 Jan 2025 09:56  Patient On (Oxygen Delivery Method): room air        CONSTITUTIONAL: NAD, elderly male, on RA, no tachpnea / accessory mm use noted   HEAD: Normocephalic, atraumatic  EYES: EOMI, PERRL, anicteric sclera   ENT: no rhinorrhea, no pharyngeal erythema, MMM, no cervical LAD  RESPIRATORY: No increased work of breathing, CTAB, no wheezes or crackles appreciated  CARDIOVASCULAR: RRR, S1 and S2 present, no m/r/g; no chest wall tenderness   ABDOMEN: soft, NT, ND, bowel sounds present  EXTREMITIES: No LE edema b/l  MUSCULOSKELETAL: no joint swelling, no tenderness to palpation  NEURO: A&Ox4, moving all extremities    LABS:                        12.1   11.28 )-----------( 175      ( 14 Jan 2025 06:13 )             37.6     01-14    133[L]  |  99  |  36[H]  ----------------------------<  158[H]  4.4   |  18[L]  |  1.39[H]    Ca    9.0      14 Jan 2025 06:13  Phos  3.5     01-14  Mg     2.20     01-14    TPro  6.9  /  Alb  3.9  /  TBili  0.3  /  DBili  x   /  AST  22  /  ALT  19  /  AlkPhos  63  01-14          Urinalysis Basic - ( 14 Jan 2025 06:13 )    Color: x / Appearance: x / SG: x / pH: x  Gluc: 158 mg/dL / Ketone: x  / Bili: x / Urobili: x   Blood: x / Protein: x / Nitrite: x   Leuk Esterase: x / RBC: x / WBC x   Sq Epi: x / Non Sq Epi: x / Bacteria: x        Urinalysis with Rflx Culture (collected 13 Jan 2025 17:50)        RADIOLOGY & ADDITIONAL TESTS:    Results Reviewed:   Imaging Personally Reviewed:  Electrocardiogram Personally Reviewed:    COORDINATION OF CARE:  Care Discussed with Consultants/Other Providers [Y/N]:  Prior or Outpatient Records Reviewed [Y/N]:

## 2025-01-15 NOTE — DIETITIAN INITIAL EVALUATION ADULT - NS FNS WEIGHT CHANGE REASON
Pt's previous weight hx per HIE record 195lbs (12/13/24), 203 (12/22/22). Most recent adm weight 140 (1/13/25) ? weight accuracy likely due to scale error. Pt appears to be heavier than 140lbs.

## 2025-01-15 NOTE — PHYSICAL THERAPY INITIAL EVALUATION ADULT - PERTINENT HX OF CURRENT PROBLEM, REHAB EVAL
84 year old male with PMHx Asthma, BPH, HTN, HLD, possible history T2DM, prior CVA without deficits, presenting following 1 day of SOB with wheezing not responding to albuterol rescue inhaler. Admitted for possible asthma exacerbation.

## 2025-01-15 NOTE — DISCHARGE NOTE PROVIDER - HOSPITAL COURSE
HPI  84M PMHx asthma, BPH, HLD, HTN presenting with SOB. Per patient, he developed a cough productive of white/yellowish sputum approximately 2 nights ago (1/11). Reported subjective fevers at this time however upon taking his temperature notes it was not elevated. The night prior to admission he developed shortness of breath with exertion along with wheezing not responding to his rescue inhaler. On 1/13 he was set to go for an appointment his urologist for evaluation of kidneys/bladder via imaging however was unable to make the appointment due to shortness of breath, instead came to the hospital. Per patient he has long standing issues with urination related to BPH requiring frequent overnight visits to the bathroom. Endorses he is still urinating. Denies pain/burning with urination however endorses occasional flank pain.. He denies any recent sick contacts or travel. Denies any pain with breathing or hemoptysis. Denies any history of blood clots. Denies any issues with laying flat, new weight gain, new swelling in lower extremities. Denies N/V/Diarrhea/constipation. Per chart review patient was mentioned to have history of CVA in 1998 however denied during interview. Also denied history of diabetes which was noted in his chart.    ED: VSS afebrile, physical exam notable for bilateral inspiratory and expiratory wheezes. Labs notable for WBC 13.1, BUN/Cr 34/1.61 (previous nephrology notes from 5/2018 states baseline is 1.5-1.6, c/w CKD stage 3). Trop wnl. ProBNP 724. VBG with pH 7.28, lactate 1.1, pCO2 49, HOC3 23. UA weakly positive. RVP negative. CXR with clear lung fields. Received duonebs, Mg, solumedrol, 1g ceftriaxone x1, 500cc NS bolus, tylenol for fever.    Upon evaluation by primary team in ED, pt s/p duonebs/Mg/Solumedrol/Cftx and without wheezing on exam, off O2. States feels improved.    Hospital Course.   Pt admitted for asthma exacerbation.      Problem/Plan - 1:  ·  Problem: Acute asthma exacerbation.   ·  Plan: - Per patient, at home it was not responding to rescue inhaler. In the ED, received duonebs, solumedrol in the ED  - CXR with clear lungs, RVP negative, BNP 700s (wnl for the age). Trop wnl.   - with continued improvement in sx, no longer sob/dowd/wheezing. On RA. Home o2 ambulatory assessment done and did not desaturation, o2 at 96%.  - TTE noted for: LVEF normal, LVEF normal at 66%, mild, grade 1 diastolic dysfunction, elevated R atrial pressure with normal RV cavity and systolic function. Without significant valvular disease or pericardial effusion.   - continue with prednisone (1/13 - ) for 5d course of steroids for acute asthma exacerbation. PPI while on steroids.   - albuterol prn for wheezing/sob    #Leukocytosis  - elevated to 13K -> 11K -> 15K   - afebrile, RVP negative, UA negative, not reporting any urinary sx or UR sx.   - favor likely elevated in setting of steroid use   - monitor closely.     Problem/Plan - 2:  ·  Problem: Urinary tract infection.   ·  Plan: PMHx ESBL on UCx in 5/2018 treated with ertapenem. Now UA+. Follows with urologist for evaluation of BPH.     - pt's clinical status reviewed and UA clear, no protein, trace LE, and WBC 7. Without any urinary sx at present.  - s/p CTX in the ED.   - No clinical indication for UTI at present. antibiotics discontinued since yesterday.     Problem/Plan - 3:  ·  Problem: Chronic kidney disease (CKD).   ·  Plan: - Hx of CKD with baseline Cr 1.4-1.6 range  - Creatinine currently at goal    #Mild Hyponatremia - resolved  - Na 134 -> 133 -> 137  - encourage PO intake    #Prediabetes  - A1c 5.9  - lifestyle modifications.     Problem/Plan - 4:  ·  Problem: Benign prostatic hyperplasia.   ·  Plan: - on home flomax 0.4mg daily and mirabegon 50mg daily. continue.   - Renal US notable for: Bilateral renal parenchymal disease and small left renal cyst. No hydronephrosis.     Problem/Plan - 5:  ·  Problem: Hypertension.   ·  Plan: - supposed on home amlodipine and benazapril-HCTZ  - does not report taking benazapril-HCTZ or filling it recently. Will hold off for now  - c/w home amlodipine  - close OP PCP/Cards f/u on dc for further medication management.     Problem/Plan - 6:  ·  Problem: Hyperlipidemia.   ·  Plan: - c/w home lipitor 40mg qhs.    Pt stable, mentating at baseline, on RA, passed ambulatory o2 assessment (no home oxygen needs, SPO2 in high 90s), afebrile, vitals, stable, without active cp, sob, dowd, abd pain, URI or urinary sx and optimized for discharge home. Plan of care discussed with son Mariettaann fletcher and in agreement. Will prescribed prednisone 40mg to complete 5d with PPI while on steroids. Will need close OP f/u with PCP and cardiology. Pt and son expressed understanding and in agreement with plan of care. Pt optimized for dc home today.

## 2025-01-15 NOTE — PHYSICAL THERAPY INITIAL EVALUATION ADULT - ADDITIONAL COMMENTS
Patient lives alone in an apartment, +elevator or steps. Patient denies falls, children check in frequently, and reports use of rescue inhaler at home at times.     Patient left sitting at the edge of bed, in NAD, +call bell. RN made aware of session details.

## 2025-01-15 NOTE — PROGRESS NOTE ADULT - PROBLEM SELECTOR PLAN 7
Diet: consistent carb  DVT PPx: HSQ  Dispo: pending clinical improvement, likely in 24-48 hours.       Updated son at bedside 1/14
Diet: consistent carb  DVT PPx: HSQ  Dispo: discharge today   PT eval without skilled PT needs.       Updated son at bedside 1/14, 1/15  Follow ups: PCP, cardiology

## 2025-01-15 NOTE — PROGRESS NOTE ADULT - PROBLEM SELECTOR PLAN 2
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 Now UA+. Follows with urologist for evaluation of BPH.       - pt's clinical status reviewed and UA clear, no protein, trace LE, and WBC 7. Without any urinary sx at present.  - s/p CTX in the ED.   - No clinical indication for UTI at present. Will discontinue antibiotics.
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 Now UA+. Follows with urologist for evaluation of BPH.       - pt's clinical status reviewed and UA clear, no protein, trace LE, and WBC 7. Without any urinary sx at present.  - s/p CTX in the ED.   - No clinical indication for UTI at present. antibiotics discontinued since yesterday.

## 2025-01-15 NOTE — DIETITIAN INITIAL EVALUATION ADULT - ADD RECOMMEND
1) Recommend add Halal diet and please consider d/c Renal diet given pt with stable renal labs.  2) Encourage PO intake and honor food preferences as able.   3) Monitor PO intake, Labs, weights, BMs, and skin integrity.   4) RD to remain available for further nutritional interventions as indicated.  1) Recommend consider liberalizing diet to Consistent Carbohydrate diet only w/o Renal to encourage PO intake.   2) Encourage PO intake and honor food preferences as able.   3) Monitor PO intake, Labs, weights, BMs, and skin integrity.   4) RD to remain available for further nutritional interventions as indicated.

## 2025-01-15 NOTE — PROGRESS NOTE ADULT - NUTRITIONAL ASSESSMENT
This patient has been assessed with a concern for Malnutrition and has been determined to have a diagnosis/diagnoses of Moderate protein-calorie malnutrition.    This patient is being managed with:   Diet Consistent Carbohydrate Renal w/Evening Snack-  Entered: Jan 14 2025  2:29AM

## 2025-01-28 ENCOUNTER — INPATIENT (INPATIENT)
Facility: HOSPITAL | Age: 85
LOS: 0 days | Discharge: HOME | End: 2025-01-29
Attending: HOSPITALIST | Admitting: HOSPITALIST
Payer: MEDICARE

## 2025-01-28 ENCOUNTER — APPOINTMENT (OUTPATIENT)
Dept: GERIATRICS | Facility: CLINIC | Age: 85
End: 2025-01-28
Payer: MEDICARE

## 2025-01-28 ENCOUNTER — APPOINTMENT (OUTPATIENT)
Dept: PULMONOLOGY | Facility: CLINIC | Age: 85
End: 2025-01-28
Payer: MEDICARE

## 2025-01-28 VITALS
DIASTOLIC BLOOD PRESSURE: 75 MMHG | WEIGHT: 194.01 LBS | HEART RATE: 89 BPM | TEMPERATURE: 98 F | HEIGHT: 72 IN | OXYGEN SATURATION: 98 % | SYSTOLIC BLOOD PRESSURE: 164 MMHG | RESPIRATION RATE: 18 BRPM

## 2025-01-28 VITALS — BODY MASS INDEX: 26.04 KG/M2 | DIASTOLIC BLOOD PRESSURE: 70 MMHG | SYSTOLIC BLOOD PRESSURE: 120 MMHG | HEIGHT: 72 IN

## 2025-01-28 VITALS
HEART RATE: 80 BPM | DIASTOLIC BLOOD PRESSURE: 77 MMHG | TEMPERATURE: 98.3 F | SYSTOLIC BLOOD PRESSURE: 149 MMHG | HEIGHT: 78 IN | WEIGHT: 192 LBS | OXYGEN SATURATION: 96 % | BODY MASS INDEX: 22.21 KG/M2 | RESPIRATION RATE: 16 BRPM

## 2025-01-28 DIAGNOSIS — L20.9 ATOPIC DERMATITIS, UNSPECIFIED: ICD-10-CM

## 2025-01-28 DIAGNOSIS — I10 ESSENTIAL (PRIMARY) HYPERTENSION: ICD-10-CM

## 2025-01-28 DIAGNOSIS — D64.9 ANEMIA, UNSPECIFIED: ICD-10-CM

## 2025-01-28 DIAGNOSIS — R73.9 HYPERGLYCEMIA, UNSPECIFIED: ICD-10-CM

## 2025-01-28 DIAGNOSIS — E55.9 VITAMIN D DEFICIENCY, UNSPECIFIED: ICD-10-CM

## 2025-01-28 DIAGNOSIS — R42 DIZZINESS AND GIDDINESS: ICD-10-CM

## 2025-01-28 DIAGNOSIS — Z87.898 PERSONAL HISTORY OF OTHER SPECIFIED CONDITIONS: ICD-10-CM

## 2025-01-28 DIAGNOSIS — J43.9 EMPHYSEMA, UNSPECIFIED: ICD-10-CM

## 2025-01-28 DIAGNOSIS — R41.89 OTHER SYMPTOMS AND SIGNS INVOLVING COGNITIVE FUNCTIONS AND AWARENESS: ICD-10-CM

## 2025-01-28 DIAGNOSIS — Z86.39 PERSONAL HISTORY OF OTHER ENDOCRINE, NUTRITIONAL AND METABOLIC DISEASE: ICD-10-CM

## 2025-01-28 DIAGNOSIS — G45.9 TRANSIENT CEREBRAL ISCHEMIC ATTACK, UNSPECIFIED: ICD-10-CM

## 2025-01-28 DIAGNOSIS — J98.4 EMPHYSEMA, UNSPECIFIED: ICD-10-CM

## 2025-01-28 DIAGNOSIS — N40.1 BENIGN PROSTATIC HYPERPLASIA WITH LOWER URINARY TRACT SYMPMS: ICD-10-CM

## 2025-01-28 DIAGNOSIS — J45.909 UNSPECIFIED ASTHMA, UNCOMPLICATED: ICD-10-CM

## 2025-01-28 DIAGNOSIS — H52.4 PRESBYOPIA: ICD-10-CM

## 2025-01-28 DIAGNOSIS — H91.90 UNSPECIFIED HEARING LOSS, UNSPECIFIED EAR: ICD-10-CM

## 2025-01-28 DIAGNOSIS — Z23 ENCOUNTER FOR IMMUNIZATION: ICD-10-CM

## 2025-01-28 DIAGNOSIS — H90.5 UNSPECIFIED SENSORINEURAL HEARING LOSS: ICD-10-CM

## 2025-01-28 DIAGNOSIS — E78.5 HYPERLIPIDEMIA, UNSPECIFIED: ICD-10-CM

## 2025-01-28 DIAGNOSIS — Z71.89 OTHER SPECIFIED COUNSELING: ICD-10-CM

## 2025-01-28 LAB
ALBUMIN SERPL ELPH-MCNC: 4.2 G/DL — SIGNIFICANT CHANGE UP (ref 3.3–5)
ALP SERPL-CCNC: 63 U/L — SIGNIFICANT CHANGE UP (ref 40–120)
ALT FLD-CCNC: 23 U/L — SIGNIFICANT CHANGE UP (ref 4–41)
ANION GAP SERPL CALC-SCNC: 12 MMOL/L — SIGNIFICANT CHANGE UP (ref 7–14)
APTT BLD: 18.2 SEC — LOW (ref 24.5–35.6)
AST SERPL-CCNC: 24 U/L — SIGNIFICANT CHANGE UP (ref 4–40)
BASOPHILS # BLD AUTO: 0.07 K/UL — SIGNIFICANT CHANGE UP (ref 0–0.2)
BASOPHILS NFR BLD AUTO: 0.7 % — SIGNIFICANT CHANGE UP (ref 0–2)
BILIRUB SERPL-MCNC: 0.4 MG/DL — SIGNIFICANT CHANGE UP (ref 0.2–1.2)
BLOOD GAS VENOUS COMPREHENSIVE RESULT: SIGNIFICANT CHANGE UP
BUN SERPL-MCNC: 22 MG/DL — SIGNIFICANT CHANGE UP (ref 7–23)
BUN SERPL-MCNC: 22 MG/DL — SIGNIFICANT CHANGE UP (ref 7–23)
CALCIUM SERPL-MCNC: 9.1 MG/DL — SIGNIFICANT CHANGE UP (ref 8.4–10.5)
CALCIUM SERPL-MCNC: 9.1 MG/DL — SIGNIFICANT CHANGE UP (ref 8.4–10.5)
CHLORIDE SERPL-SCNC: 104 MMOL/L — SIGNIFICANT CHANGE UP (ref 98–107)
CO2 SERPL-SCNC: 21 MMOL/L — LOW (ref 22–31)
CO2 SERPL-SCNC: 23 MMOL/L — SIGNIFICANT CHANGE UP (ref 22–31)
CREAT SERPL-MCNC: 1.27 MG/DL — SIGNIFICANT CHANGE UP (ref 0.5–1.3)
CREAT SERPL-MCNC: 1.47 MG/DL — HIGH (ref 0.5–1.3)
EGFR: 47 ML/MIN/1.73M2 — LOW
EGFR: 56 ML/MIN/1.73M2 — LOW
EOSINOPHIL # BLD AUTO: 0.29 K/UL — SIGNIFICANT CHANGE UP (ref 0–0.5)
EOSINOPHIL NFR BLD AUTO: 2.8 % — SIGNIFICANT CHANGE UP (ref 0–6)
GLUCOSE BLDC GLUCOMTR-MCNC: 133 MG/DL — HIGH (ref 70–99)
GLUCOSE SERPL-MCNC: 123 MG/DL — HIGH (ref 70–99)
GLUCOSE SERPL-MCNC: 123 MG/DL — HIGH (ref 70–99)
HCT VFR BLD CALC: 39.2 % — SIGNIFICANT CHANGE UP (ref 39–50)
HGB BLD-MCNC: 12.5 G/DL — LOW (ref 13–17)
IANC: 7.13 K/UL — SIGNIFICANT CHANGE UP (ref 1.8–7.4)
IMM GRANULOCYTES NFR BLD AUTO: 0.3 % — SIGNIFICANT CHANGE UP (ref 0–0.9)
INR BLD: 0.99 RATIO — SIGNIFICANT CHANGE UP (ref 0.85–1.16)
LYMPHOCYTES # BLD AUTO: 1.71 K/UL — SIGNIFICANT CHANGE UP (ref 1–3.3)
LYMPHOCYTES # BLD AUTO: 16.7 % — SIGNIFICANT CHANGE UP (ref 13–44)
MAGNESIUM SERPL-MCNC: 1.8 MG/DL — SIGNIFICANT CHANGE UP (ref 1.6–2.6)
MCHC RBC-ENTMCNC: 28.9 PG — SIGNIFICANT CHANGE UP (ref 27–34)
MCHC RBC-ENTMCNC: 31.9 G/DL — LOW (ref 32–36)
MCV RBC AUTO: 90.5 FL — SIGNIFICANT CHANGE UP (ref 80–100)
MONOCYTES # BLD AUTO: 1.03 K/UL — HIGH (ref 0–0.9)
MONOCYTES NFR BLD AUTO: 10 % — SIGNIFICANT CHANGE UP (ref 2–14)
NEUTROPHILS # BLD AUTO: 7.13 K/UL — SIGNIFICANT CHANGE UP (ref 1.8–7.4)
NEUTROPHILS NFR BLD AUTO: 69.5 % — SIGNIFICANT CHANGE UP (ref 43–77)
NRBC # BLD AUTO: 0 K/UL — SIGNIFICANT CHANGE UP (ref 0–0)
NRBC # BLD: 0 /100 WBCS — SIGNIFICANT CHANGE UP (ref 0–0)
NRBC # FLD: 0 K/UL — SIGNIFICANT CHANGE UP (ref 0–0)
NRBC BLD-RTO: 0 /100 WBCS — SIGNIFICANT CHANGE UP (ref 0–0)
PHOSPHATE SERPL-MCNC: 3.9 MG/DL — SIGNIFICANT CHANGE UP (ref 2.5–4.5)
PLATELET # BLD AUTO: 175 K/UL — SIGNIFICANT CHANGE UP (ref 150–400)
POTASSIUM SERPL-MCNC: 4.6 MMOL/L — SIGNIFICANT CHANGE UP (ref 3.5–5.3)
POTASSIUM SERPL-SCNC: 4.6 MMOL/L — SIGNIFICANT CHANGE UP (ref 3.5–5.3)
PROT SERPL-MCNC: 7 G/DL — SIGNIFICANT CHANGE UP (ref 6–8.3)
PROTHROM AB SERPL-ACNC: 11.8 SEC — SIGNIFICANT CHANGE UP (ref 9.9–13.4)
RBC # BLD: 4.33 M/UL — SIGNIFICANT CHANGE UP (ref 4.2–5.8)
RBC # FLD: 12.7 % — SIGNIFICANT CHANGE UP (ref 10.3–14.5)
SODIUM SERPL-SCNC: 139 MMOL/L — SIGNIFICANT CHANGE UP (ref 135–145)
TROPONIN T, HIGH SENSITIVITY RESULT: 23 NG/L — SIGNIFICANT CHANGE UP
WBC # BLD: 10.26 K/UL — SIGNIFICANT CHANGE UP (ref 3.8–10.5)
WBC # FLD AUTO: 10.26 K/UL — SIGNIFICANT CHANGE UP (ref 3.8–10.5)

## 2025-01-28 PROCEDURE — 99291 CRITICAL CARE FIRST HOUR: CPT | Mod: GC

## 2025-01-28 PROCEDURE — 94726 PLETHYSMOGRAPHY LUNG VOLUMES: CPT

## 2025-01-28 PROCEDURE — 70496 CT ANGIOGRAPHY HEAD: CPT | Mod: 26

## 2025-01-28 PROCEDURE — 70498 CT ANGIOGRAPHY NECK: CPT | Mod: 26

## 2025-01-28 PROCEDURE — 0042T: CPT

## 2025-01-28 PROCEDURE — 70450 CT HEAD/BRAIN W/O DYE: CPT | Mod: 26

## 2025-01-28 PROCEDURE — 99497 ADVNCD CARE PLAN 30 MIN: CPT | Mod: 25

## 2025-01-28 PROCEDURE — 94729 DIFFUSING CAPACITY: CPT

## 2025-01-28 PROCEDURE — 99205 OFFICE O/P NEW HI 60 MIN: CPT

## 2025-01-28 PROCEDURE — 99223 1ST HOSP IP/OBS HIGH 75: CPT

## 2025-01-28 PROCEDURE — 90677 PCV20 VACCINE IM: CPT

## 2025-01-28 PROCEDURE — G0009: CPT

## 2025-01-28 PROCEDURE — 94060 EVALUATION OF WHEEZING: CPT

## 2025-01-28 RX ORDER — AMLODIPINE BESYLATE 5 MG/1
5 TABLET ORAL
Qty: 90 | Refills: 0 | Status: ACTIVE | COMMUNITY
Start: 2025-01-28

## 2025-01-28 RX ORDER — ASPIRIN 81 MG/1
81 TABLET, COATED ORAL DAILY
Qty: 90 | Refills: 0 | Status: ACTIVE | COMMUNITY
Start: 2025-01-28

## 2025-01-28 RX ORDER — BACTERIOSTATIC SODIUM CHLORIDE 0.9 %
1000 VIAL (ML) INJECTION
Refills: 0 | Status: DISCONTINUED | OUTPATIENT
Start: 2025-01-28 | End: 2025-01-29

## 2025-01-28 RX ORDER — BACTERIOSTATIC SODIUM CHLORIDE 0.9 %
500 VIAL (ML) INJECTION ONCE
Refills: 0 | Status: COMPLETED | OUTPATIENT
Start: 2025-01-28 | End: 2025-01-28

## 2025-01-28 RX ORDER — HEPARIN SODIUM,PORCINE 10000/ML
5000 VIAL (ML) INJECTION EVERY 12 HOURS
Refills: 0 | Status: DISCONTINUED | OUTPATIENT
Start: 2025-01-28 | End: 2025-01-29

## 2025-01-28 RX ORDER — TAMSULOSIN HYDROCHLORIDE 0.4 MG/1
0.4 CAPSULE ORAL AT BEDTIME
Refills: 0 | Status: DISCONTINUED | OUTPATIENT
Start: 2025-01-28 | End: 2025-01-29

## 2025-01-28 RX ORDER — ASPIRIN 81 MG/1
81 TABLET, COATED ORAL DAILY
Refills: 0 | Status: DISCONTINUED | OUTPATIENT
Start: 2025-01-28 | End: 2025-01-29

## 2025-01-28 RX ORDER — ALBUTEROL SULFATE 90 UG/1
108 (90 BASE) INHALANT RESPIRATORY (INHALATION)
Qty: 1 | Refills: 5 | Status: ACTIVE | COMMUNITY
Start: 2025-01-28 | End: 1900-01-01

## 2025-01-28 RX ORDER — ATORVASTATIN CALCIUM 80 MG/1
40 TABLET, FILM COATED ORAL AT BEDTIME
Refills: 0 | Status: DISCONTINUED | OUTPATIENT
Start: 2025-01-28 | End: 2025-01-29

## 2025-01-28 RX ADMIN — ATORVASTATIN CALCIUM 40 MILLIGRAM(S): 80 TABLET, FILM COATED ORAL at 22:54

## 2025-01-28 RX ADMIN — TAMSULOSIN HYDROCHLORIDE 0.4 MILLIGRAM(S): 0.4 CAPSULE ORAL at 22:54

## 2025-01-28 RX ADMIN — Medication 500 MILLILITER(S): at 22:54

## 2025-01-28 NOTE — H&P ADULT - NSHPSOCIALHISTORY_GEN_ALL_CORE
SOCIAL HISTORY:    Marital Status:  (  )    (  ) Single        (  )        (  )        (  )   Lives with:          (  ) Alone      (  ) Spouse     (  ) Children         (  ) Parents             (  ) Other    No history of smoking  No history of alcohol abuse  No history of illegal drug use    Occupation: SOCIAL HISTORY:    Marital Status:  (  )    (  ) Single        (  )        (  )        ( x )  - after 60 years of marriage  Lives with:          ( x ) Alone      (  ) Spouse     (  ) Children         (  ) Parents             (  ) Other    No history of smoking  No history of alcohol abuse  No history of illegal drug use    Occupation:

## 2025-01-28 NOTE — H&P ADULT - PROBLEM SELECTOR PLAN 2
Acute onset while standing after completing an hour long "breathing test"  Room spinning sensation and worsened over time  History of CVA w/o residual deficits  CT findings as above (see reports)  Could have been impacted by dehydration; patient currently appears dehydrated  No gross signs of infection  ECG as above; no sign of A-fib  - f/u MRI of the brain (ordered)  - f/u TTE with Bubble study  - Neurology Team following (appreciated)  - f/u AM lab-work, including TSH, vitamin D levels  - fall precautions  - Physical Therapy emmanuel

## 2025-01-28 NOTE — CONSULT NOTE ADULT - CRITICAL CARE ATTENDING COMMENT
DOS 1/29/25    84M jorge HTN, HLD, asthma, recent admission 2 weeks ago for asthma exacerbation, bilateral cerebellar strokes (on ASA 81 mg qd), presents to [facility] ED as code stroke for vertigo.   pts son declined tnk  no LVO on CTA  CTH with chronic bilateral cerebellar infarcts - seen on MRI from 2004, also with possible meningioma abutting ? the cervical cord  CTA with extracranial athero at the bilateral v1, v2 ,   intracranial atherosclerosis mild to moderate, predominantly in the posterior circulation   currently improved on exam    Vertigo - may be peripheral but given posterior circulation atherosclerosis would r/o new stroke. 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

## 2025-01-28 NOTE — H&P ADULT - PROBLEM SELECTOR PLAN 1
Presented with report of acute onset of dizziness while standing, after doing an hour long breathing test  CTs = "...Severe stenosis of the bilateral vertebral arteries at origins, w/ ascending collateral arteries off thyrocervical trunks....No significant carotid stenosis...Multifocal atherosclerotic disease, notably of the right more than L-vertebral arteries and mild-moderately at calcified ICAs.  Incidental 1.7 cm enhancing mass along dura at the R-ventral lower foramen magnum and cervicomedullary junction which is likely a meningioma."  ECG = Sinus rhythm w/ 1st degree AVB at 85 bpm, QTc = 435  History of CVA w/o residual deficits  No gross signs of infection  S/P Stroke Code in the ED and is being followed by Neurology Team (appreciated)  NIHSS = 0  - neurological checks Q4H, vital signs Q4H, permissive hypertension  - f/u MRI of the brain w/o contrast  - atorvastatin increased from PTA 40 mg to 80 mg.  Aspirin continued as PTA  - f/u AM lab-work  - f/u TTE w/ Bubble Study  - Physical Therapy eval  - Social Work eval  - patient passed bedside dysphagia screen

## 2025-01-28 NOTE — ED PROVIDER NOTE - ATTENDING CONTRIBUTION TO CARE
Agree with resident note  84-year-old male with history of asthma, BPH, hypertension, hyperlipidemia, diabetes, prior cerebellar infarct presents with acute onset of dizziness.  Patient last known normal was 3:30 PM.  Patient went to see PCP earlier this morning and then had a what sounds like pulmonary function test.  Subsequently went with son to another appointment and at that point became acutely dizzy unable to walk and intensely nauseated.  Patient denies any recent illness.  Patient denies any ear pain.  Physical exam  Well-appearing male in no respiratory distress  Vital signs stable  Clear to auscultation bilaterally  S1-S2 no murmurs rubs gallops  Abdomen soft nontender nondistended  Neuro cranial nerves intact, 5/5 motor upper and lower extremity, sensation intact, no nystagmus, needs assistance with walking, negative Romberg  Impression  Imaging negative (CT CTA CTP), neuro has offered TNK, son is refusing at this time, patient still having trouble walking will admit to hospital for MRI and further workup

## 2025-01-28 NOTE — ED PROVIDER NOTE - PROGRESS NOTE DETAILS
Juan A Humphries DO (PGY3)  Patient was offered TNK by neurology, family and patient would not like to pursue that option.  Neurology recommending CDU for MRI. [employer] PGY3 - 84-year-old male with a history of asthma, BPH, HTN, HLD, T2DM, prior CVA without deficits presenting with acute onset dizziness and unsteadiness on his feet.  Evaluated by neurology as a code stroke.  Offered TNK but family declines at this time.  Admit to medicine for MRI and further neurologic workup. samuel Horowitz can be reached at [phone]

## 2025-01-28 NOTE — CONSULT NOTE ADULT - SUBJECTIVE AND OBJECTIVE BOX
Yes incomplete  HPI:         REVIEW OF SYSTEMS    A 10-system ROS was performed and is negative except for those items noted above and/or in the HPI.    PAST MEDICAL & SURGICAL HISTORY:  Hypertension      CVA (cerebral infarction)      Diabetes      High cholesterol      Asthma      No significant past surgical history        FAMILY HISTORY:    SOCIAL HISTORY:   T/E/D:   Occupation:   Lives with:     MEDICATIONS (HOME):  Home Medications:  albuterol 90 mcg/inh inhalation aerosol: 2 puff(s) inhaled every 6 hours as needed for  shortness of breath and/or wheezing (last dispensed 12/20/24) (14 Jan 2025 16:36)  amLODIPine 5 mg oral tablet: 1 tab(s) orally once a day (90-day supply last dispensed 11/23/24) (14 Jan 2025 16:36)  atorvastatin 40 mg oral tablet: 1 tab(s) orally once a day (90-day supply last dispensed 9/23/24) (14 Jan 2025 16:36)  Myrbetriq 50 mg oral tablet, extended release: 1 tab(s) orally once a day (90-day supply last dispensed 12/20/24) (14 Jan 2025 16:36)  tamsulosin 0.4 mg oral capsule: 1 cap(s) orally once a day (90-day supply last dispensed 9/19/24) (14 Jan 2025 16:37)    MEDICATIONS  (STANDING):    MEDICATIONS  (PRN):    ALLERGIES/INTOLERANCES:  Allergies  No Known Allergies    Intolerances    VITALS & EXAMINATION:  Vital Signs Last 24 Hrs  T(C): 36.7 (28 Jan 2025 17:59), Max: 36.7 (28 Jan 2025 17:59)  T(F): 98 (28 Jan 2025 17:59), Max: 98 (28 Jan 2025 17:59)  HR: 89 (28 Jan 2025 17:59) (89 - 89)  BP: 164/75 (28 Jan 2025 17:59) (164/75 - 164/75)  BP(mean): --  RR: 18 (28 Jan 2025 17:59) (18 - 18)  SpO2: 98% (28 Jan 2025 17:59) (98% - 98%)    Parameters below as of 28 Jan 2025 17:59  Patient On (Oxygen Delivery Method): room air        General:  Constitutional: Male, appears stated age, in no apparent distress including pain  Head: Normocephalic & Atraumatic.  Respiratory: Breathing comfortably.    Neurological:  MS: Eyes open. Awake, alert, oriented to person, place, situation, time. Follows all commands.    Language: Speech is fluent with good repetition & comprehension.    CNs: PERRL (R = 3mm, L = 3mm). VFF. EOMI, L beating nystagmus on L gaze. V1-3 intact to LT b/l. No facial asymmetry b/l, full eye closure strength b/l. Hearing grossly normal (rubbing fingers) b/l. Tongue midline, normal movements, no atrophy.     Motor: Normal muscle bulk & tone. No noticeable tremor. No pronator drift. No drift of UE or LE b/l.     Sensation: Intact to LT b/l throughout.     Cortical: Extinction on DSS (neglect): none    Reflexes:              Biceps(C5)       BR(C6)     Triceps(C7)               Patellar(L4)    Achilles(S1)    Plantar Resp  R	              2	          2	             2		                              2		    2		      Down   L	              2	          2	             2		                              2		    2		      Down     Coordination: intact rapid-alt movements. No dysmetria to FTN/HTS    Gait: Able to maintain posture without assistance, needs personal assistance for cautious, slow gait but no lateropulsion.     LABORATORY:  CBC   Chem       LFTs   Coagulopathy   Lipid Panel 01-14 Chol 140 LDL -- HDL 65 Trig 39  A1c   Cardiac enzymes     U/A   CSF  Immunological  Other    STUDIES & IMAGING:  Studies (EKG, EEG, EMG, etc):     Radiology (XR, CT, MR, U/S, TTE/CHEYANNE): HPI: Patient is an 83 yo L-H M w/ pmhx of HTN, HLD, asthma, recent admission 2 weeks ago for asthma exacerbation, bilateral cerebellar strokes (on ASA 81 mg qd), presents to [facility] ED as code stroke for vertigo. LKW 1700 when patient was at his baseline getting a respiratory exam performed for his asthma. During the encounter acutely endorsed room spinning dizziness that worsened with movements and felt imbalanced while ambulating. Had 1 episode of NBNB emesis prior to arrival and then 2 other episodes while in the emergency department. On arrival, had persisting dizziness. Denies HA, changes in vision, weakness, numbness or tingling, difficulty with speech. Denies SOB, CP, palpitations.  Denies trauma/injury. Denies recent infection or sick contacts. At baseline uses a cane to ambulate otherwise independent with ADLs. Patient was within window for thrombolytics. Risks/benefits discussed with son (as patient deferred to son for consent), who prefers to avoid introducing any possible risk and would prefer symptomatic treatment and workup instead. Patient denies history of stroke and denies any previous episodes of dizziness, however, in 2004 had MRI done here for indication of vertigo.          REVIEW OF SYSTEMS    A 10-system ROS was performed and is negative except for those items noted above and/or in the HPI.    PAST MEDICAL & SURGICAL HISTORY:  Hypertension      CVA (cerebral infarction)      Diabetes      High cholesterol      Asthma      No significant past surgical history        FAMILY HISTORY:    SOCIAL HISTORY:   T/E/D:   Occupation:   Lives with:     MEDICATIONS (HOME):  Home Medications:  albuterol 90 mcg/inh inhalation aerosol: 2 puff(s) inhaled every 6 hours as needed for  shortness of breath and/or wheezing (last dispensed 12/20/24) (14 Jan 2025 16:36)  amLODIPine 5 mg oral tablet: 1 tab(s) orally once a day (90-day supply last dispensed 11/23/24) (14 Jan 2025 16:36)  atorvastatin 40 mg oral tablet: 1 tab(s) orally once a day (90-day supply last dispensed 9/23/24) (14 Jan 2025 16:36)  Myrbetriq 50 mg oral tablet, extended release: 1 tab(s) orally once a day (90-day supply last dispensed 12/20/24) (14 Jan 2025 16:36)  tamsulosin 0.4 mg oral capsule: 1 cap(s) orally once a day (90-day supply last dispensed 9/19/24) (14 Jan 2025 16:37)    MEDICATIONS  (STANDING):    MEDICATIONS  (PRN):    ALLERGIES/INTOLERANCES:  Allergies  No Known Allergies    Intolerances    VITALS & EXAMINATION:  Vital Signs Last 24 Hrs  T(C): 36.7 (28 Jan 2025 17:59), Max: 36.7 (28 Jan 2025 17:59)  T(F): 98 (28 Jan 2025 17:59), Max: 98 (28 Jan 2025 17:59)  HR: 89 (28 Jan 2025 17:59) (89 - 89)  BP: 164/75 (28 Jan 2025 17:59) (164/75 - 164/75)  BP(mean): --  RR: 18 (28 Jan 2025 17:59) (18 - 18)  SpO2: 98% (28 Jan 2025 17:59) (98% - 98%)    Parameters below as of 28 Jan 2025 17:59  Patient On (Oxygen Delivery Method): room air        General:  Constitutional: Male, appears stated age, in no apparent distress including pain  Head: Normocephalic & Atraumatic.  Respiratory: Breathing comfortably.    Neurological:  MS: Eyes open. Awake, alert, oriented to person, place, situation, time. Follows all commands.    Language: Speech is fluent with good repetition & comprehension.    CNs: PERRL (R = 3mm, L = 3mm). VFF. EOMI, L beating nystagmus on L gaze. V1-3 intact to LT b/l. On head R impulse, breaks fixation with L corrective saccade. No facial asymmetry b/l, full eye closure strength b/l. Hearing grossly normal (rubbing fingers) b/l. Tongue midline, normal movements, no atrophy.     Motor: Normal muscle bulk & tone. No noticeable tremor. No pronator drift. No drift of UE or LE b/l.     Sensation: Intact to LT b/l throughout.     Cortical: Extinction on DSS (neglect): none    Reflexes: Patient not amenable to reflex testing      Coordination: No obvious FTN/HTS dysmetria.     Gait: Able to maintain posture without assistance, needs personal assistance for cautious, slow gait but no lateropulsion. On reassessment, patient stands unassisted w/ negative Romberg refuses to ambulate.         LABORATORY:  CBC   Chem       LFTs   Coagulopathy   Lipid Panel 01-14 Chol 140 LDL -- HDL 65 Trig 39  A1c   Cardiac enzymes     U/A   CSF  Immunological  Other    STUDIES & IMAGING:  Studies (EKG, EEG, EMG, etc):     Radiology (XR, CT, MR, U/S, TTE/CHEYANNE):    CTH: No acute intracranial hemorrhage, mass effect, or midline shift.    Chronic infarcts seen in the bilateral cerebellar hemispheres, not   definitely changed from MRI the brain from 2004.      CT PERFUSION:  No deficits reported.    CTA NECK:  Severe stenosis of the bilateral vertebral arteries at origins, with   ascending collateral arteries off thyrocervical trunks.    No significant carotid stenosis.    CTA HEAD:    No arterial occlusion.    Multifocal atherosclerotic disease, notably of the right more than left   vertebral arteries and mild-moderately at calcified ICAs.    Incidental 1.7 cm enhancing mass along dura at the right-ventral lower   foramen magnum and cervicomedullary junction which is likely a   meningioma. Follow-up cervical spine MRI with contrast can be obtained   for surveillance. Correlate for any myelopathic signs/symptoms given   contouring of upper spinal cord.    --- End of Report ---

## 2025-01-28 NOTE — H&P ADULT - PROBLEM SELECTOR PLAN 7
No acute issues presently  Permissive hypertension, as per Neurology Team  - f/u for restart of PTA antihypertensive med

## 2025-01-28 NOTE — CONSULT NOTE ADULT - ASSESSMENT
incomplete     NIHSS: 0  mRS: 1     Patient is not a thrombolytic candidate because they are outside of the appropriate window of treatment.  Patient is not a mechanical thrombectomy candidate because no evidence of LVO.    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 LKW 1700 when patient was at his baseline getting a respiratory exam performed for his asthma. Exam as above, grossly nonfocal, HINTS limited as patient does not participate accurately with exam. CTH/CTA/CTP pending.     NIHSS: 0  mRS: 1     Patient's son offered tenecteplase for patient (patient deferred to son) but declined as he did not want to expose patient to any risk of harm.   Patient is not a mechanical thrombectomy candidate because no evidence of LVO.    Impression: Acute vestibular syndrome of unclear localization/etiology at this time. HINTS exam and no focal findings pointing to peripheral etiology, though has history of old cerebellar strokes. Rule out recurrent posterior circulation, acute ischemic stroke. Consideration for recrudescence from underlying toxic metabolic/infectious etiologies. 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     Recommendations     [] MRI brain w/o contrast   [] TTE   [] C/w home ASA 81 mg monotherapy for now  [] Atorvastatin 80, titrate to LDL<70  [] DVT prophylaxis  [] HbA1C and Lipid Panel  [] Telemonitoring;  Neurochecks and Vital signs q4hr   [] Permissive HTN up to 220/120 mmHg for first 24 hours after the symptom onset followed by gradual normotension.   [] PT/OT evaluation; Outpatient Vestibular therapy w/ [facility]  [] NPO until clears Dysphagia screen, otherwise Swallow evaluation  [] Upon discharge, patient should follow up with Dr. Moscoso;  [address] [place] [address]. Call [phone].     Patient case discussed with stroke fellow, Dr. Alva, under supervision of stroke attending, Dr. Moscoso. Patient to be seen on morning rounds.     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 LKW 1700 when patient was at his baseline getting a respiratory exam performed for his asthma. Exam as above, grossly nonfocal, HINTS limited as patient does not participate accurately with exam. CTH/CTA/CTP pending.     NIHSS: 0  mRS: 1     Patient's son offered tenecteplase for patient (patient deferred to son) but declined as he did not want to expose patient to any risk of harm.   Patient is not a mechanical thrombectomy candidate because no evidence of LVO.    Impression: Acute vestibular syndrome of unclear localization/etiology at this time. HINTS exam and no focal findings pointing to peripheral etiology, though has history of old cerebellar strokes. Rule out recurrent posterior circulation, acute ischemic stroke. Consideration for recrudescence from underlying toxic metabolic/infectious etiologies. 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     Recommendations     [] MRI brain w/o contrast - inpatient v outpatient  [] MRI C spine- inpatient v outpatient  [] TTE   [] C/w home ASA 81 mg monotherapy for now  [] Atorvastatin 80, titrate to LDL<70  [] DVT prophylaxis  [] HbA1C and Lipid Panel  [] Telemonitoring;  Neurochecks and Vital signs q4hr   [] Permissive HTN up to 220/120 mmHg for first 24 hours after the symptom onset followed by gradual normotension.   [] PT/OT evaluation; Outpatient Vestibular therapy w/ [facility]  [] NPO until clears Dysphagia screen, otherwise Swallow evaluation  [] Upon discharge, patient should follow up with Dr. Moscoso;  [address] [place] [address]. Call [phone].     Patient case discussed with stroke fellow, Dr. Alva, under supervision of stroke attending, Dr. Moscoso. Patient to be seen on morning rounds.     Please call with questions: [phone]

## 2025-01-28 NOTE — ED PROVIDER NOTE - CLINICAL SUMMARY MEDICAL DECISION MAKING FREE TEXT BOX
84-year-old male history of asthma, BPH, hypertension, hyperlipidemia, type 2 diabetes, prior CVA without deficits presenting with acute onset of dizziness.  Patient states last known well was around 3:30 PM.  Accompanied by son for collateral who states he found the patient with dizziness and vomiting since 4:30 PM.  Patient denies any recent fevers, chills, chest pain, shortness of breath, abdominal pain, diarrhea or urinary complaints.  Patient denies any numbness or weakness to extremities bilaterally    Juan A Humphries DO ([id number])   Physical Exam:    Gen: NAD, AOx3  Head: NCAT  HEENT: EOMI, PEERLA  Lung: CTAB, no respiratory distress, no wheezes/rhonchi/rales B/L  CV: RRR, no murmurs, rubs or gallops  Abd: soft, NT, ND, no guarding, no rigidity, no rebound tenderness, no CVA tenderness   MSK: no visible deformities, ROM normal in UE/LE, no back pain  Neuro: No focal sensory or motor deficits. Sensation intact to light touch all extremities.  Skin: Warm, well perfused, no rash, no leg swelling  Psych: normal affect, calm    Plan for code stroke imaging, labs, symptomatic control.   Differential diagnosis includes but not limited to ischemic stroke vs. TIA vs. BPPV vs. infectious etiology vs. metabolic derangement

## 2025-01-28 NOTE — H&P ADULT - PROBLEM SELECTOR PLAN 4
Report death of wife over one year ago, after 60  years of marriage, and with depression due to same  Lives alone  Requesting to speak with Psychiatry Team (please call)  - f/u TSH, vitamin D levels  - ensure optimal hydration

## 2025-01-28 NOTE — H&P ADULT - PROBLEM SELECTOR PLAN 6
Glucose = 123 on CMP.  A1c = 6.1 on 07/15/2024  Per chart review, was on prednisone for recent asthma exacerbation this month  - f/u A1c level in the AM  - consistent carb diet

## 2025-01-28 NOTE — H&P ADULT - HISTORY OF PRESENT ILLNESS
84-year-old male, with past history significant for CVA - without residual deficits, Asthma, Hypertension, Dyslipidemia, and Type-2 DM, presented to the ED secondary acute of dizziness.  Seen and evaluated at bedside;    Vital signs upon ED presentation as follows: BP = 164/73, HR = 89, RR = 18, T = 36.7 C (98 F), O2 Sat = 98% on RA.  Diagnosed with Dizziness in the ED. 84-year-old male, with past history significant for CVA - without residual deficits, Asthma, Hypertension, Dyslipidemia, and Type-2 DM, presented to the ED secondary acute of dizziness.  Seen and evaluated at bedside; NAD.  Patient reports going to his PMD's office in the AM and having 8 vials of blood drawn.  Also received a vaccine at the time; not Influenza vaccine (received that in 10/2024).  Later, patient went home, ate and went to another appointment for a "breathing test."  Test lasted about one hour and afterwards, as patient was standing and waiting for his son to arrive, had acute onset of dizziness.  Nurse at the facility allowed him to sit, but the sensation worsened.  Son was called and upon seeing patient, brought him to the ED.  Patient reports having episode of non-bloody vomiting while experiencing dizziness, and noted that his body was trembling - as if having chills.  Also had frontal and coronal areas headache, as well as tinnitus.  Experienced shortness of breath during the dizziness.  No report of fever, diaphoresis or any other associated signs/symptoms.    Vital signs upon ED presentation as follows: BP = 164/73, HR = 89, RR = 18, T = 36.7 C (98 F), O2 Sat = 98% on RA.  Diagnosed with Dizziness in the ED.

## 2025-01-28 NOTE — ED ADULT NURSE REASSESSMENT NOTE - NS ED NURSE REASSESS COMMENT FT1
Pt received from [facility] Nilo Peterson, resting comfortably in stretcher breathing even and unlabored. vitals as documented. NIH & Dysphagia screen as documented. Pt is denying any numbness, tingling, dizziness, weakness, SOB, chest pain, blurry vision at this time. IV patent. Son at bedside. Bed in lowest side rails up safety and comfort maintained. No acute distress noted.

## 2025-01-28 NOTE — PATIENT PROFILE ADULT - NSPROGENSOURCEINFO_GEN_A_NUR
[Midline] : trachea located in midline position [Removed] : palatine tonsils previously removed [de-identified] : Postnasal drainage and irritation of oropharyngeal mucosa. [Normal] : no rashes patient

## 2025-01-28 NOTE — PATIENT PROFILE ADULT - FALL HARM RISK - HARM RISK INTERVENTIONS

## 2025-01-28 NOTE — ED ADULT TRIAGE NOTE - TEMP AT ED ARRIVAL (C)
Pt presents to the ED from [facility] with c/o generalized weakness and fatigue. States in started on Friday. Pt states his right side is weaker than the left side. Stated that started a couple months ago. Pt is a DNR-CCA. Signed paperwork in chart. 36.7

## 2025-01-28 NOTE — H&P ADULT - NSHPPHYSICALEXAM_GEN_ALL_CORE
Vital Signs Last 24 Hrs  T(C): 36.9 (28 Jan 2025 21:47), Max: 36.9 (28 Jan 2025 21:47)  T(F): 98.4 (28 Jan 2025 21:47), Max: 98.4 (28 Jan 2025 21:47)  HR: 87 (28 Jan 2025 21:47) (80 - 89)  BP: 151/79 (28 Jan 2025 21:47) (151/79 - 164/75)  BP(mean): 99 (28 Jan 2025 19:41) (99 - 99)  RR: 15 (28 Jan 2025 21:47) (15 - 18)  SpO2: 100% (28 Jan 2025 21:47) (98% - 100%)    Parameters below as of 28 Jan 2025 21:47  Patient On (Oxygen Delivery Method): room air    ================================================== Vital Signs Last 24 Hrs  T(C): 36.9 (28 Jan 2025 21:47), Max: 36.9 (28 Jan 2025 21:47)  T(F): 98.4 (28 Jan 2025 21:47), Max: 98.4 (28 Jan 2025 21:47)  HR: 87 (28 Jan 2025 21:47) (80 - 89)  BP: 151/79 (28 Jan 2025 21:47) (151/79 - 164/75)  BP(mean): 99 (28 Jan 2025 19:41) (99 - 99)  RR: 15 (28 Jan 2025 21:47) (15 - 18)  SpO2: 100% (28 Jan 2025 21:47) (98% - 100%)    Parameters below as of 28 Jan 2025 21:47  Patient On (Oxygen Delivery Method): room air    ==================================================    PHYSICAL EXAMINATION:    APPEARANCE: Adequately groomed, adequately nourished, dehydrated appearing male, lying propped up in stretcher in NAD  HEENT: Dry lips.  Dry oral mucosa.  Small amount of dry cerumen in R-ear.  Arcus senilis bilaterally.  Pupils reactive to light.  EOMI  LYMPHATIC: No lymphadenopathy appreciated  CARDIOVASCULAR: (+) S1 S2.  No JVD.  No murmurs.  No edema  RESPIRATORY: No wheezing, rhonchi, crackles appreciated  GASTROINTESTINAL: Soft.  Non-tender.  (+) BS  GENITOURINARY: No suprapubic tenderness.  No CVA tenderness B/L  EXTREMITIES: Normal range of motion.  No clubbing, cyanosis or edema  MUSCULOSKELETAL: No atrophy.  No asymmetry.  Good ROM  SKIN: No rashes. No ecchymoses.  No cyanosis  PSYCHIATRIC: A&O x 3.  Garrulous.  Mood & affect appropriate to situation  NEUROLOGICAL: Non-focal, BAPTISTE x 4 against gravity  VASCULAR: Peripheral pulses palpable

## 2025-01-28 NOTE — ED ADULT TRIAGE NOTE - CHIEF COMPLAINT QUOTE
pt c/o of sudden onset of dizziness and vomiting since this afternoon, pt c/o of chest discomfort as well, onset 45min ago

## 2025-01-28 NOTE — H&P ADULT - NSHPLABSRESULTS_GEN_ALL_CORE
LAB-WORK/STUDIES:                          12.5   10.26 )-----------( 175      ( 28 Jan 2025 18:16 )             39.2     139  |  104  |  22  ----------------------------<  123[H]     01-28  4.6   |  23  |  1.47[H]    Ca    9.1      28 Jan 2025 18:16    TPro  7.0  /  Alb  4.2  /  TBili  0.4  /  DBili  x   /  AST  24  /  ALT  23  /  AlkPhos  63  01-28    PT/INR: 11.8/0.99 (01-28-25 @ 18:16)  PTT: 18.2 (01-28-25 @ 18:16)    19:08 - VBG - pH: 7.32  | pCO2: 47    | pO2: 52    | Lactate: 1.4      hs Troponin, T - 23 ng/L (01-28-25 @ 18:16) LAB-WORK/STUDIES:                          12.5   10.26 )-----------( 175      ( 28 Jan 2025 18:16 )             39.2     139  |  104  |  22  ----------------------------<  123[H]     01-28  4.6   |  23  |  1.47[H]    Ca    9.1      28 Jan 2025 18:16    TPro  7.0  /  Alb  4.2  /  TBili  0.4  /  DBili  x   /  AST  24  /  ALT  23  /  AlkPhos  63  01-28    PT/INR: 11.8/0.99 (01-28-25 @ 18:16)  PTT: 18.2 (01-28-25 @ 18:16)    19:08 - VBG - pH: 7.32  | pCO2: 47    | pO2: 52    | Lactate: 1.4      hs Troponin, T - 23 ng/L (01-28-25 @ 18:16)    =================================================    ECG = Sinus rhythm w/ 1st degree AVB at 85 bpm,   QTc = 435    =================================================  .

## 2025-01-28 NOTE — H&P ADULT - ASSESSMENT
[ x ]  Lab studies personally reviewed  [ x ]  Radiology personally reviewed  [ x ]  Old records personally reviewed    84-year-old male, with past history significant for CVA - without residual deficits, Asthma, Hypertension, Dyslipidemia, and Type-2 DM, presented to the ED secondary acute of dizziness.  Diagnosed with Dizziness in the ED.

## 2025-01-28 NOTE — ED ADULT NURSE NOTE - NSFALLRISKINTERV_ED_ALL_ED

## 2025-01-28 NOTE — H&P ADULT - NSHPREVIEWOFSYSTEMS_GEN_ALL_CORE
REVIEW OF SYSTEMS:    CONSTITUTIONAL: Acute onset of dizziness w/ room spinning sensation.  Headache.  ?(+) chills.  No weakness, fever or sweating  EYES/ENT: Tinnitus.  No visual changes.  No dysphagia  NECK: No pain or stiffness  RESPIRATORY: No cough or hemoptysis.  No shortness of breath  CARDIOVASCULAR: No chest pain or palpitations.  No lower extremity edema  GASTROINTESTINAL: No epigastric or abdominal pain. No nausea, vomiting or hematemesis.  No diarrhea or constipation. No melena or hematochezia.  GENITOURINARY: No dysuria, frequency or hematuria  MUSCULOSKELETAL: No joint pain, swelling, decreased ROM, erythema, warmth  NEUROLOGICAL: No numbness or weakness  PSYCHIATRY: Depression due to wife's death over one year ago, after 60  years of marriage.  No anxiety  SKIN: No itching, burning, rashes, or lesions   All other review of systems is negative unless indicated above.

## 2025-01-28 NOTE — ED PROVIDER NOTE - DATE/TIME 1
CRITICAL CARE PROGRESS NOTE      Patient:  Jimena Leo    Unit/Bed:3A-09/009-A  YOB: 1934  MRN: [id number]   PCP: SERGE Gifford CNP  Date of Admission: 10/11/2021  Chief Complaint:- Worsening shortness of breath    Assessment and Plan:    1. Acute hypoxic respiratory failure: Patient was admitted to the hospital through the ED on 10/11/2021 and started on high flow. Patient's hypoxemia progressively worsened. Intubated and admitted to ICU on 10/15.  - Patient has had multiple failed breathing trials. There was some suspicion for neuromuscular disease present dt persistent hypercarbia despite mechanical ventilatory support. Work up negative for Myasthenia Gravis. Family is agreeable to trach and peg placement d/t severe respiratory muscle weakness making ventilatory capacity a barrier for extubation. Trach placed today. Current vent settings: PCV rate 16, PIP 24, peep 6, Fio2 30% spo2 97%    2. Covid Pneumonia with superimposed bacterial pneumonia:  S/p baricitinib   10/19 -respiratory culture positive for Corynebacterium. Vancomycin 6 days. Currently pending pneumonia panel ordered on 10/29, the one on 10/26 was negative. . Repeat CXR shows improving bilateral pna. Patient currently not on any abx.  no leukocytosis present. 3. ARDS: Mild. Patient received oxygen with FiO2 100 with very low TV (250ml) on ventilation. Ferritin, CRP, Lactic dehydrogenase elevated. Started Decadron, Baricitinib (10/12). Trach placed today. 4. Hypotension - patient requiring levo 1-2 throughout the day. Currently off levo. Continue to monitor. 6. HFrEF: Echo (10/11): LV EF 25-30%, LV severe global hypokinesis. LV size moderate increase. Weight on admission 120 lbs. Cardiologist consult appreciated - cardiology will wait for recovery prior to considering any intervention such as LifeVest versus AICD. D/c milrinone on 11/8      7. Hx of Prolong QTc interval: On telemetry strip in ED.  Seen by cardiogy for Prolong QTC. Plan: tele monitor, avoid QTC prolong medications, Keep K +> 4 and Mg > 2     8. New on set Afib with RVR: since admission patient had episodes of afib without rvr. 11/10 patient developed afib with RVR. After amio bolus given, patient sustained rate of 150s. Patient underwent synchronized cardioversion at 200J in which patient returned to NSR rate 100. Continue on amio drip, heparin drip. Continue to monitor. 9. Bilateral lower extremity swelling: r/o DVT - US negative on 11/1    10. HERIBERTO: Bun/Cr Improving. Patient having good urine output. Nephrology continue Bumex and adjust as clinically appropriate. 11. Hyperkalemia: Resolved. 12. AMS: likely secondary to hypercapnic hypoxemia. Neuro exam when patient is extubate     13. Macrocytic Anemia: H/H 7.9/26.1 .4. Ordered folate and b12 levels, came back WNL. TSH WNL. Most likely dt covid/pulmonary dz. 14. Hematuria: Secondary to nephrolithiasis. resolved      INITIAL H AND P AND ICU COURSE:   78-year-old female presents to the ED on 10/11/2021 due to worsening shortness of breath. Patient tested positive for Covid on 10/4/2021. In the emergency room patient was found to have a SPO2 of 83% on room air with a BNP of 15.7K. Patient was admitted and managed for acute hypoxic respiratory failure secondary to Covid with combination of congestive heart failure. Patient was placed on nasal cannula, Bumex and dexamethasone in the ED. On 10/15 patient was transferred to the ICU and intubated for acute hypoxic respiratory failure on 100% FiO2 on high flow. Patient had a UTI on 10/18 and was started on ceftriaxone on 10/19. Patient was also started on milrinone for her HFrEF. Patient's family has been consulted regarding trach and PEG tube in which they are agreeable. Cardiology is on board regarding LifeVest versus AICD. 11/5 - patient HERIBERTO improving, continue to be followed by nephro.  Repeat cxr shows improving of bilateral pna. Pending pneumonia panel results. Trach and peg set for Monday. 11/6 - patient HERIBERTO improving and seen by nephro. Resume bumex. Patient otherwise stable on vent pending trach Monday. 11/7 - no acute events overnight. Pt trach planned for tomorrow. Added pepcid for GI prophylaxis      11/8 - trach scheduled today. Pt bp soft with map 63. D/t patient HFrEF, hold fluids for now. nephro on board. 11/9 - eventual trach. Requiring 3 levo over night, now currently on 1 levo. Episode of afib without rvr over night, currently nsr with ventricular bigeminy. Night team started on heparin drip for anticoagulation. Continue to monitor rate / rhythm. 11/10 - bronchoscopy and percutaneous trach placement today. Patient entered Afib with RVR. Given amio bolus. After bolus, patient sustained rate of 150s. Patient underwent synchronized cardioversion at 200J in which patient returned to NSR rate 100. Continue on amio drip, heparin drip. Continue to monitor. Patient developing fever. Hypotensive needing levo. No leukocytosis WBC 5.8. PNA panel ordered today negative. Pending resp cultures. Past Medical History: HPI  Family History:  See EMR  Social History:   Former smoker since 1977. 15 pack year hx prior to cessation    ROS   Cannot obtain d/t patient sedated and on mechanical ventilation    Scheduled Meds:   midazolam        morphine        lidocaine-EPINEPHrine  20 mL IntraDERmal Once    lidocaine 1 % injection  5 mL IntraDERmal Once    sodium chloride flush  5-40 mL IntraVENous 2 times per day    famotidine (PEPCID) injection  20 mg IntraVENous Daily    bumetanide  1 mg IntraVENous Daily    lactobacillus  1 capsule Per NG tube Daily with breakfast    potassium (CARDIAC) replacement protocol   Other RX Placeholder    senna  10 mL Per NG tube Nightly    [Held by provider] lactulose  20 g Oral TID    [Held by provider] metoprolol tartrate  25 mg Oral BID    [Held by provider] apixaban 2.5 mg Oral BID     Continuous Infusions:   amiodarone      Followed by    amiodarone      heparin (PORCINE) Infusion 14 Units/kg/hr (11/10/21 1412)    sodium chloride      midazolam Stopped (11/10/21 1146)    dextrose 100 mL/hr (10/26/21 1253)    dexmedetomidine 0.5 mcg/kg/hr (11/10/21 1450)    norepinephrine Stopped (11/10/21 1357)    [Held by provider] sodium chloride Stopped (10/19/21 1900)       PHYSICAL EXAMINATION:  T:  102 F.  P:  100  RR:  21 B/P:  94/62  FiO2:  30. O2 Sat:  94  I/O:  466.4/ 700 net -233.6   Body mass index is 30.31 kg/m². GCS:   5  PCV+: Rate 16, PIP 24, PEEP 6    Sedation: precedex 0.5 mcg  Drips: hepatin 14 units/kg/hr      General: Sedated on vent  HEENT:  normocephalic and atraumatic. No scleral icterus. PERR  Neck: supple. No Thyromegaly. Lungs: diminished breath sounds bilaterally on auscultation. No retractions  Cardiac: RRR. No JVD. Abdomen: soft. Nontender. Extremities:  No clubbing, cyanosis, mild pitting edema x 4, more prominent in bilateral lower extremities    Vasculature: capillary refill < 3 seconds. Palpable dorsalis pedis pulses. Skin:  warm and dry. Sacral decubitus ulcer  Psych:  Patient sedated  Lymph:  No supraclavicular adenopathy. Neurologic: No seizures. Data: (All radiographs, tracings, PFTs, and imaging are personally viewed and interpreted unless otherwise noted).  Sodium 142, potassium 3.4, chloride 104, CO2 29, BUN 42, creatinine 1.1   Glucose 130   WBC 5.8, hemoglobin 7.7, hematocrit 25.3 platelets 292   Telemetry shows NSR after synchronized cardioversion for Afib with RVR      CXR on 11/10:   1. There is a new tracheotomy tube. There has been removal of the enteric tube and endotracheal tube. 2.. There is no change in the right internal jugular line with the tip in the superior vena cava. 3. There is cardiomegaly. 4. There is bilateral pulmonary opacification, slightly worse than on previous study.        Seen with multidisciplinary ICU team   Meets Continued ICU Level Care Criteria:    [x] Yes   [] No - Transfer Planned to listed location:  [] HOSPITALIST CONTACTED-      Case and plan discussed with Dr. Jenaro Borjas    Electronically signed by Zeke Yoon MD  [address]  Patient seen by me. Case discussed with resident physician. Patient with fever up to 102. Repeat cultures. Bronchoscopy completed 11/10/2021 showed bilateral lower lobe mucous plugging. Culture pending. PCR negative. Tracheostomy tube placed 11/10/2021. .  Italicized font represents changes to the note made by me. CC time 35 minutes. Time was discontiguous. Time does not include procedures. Time does include my direct assessment of the patient and coordination of care.   Electronically signed by Genesis Dickey MD on 11/10/2021 at 4:46 PM 28-Jan-2025 19:00

## 2025-01-28 NOTE — ED ADULT NURSE NOTE - OBJECTIVE STATEMENT
A&Ox3. Past medical history of DM, HLD, CVA and asthma. Pt presents to the ED today as a possible stroke code. Pt LKW was 1530 and the Pt began to experience dizziness, vomiting and unsteady gait at 1630. Pt assessed by provider and neurologist upon arrival to the ED. Pt gait unsteady and actively vomiting while being assessed. Pt cooperative with questions asked of him and able to follow directions. TNK not used. Denies CP, SOB or recent sick contact or travel. Respirations even and unlabored. 18  gauge IV placed in right AC. Labs obtained. Diagnostic testing completed. Safety precautions in place. Awaiting disposition.

## 2025-01-28 NOTE — H&P ADULT - PROBLEM SELECTOR PLAN 3
Dry lips, dry oral mucosa, hemoconcentrated lab-work  Report of decreased oral intake today  - prescribing IVF of  mL bolus, followed by LR at 100 mL/Hr x 5 hours  - encourage oral hydration, as tolerated  - ensure optimal hydration  - f/u electrolytes

## 2025-01-29 ENCOUNTER — TRANSCRIPTION ENCOUNTER (OUTPATIENT)
Age: 85
End: 2025-01-29

## 2025-01-29 ENCOUNTER — RESULT REVIEW (OUTPATIENT)
Age: 85
End: 2025-01-29

## 2025-01-29 VITALS
OXYGEN SATURATION: 98 % | TEMPERATURE: 97 F | HEART RATE: 82 BPM | RESPIRATION RATE: 18 BRPM | DIASTOLIC BLOOD PRESSURE: 76 MMHG | SYSTOLIC BLOOD PRESSURE: 147 MMHG

## 2025-01-29 DIAGNOSIS — Z91.89 OTHER SPECIFIED PERSONAL RISK FACTORS, NOT ELSEWHERE CLASSIFIED: ICD-10-CM

## 2025-01-29 DIAGNOSIS — E11.9 TYPE 2 DIABETES MELLITUS WITHOUT COMPLICATIONS: ICD-10-CM

## 2025-01-29 DIAGNOSIS — Z29.9 ENCOUNTER FOR PROPHYLACTIC MEASURES, UNSPECIFIED: ICD-10-CM

## 2025-01-29 DIAGNOSIS — E87.5 HYPERKALEMIA: ICD-10-CM

## 2025-01-29 DIAGNOSIS — I10 ESSENTIAL (PRIMARY) HYPERTENSION: ICD-10-CM

## 2025-01-29 DIAGNOSIS — R09.89 OTHER SPECIFIED SYMPTOMS AND SIGNS INVOLVING THE CIRCULATORY AND RESPIRATORY SYSTEMS: ICD-10-CM

## 2025-01-29 DIAGNOSIS — R42 DIZZINESS AND GIDDINESS: ICD-10-CM

## 2025-01-29 DIAGNOSIS — E86.0 DEHYDRATION: ICD-10-CM

## 2025-01-29 LAB
24R-OH-CALCIDIOL SERPL-MCNC: 22.8 NG/ML — LOW (ref 30–80)
25(OH)D3 SERPL-MCNC: 24 NG/ML
A1C WITH ESTIMATED AVERAGE GLUCOSE RESULT: 6.3 % — HIGH (ref 4–5.6)
ADD ON TEST-SPECIMEN IN LAB: SIGNIFICANT CHANGE UP
ALBUMIN SERPL ELPH-MCNC: 4.2 G/DL
ALP BLD-CCNC: 69 U/L
ALT SERPL-CCNC: 20 U/L
ANION GAP SERPL CALC-SCNC: 11 MMOL/L — SIGNIFICANT CHANGE UP (ref 7–14)
ANION GAP SERPL CALC-SCNC: 13 MMOL/L
ANION GAP SERPL CALC-SCNC: 13 MMOL/L — SIGNIFICANT CHANGE UP (ref 7–14)
AST SERPL-CCNC: 23 U/L
BASOPHILS # BLD AUTO: 0.07 K/UL
BASOPHILS NFR BLD AUTO: 0.8 %
BILIRUB SERPL-MCNC: 0.5 MG/DL
BUN SERPL-MCNC: 17 MG/DL
BUN SERPL-MCNC: 19 MG/DL — SIGNIFICANT CHANGE UP (ref 7–23)
CALCIUM SERPL-MCNC: 8.9 MG/DL — SIGNIFICANT CHANGE UP (ref 8.4–10.5)
CALCIUM SERPL-MCNC: 9.3 MG/DL
CHLORIDE SERPL-SCNC: 103 MMOL/L
CHLORIDE SERPL-SCNC: 103 MMOL/L — SIGNIFICANT CHANGE UP (ref 98–107)
CHLORIDE SERPL-SCNC: 104 MMOL/L — SIGNIFICANT CHANGE UP (ref 98–107)
CHOLEST SERPL-MCNC: 149 MG/DL — SIGNIFICANT CHANGE UP
CHOLEST SERPL-MCNC: 175 MG/DL
CO2 SERPL-SCNC: 20 MMOL/L — LOW (ref 22–31)
CO2 SERPL-SCNC: 22 MMOL/L
CREAT SERPL-MCNC: 1.2 MG/DL — SIGNIFICANT CHANGE UP (ref 0.5–1.3)
CREAT SERPL-MCNC: 1.41 MG/DL
CREAT SPEC-SCNC: 150 MG/DL
EGFR: 49 ML/MIN/1.73M2
EGFR: 60 ML/MIN/1.73M2 — SIGNIFICANT CHANGE UP
EOSINOPHIL # BLD AUTO: 0.36 K/UL
EOSINOPHIL NFR BLD AUTO: 4.2 %
ESTIMATED AVERAGE GLUCOSE: 134 — SIGNIFICANT CHANGE UP
ESTIMATED AVERAGE GLUCOSE: 140 MG/DL
FOLATE SERPL-MCNC: 16 NG/ML
GLUCOSE BLDC GLUCOMTR-MCNC: 103 MG/DL — HIGH (ref 70–99)
GLUCOSE BLDC GLUCOMTR-MCNC: 107 MG/DL — HIGH (ref 70–99)
GLUCOSE BLDC GLUCOMTR-MCNC: 97 MG/DL — SIGNIFICANT CHANGE UP (ref 70–99)
GLUCOSE SERPL-MCNC: 100 MG/DL
GLUCOSE SERPL-MCNC: 94 MG/DL — SIGNIFICANT CHANGE UP (ref 70–99)
HBA1C MFR BLD HPLC: 6.5 %
HCT VFR BLD CALC: 36.7 % — LOW (ref 39–50)
HCT VFR BLD CALC: 41.2 %
HDLC SERPL-MCNC: 59 MG/DL — SIGNIFICANT CHANGE UP
HDLC SERPL-MCNC: 63 MG/DL
HGB BLD-MCNC: 11.8 G/DL — LOW (ref 13–17)
HGB BLD-MCNC: 13 G/DL
IMM GRANULOCYTES NFR BLD AUTO: 0.2 %
LDLC SERPL CALC-MCNC: 91 MG/DL
LIPID PNL WITH DIRECT LDL SERPL: 77 MG/DL — SIGNIFICANT CHANGE UP
LYMPHOCYTES # BLD AUTO: 1.08 K/UL
LYMPHOCYTES NFR BLD AUTO: 12.7 %
MAGNESIUM SERPL-MCNC: 1.9 MG/DL — SIGNIFICANT CHANGE UP (ref 1.6–2.6)
MAN DIFF?: NORMAL
MCHC RBC-ENTMCNC: 29.1 PG — SIGNIFICANT CHANGE UP (ref 27–34)
MCHC RBC-ENTMCNC: 29.4 PG
MCHC RBC-ENTMCNC: 31.6 G/DL
MCHC RBC-ENTMCNC: 32.2 G/DL — SIGNIFICANT CHANGE UP (ref 32–36)
MCV RBC AUTO: 90.6 FL — SIGNIFICANT CHANGE UP (ref 80–100)
MCV RBC AUTO: 93.2 FL
MICROALBUMIN 24H UR DL<=1MG/L-MCNC: 60.3 MG/DL
MICROALBUMIN/CREAT 24H UR-RTO: 401 MG/G
MONOCYTES # BLD AUTO: 1.01 K/UL
MONOCYTES NFR BLD AUTO: 11.9 %
NEUTROPHILS # BLD AUTO: 5.94 K/UL
NEUTROPHILS NFR BLD AUTO: 70.2 %
NON HDL CHOLESTEROL: 90 MG/DL — SIGNIFICANT CHANGE UP
NONHDLC SERPL-MCNC: 113 MG/DL
NRBC # BLD AUTO: 0 K/UL — SIGNIFICANT CHANGE UP (ref 0–0)
NRBC # BLD: 0 /100 WBCS — SIGNIFICANT CHANGE UP (ref 0–0)
NRBC # FLD: 0 K/UL — SIGNIFICANT CHANGE UP (ref 0–0)
NRBC BLD-RTO: 0 /100 WBCS — SIGNIFICANT CHANGE UP (ref 0–0)
PHOSPHATE SERPL-MCNC: 3.8 MG/DL — SIGNIFICANT CHANGE UP (ref 2.5–4.5)
PLATELET # BLD AUTO: 158 K/UL — SIGNIFICANT CHANGE UP (ref 150–400)
PLATELET # BLD AUTO: 177 K/UL
POTASSIUM SERPL-MCNC: 4.4 MMOL/L — SIGNIFICANT CHANGE UP (ref 3.5–5.3)
POTASSIUM SERPL-MCNC: 5.7 MMOL/L — HIGH (ref 3.5–5.3)
POTASSIUM SERPL-SCNC: 4.4 MMOL/L — SIGNIFICANT CHANGE UP (ref 3.5–5.3)
POTASSIUM SERPL-SCNC: 5.1 MMOL/L
POTASSIUM SERPL-SCNC: 5.7 MMOL/L — HIGH (ref 3.5–5.3)
PROT SERPL-MCNC: 6.5 G/DL
PSA FREE FLD-MCNC: 17 %
PSA FREE SERPL-MCNC: 0.39 NG/ML
PSA SERPL-MCNC: 2.25 NG/ML
RBC # BLD: 4.05 M/UL — LOW (ref 4.2–5.8)
RBC # BLD: 4.42 M/UL
RBC # FLD: 12.5 % — SIGNIFICANT CHANGE UP (ref 10.3–14.5)
RBC # FLD: 12.7 %
SODIUM SERPL-SCNC: 135 MMOL/L — SIGNIFICANT CHANGE UP (ref 135–145)
SODIUM SERPL-SCNC: 137 MMOL/L — SIGNIFICANT CHANGE UP (ref 135–145)
SODIUM SERPL-SCNC: 138 MMOL/L
TRIGL SERPL-MCNC: 122 MG/DL
TRIGL SERPL-MCNC: 62 MG/DL — SIGNIFICANT CHANGE UP
TROPONIN T, HIGH SENSITIVITY RESULT: 23 NG/L — SIGNIFICANT CHANGE UP
VIT B12 SERPL-MCNC: 934 PG/ML
WBC # BLD: 6.87 K/UL — SIGNIFICANT CHANGE UP (ref 3.8–10.5)
WBC # FLD AUTO: 6.87 K/UL — SIGNIFICANT CHANGE UP (ref 3.8–10.5)
WBC # FLD AUTO: 8.48 K/UL

## 2025-01-29 PROCEDURE — 99239 HOSP IP/OBS DSCHRG MGMT >30: CPT

## 2025-01-29 PROCEDURE — 93308 TTE F-UP OR LMTD: CPT | Mod: 26,GC

## 2025-01-29 RX ORDER — LOSARTAN POTASSIUM 25 MG/1
25 TABLET, FILM COATED ORAL
Qty: 1 | Refills: 0 | Status: ACTIVE | COMMUNITY
Start: 2025-01-29 | End: 1900-01-01

## 2025-01-29 RX ORDER — DM/PSEUDOEPHED/ACETAMINOPHEN 10-30-250
25 CAPSULE ORAL ONCE
Refills: 0 | Status: DISCONTINUED | OUTPATIENT
Start: 2025-01-29 | End: 2025-01-29

## 2025-01-29 RX ORDER — ERGOCALCIFEROL 1.25 MG/1
1.25 MG CAPSULE, LIQUID FILLED ORAL
Qty: 12 | Refills: 0 | Status: ACTIVE | COMMUNITY
Start: 2025-01-29 | End: 1900-01-01

## 2025-01-29 RX ORDER — SODIUM CHLORIDE 9 G/ML
1000 INJECTION, SOLUTION INTRAVENOUS
Refills: 0 | Status: DISCONTINUED | OUTPATIENT
Start: 2025-01-29 | End: 2025-01-29

## 2025-01-29 RX ORDER — ALBUTEROL 90 MCG
2 AEROSOL REFILL (GRAM) INHALATION EVERY 6 HOURS
Refills: 0 | Status: DISCONTINUED | OUTPATIENT
Start: 2025-01-29 | End: 2025-01-29

## 2025-01-29 RX ORDER — DM/PSEUDOEPHED/ACETAMINOPHEN 10-30-250
15 CAPSULE ORAL ONCE
Refills: 0 | Status: DISCONTINUED | OUTPATIENT
Start: 2025-01-29 | End: 2025-01-29

## 2025-01-29 RX ORDER — INSULIN LISPRO 100/ML
VIAL (ML) SUBCUTANEOUS AT BEDTIME
Refills: 0 | Status: DISCONTINUED | OUTPATIENT
Start: 2025-01-29 | End: 2025-01-29

## 2025-01-29 RX ORDER — ATORVASTATIN CALCIUM 80 MG/1
80 TABLET, FILM COATED ORAL AT BEDTIME
Refills: 0 | Status: DISCONTINUED | OUTPATIENT
Start: 2025-01-29 | End: 2025-01-29

## 2025-01-29 RX ORDER — PANTOPRAZOLE 20 MG/1
40 TABLET, DELAYED RELEASE ORAL
Refills: 0 | Status: DISCONTINUED | OUTPATIENT
Start: 2025-01-29 | End: 2025-01-29

## 2025-01-29 RX ORDER — GLUCAGON 3 MG/1
1 POWDER NASAL ONCE
Refills: 0 | Status: DISCONTINUED | OUTPATIENT
Start: 2025-01-29 | End: 2025-01-29

## 2025-01-29 RX ORDER — ASPIRIN 81 MG/1
1 TABLET, COATED ORAL
Qty: 30 | Refills: 0
Start: 2025-01-29 | End: 2025-02-27

## 2025-01-29 RX ORDER — DM/PSEUDOEPHED/ACETAMINOPHEN 10-30-250
12.5 CAPSULE ORAL ONCE
Refills: 0 | Status: DISCONTINUED | OUTPATIENT
Start: 2025-01-29 | End: 2025-01-29

## 2025-01-29 RX ORDER — INSULIN LISPRO 100/ML
VIAL (ML) SUBCUTANEOUS
Refills: 0 | Status: DISCONTINUED | OUTPATIENT
Start: 2025-01-29 | End: 2025-01-29

## 2025-01-29 RX ORDER — SODIUM ZIRCONIUM CYCLOSILICATE 5 G/5G
10 POWDER, FOR SUSPENSION ORAL ONCE
Refills: 0 | Status: COMPLETED | OUTPATIENT
Start: 2025-01-29 | End: 2025-01-29

## 2025-01-29 RX ORDER — AMLODIPINE BESYLATE 5 MG
1 TABLET ORAL
Qty: 30 | Refills: 0
Start: 2025-01-29 | End: 2025-02-27

## 2025-01-29 RX ADMIN — Medication 100 MILLILITER(S): at 00:12

## 2025-01-29 RX ADMIN — SODIUM ZIRCONIUM CYCLOSILICATE 10 GRAM(S): 5 POWDER, FOR SUSPENSION ORAL at 00:11

## 2025-01-29 RX ADMIN — ASPIRIN 81 MILLIGRAM(S): 81 TABLET, COATED ORAL at 12:16

## 2025-01-29 RX ADMIN — Medication 5000 UNIT(S): at 05:50

## 2025-01-29 RX ADMIN — Medication 2000 UNIT(S): at 16:20

## 2025-01-29 NOTE — DISCHARGE NOTE PROVIDER - PROVIDER TOKENS
PROVIDER:[TOKEN:[[id number]],FOLLOWUP:[1 month]],PROVIDER:[TOKEN:[[id number]],FOLLOWUP:[1 month]]

## 2025-01-29 NOTE — PROGRESS NOTE ADULT - PROBLEM SELECTOR PLAN 8
Lovenox  Per neurology , pt is ok for DC home and can follow up with neurology Dr Moscoso as outpt , case d/w neurology   Patient hemodynamically stable for discharge home  Time spent in discharge process is 35 min

## 2025-01-29 NOTE — CHART NOTE - NSCHARTNOTEFT_GEN_A_CORE
ED NP Note      Patient : Godfrey Rose Age: 39year old Sex: male   MRN: [id number] Encounter Date: 5/3/2022      History     Chief Complaint   Patient presents with   â¢ Thumb Injury     HPI  42-year-old  male patient presents with complaints of left thumb injury. He tells me just prior to arrival he tripped on his daughter's toy causing him to fall backwards with his hand behind him. Patient tells me he feels most of the pain in his thumb which radiates up his forearm. He has no numbness or tingling. Patient is right-hand dominant. Patient is awake, alert and oriented x4. Nontoxic. Not in any distress. No Known Allergies    Past Medical History:   Diagnosis Date   â¢ Asthma    â¢ Bipolar disorder (CMS/HCC)    â¢ Blood clot associated with vein wall inflammation    â¢ Rotator cuff arthropathy        Past Surgical History:   Procedure Laterality Date   â¢ LUMBAR FUSION     â¢ REMOVAL GALLBLADDER         Family History   Problem Relation Age of Onset   â¢ Patient is unaware of any medical problems Mother    â¢ Patient is unaware of any medical problems Father        Social History     Tobacco Use   â¢ Smoking status: Current Every Day Smoker     Packs/day: 1.00     Years: 35.00     Pack years: 35.00   â¢ Smokeless tobacco: Never Used   Vaping Use   â¢ Vaping Use: never used   Substance Use Topics   â¢ Alcohol use: Never   â¢ Drug use: Never       Review of Systems   Constitutional: Negative for chills, fatigue and fever. HENT: Negative for congestion, ear pain and sore throat. Respiratory: Negative for shortness of breath. Cardiovascular: Negative for chest pain. Gastrointestinal: Negative for abdominal distention, diarrhea and vomiting. Musculoskeletal:        Left hand/thumb pain   Skin: Negative for rash. Neurological: Negative for dizziness, weakness and numbness.        Physical Exam     ED Triage Vitals [05/03/22 1509]   ED Triage Vitals Group      Temp 97.7 Â°F (36.5 Â°C)      Heart Rate 76 "Resp 18      BP (!) 145/84      SpO2 100 %      EtCO2 mmHg       Height 5' 3"" (1.6 m)      Weight 130 lb (59 kg)      Weight Scale Used       BMI (Calculated) 23.03      IBW/kg (Calculated) 56.9       Pulse Ox is [100] on Room Air which is [normal] for this patient    Physical Exam  Vitals and nursing note reviewed. Constitutional:       General: He is not in acute distress. Appearance: Normal appearance. He is normal weight. He is not ill-appearing, toxic-appearing or diaphoretic. HENT:      Head: Normocephalic. Right Ear: Tympanic membrane, ear canal and external ear normal.      Left Ear: Tympanic membrane, ear canal and external ear normal.      Nose: Nose normal.      Mouth/Throat:      Mouth: Mucous membranes are moist.      Neck: Normal range of motion and neck supple. Eyes:      Extraocular Movements: Extraocular movements intact. Pupils: Pupils are equal, round, and reactive to light. Cardiovascular:      Rate and Rhythm: Normal rate. Pulses: Normal pulses. Pulmonary:      Effort: Pulmonary effort is normal.      Breath sounds: Normal breath sounds. Abdominal:      General: Abdomen is flat. Bowel sounds are normal.      Palpations: Abdomen is soft. Musculoskeletal:         General: Tenderness and signs of injury present. No swelling or deformity. Normal range of motion. Left lower leg: No edema. Comments: Tenderness to left thumb and wrist.  No erythema, edema or ecchymosis. No obvious deformities. All joints are intact to flexion-extension with without resistance of thumb. Remainder the fingers are nontender with full strength and range of motion. Sensation intact to hand and entire arm. Skin:     General: Skin is warm and dry. Capillary Refill: Capillary refill takes less than 2 seconds. Coloration: Skin is not jaundiced or pale. Findings: No bruising, erythema, lesion or rash. Neurological:      General: No focal deficit present.       " Notified by Neurology CANDY Godinez that pt is cleared for discharge. MR franck and MR C-spine can be obtained ouptatient and do not need to delay DC.  Pt to follow up with Dr. Moscoso OP in 1 month.  Dr. Hastings made aware and agrees to plan.    ANDRIA StevenC  pager [phone] Mental Status: He is alert and oriented to person, place, and time. Psychiatric:         Mood and Affect: Mood normal.         ED Course   A: A nontoxic 54-year-old male patient presents with complaints of left thumb pain after experiencing a fall just prior to arrival.  He tells me he tripped over his daughter's toy causing him to fall backwards with his hand reached behind him. He did not hit his head or experience LOC. Denies incurring any other injuries. He mostly feels the pain to his thumb. He is right-hand dominant. Patient was medicated with Tylenol for comfort. Will obtain x-rays. Discussed MS injuries. XR L hand  Noted is mild to juxta-articular osteopenia, greater at  the MCP joints. There is no evidence acute fracture acute subluxation. Early DJD is suspected at 1st and 2nd MCP joint and PIP joints. Â R L forearm  There is no acute fracture of left forearm. No  radiopaque foreign bodies are seen. Patient was placed in a cock-up splint for comfort. He was advised to follow-up with orthopedics. CMS remained intact before, during and after application of splint. Â     Red flag items/return criteria discussed. Patient agrees to return with any new or worsening symptoms. P: DISCUSSION OF PLAN AND IMPORTANCE OF FOLLOW UP CARE:  The plan was discussed verbally and key components were summarized in the discharge instructions. All questions were answered. In discussing management of follow-up, they are advised that the plan is based only on the information that was available at the time of emergency department evaluation. Additional testing and treatment may be necessary, and outpatient evaluation is frequently required to ensure complete care. At the same time, there is always potential for symptoms to recur or worsen, or for additional signs and symptoms to develop that could substantially affect the treatment plan.   If new or uncontrolled symptoms occur, or there are any other concerns, they are advised to seek medical evaluation immediately. Procedures    Lab Results     No results found for this visit on 05/03/22. EKG Results   [EKG]    Cardiac Monitor [N]    Radiology Results     Imaging Results          XR HAND MIN 3 VIEWS LEFT (Final result)  Result time 05/03/22 16:32:22    Final result                 Impression:    RESULTS/IMPRESSION: Noted is mild to juxta-articular osteopenia, greater at  the MCP joints. There is no evidence acute fracture acute subluxation. Early DJD is suspected at 1st and 2nd MCP joint and PIP joints. Electronically Signed by: Vimal Guardado MD   Signed on: 5/3/2022 4:32 PM                Narrative:    Procedure: XR HAND MIN 3 VIEWS LEFT    COMPARISON: 02/19/2019. INDICATIONS: injury, left hand pain    TECHNIQUES: Three views of left hand were obtained. XR FOREARM 2 VIEWS LEFT (Final result)  Result time 05/03/22 16:31:11   Procedure changed from XR FOREARM 3 VIEWS LEFT     Final result                 Impression:    RESULTS/IMPRESSION: There is no acute fracture of left forearm. No  radiopaque foreign bodies are seen. Electronically Signed by: Vimal Guardado MD   Signed on: 5/3/2022 4:31 PM                Narrative:    Procedure: XR FOREARM 2 VIEWS LEFT    COMPARISON: None. INDICATIONS: injury, left forearm pain    TECHNIQUES: Two views of left forearm were obtained. ED Medication Orders (From admission, onward)    Ordered Start     Status Ordering Provider    05/03/22 1557 05/03/22 1600  acetaminophen (TYLENOL) tablet 1,000 mg  ONCE         Last MAR action: Given PHILLY TODD         Clinical Impression     ED Diagnosis   1. Osteopenia of left hand     2. Osteoarthritis of left hand, unspecified osteoarthritis type     3.  Hand strain, left, initial encounter         Disposition        There is no disposition no dispo time  There is no comment    New Prescriptions    No medications on file       Binh Soares CNP   5/3/2022 4:01 PM                      Binh Soares CNP  05/03/22 2876

## 2025-01-29 NOTE — DISCHARGE NOTE NURSING/CASE MANAGEMENT/SOCIAL WORK - PATIENT PORTAL LINK FT
You can access the FollowMyHealth Patient Portal offered by [facility] by registering at the following website: [email or web address]. By joining [facility]’s FollowMyHealth portal, you will also be able to view your health information using other applications (apps) compatible with our system.

## 2025-01-29 NOTE — DISCHARGE NOTE PROVIDER - NSDCFUSCHEDAPPT_GEN_ALL_CORE_FT
Lashanda Huynh  [facility] Physician [facility]  PULMMED [address] [place] [address]  Scheduled Appointment: 02/10/2025    [facility]  GERIATRICS [address] [place] [address]   Scheduled Appointment: 02/24/2025

## 2025-01-29 NOTE — DISCHARGE NOTE PROVIDER - CARE PROVIDER_API CALL
Nanette Moscoso  Neurology  [address], [address]  [place] [address]  Phone: [phone]  Fax: [phone]  Follow Up Time: 1 month    Richard Brownlee  Geriatric Medicine  [address], [address]  [place] [address]  Phone: [phone]  Fax: [phone]  Follow Up Time: 1 month

## 2025-01-29 NOTE — PROGRESS NOTE ADULT - PROBLEM SELECTOR PLAN 2
Acute onset while standing after completing an hour long "breathing test"  Room spinning sensation and worsened over time  History of CVA w/o residual deficits  CT findings as above (see reports)  Could have been impacted by dehydration; now resolved   No gross signs of infection  ECG as above; no sign of A-fib  - f/u MRI of the brain - can be done as outpatient   -  TTE with Bubble study negative for intracardiac shunt   - Neurology Team following (appreciated)  - , vitamin D levels low, will supplement   - fall precautions  - Physical Therapy eval- no skilled PT needs

## 2025-01-29 NOTE — PROGRESS NOTE ADULT - SUBJECTIVE AND OBJECTIVE BOX
Patient is a 84y old  Male who presents with a chief complaint of Suspected cerebrovascular accident, Dizziness (28 Jan 2025 22:04)      SUBJECTIVE / OVERNIGHT EVENTS:    MEDICATIONS  (STANDING):  aspirin enteric coated 81 milliGRAM(s) Oral daily  atorvastatin 80 milliGRAM(s) Oral at bedtime  dextrose 5%. 1000 milliLiter(s) (100 mL/Hr) IV Continuous <Continuous>  dextrose 5%. 1000 milliLiter(s) (50 mL/Hr) IV Continuous <Continuous>  dextrose 50% Injectable 25 Gram(s) IV Push once  dextrose 50% Injectable 12.5 Gram(s) IV Push once  dextrose 50% Injectable 25 Gram(s) IV Push once  glucagon  Injectable 1 milliGRAM(s) IntraMuscular once  heparin   Injectable 5000 Unit(s) SubCutaneous every 12 hours  influenza  Vaccine (HIGH DOSE) 0.5 milliLiter(s) IntraMuscular once  insulin lispro (ADMELOG) corrective regimen sliding scale   SubCutaneous three times a day before meals  insulin lispro (ADMELOG) corrective regimen sliding scale   SubCutaneous at bedtime  pantoprazole    Tablet 40 milliGRAM(s) Oral before breakfast  sodium chloride 0.9%. 1000 milliLiter(s) (100 mL/Hr) IV Continuous <Continuous>  tamsulosin 0.4 milliGRAM(s) Oral at bedtime    MEDICATIONS  (PRN):  albuterol    90 MICROgram(s) HFA Inhaler 2 Puff(s) Inhalation every 6 hours PRN for shortness of breath and/or wheezing  dextrose Oral Gel 15 Gram(s) Oral once PRN Blood Glucose LESS THAN 70 milliGRAM(s)/deciliter      Vital Signs Last 24 Hrs  T(C): 36.2 (29 Jan 2025 09:51), Max: 36.9 (28 Jan 2025 21:47)  T(F): 97.2 (29 Jan 2025 09:51), Max: 98.4 (28 Jan 2025 21:47)  HR: 82 (29 Jan 2025 09:51) (79 - 89)  BP: 147/76 (29 Jan 2025 09:51) (127/68 - 164/75)  BP(mean): 99 (28 Jan 2025 19:41) (99 - 99)  RR: 18 (29 Jan 2025 09:51) (15 - 18)  SpO2: 98% (29 Jan 2025 09:51) (96% - 100%)    Parameters below as of 29 Jan 2025 09:51  Patient On (Oxygen Delivery Method): room air      CAPILLARY BLOOD GLUCOSE      POCT Blood Glucose.: 97 mg/dL (29 Jan 2025 08:52)  POCT Blood Glucose.: 133 mg/dL (28 Jan 2025 22:19)  POCT Blood Glucose.: 117 mg/dL (28 Jan 2025 18:00)    I&O's Summary      PHYSICAL EXAM:  GENERAL: NAD, well-developed  HEAD:  Atraumatic, Normocephalic  EYES: EOMI, PERRLA, conjunctiva and sclera clear  NECK: Supple, No JVD  CHEST/LUNG: Clear to auscultation bilaterally; No wheeze  HEART: Regular rate and rhythm; No murmurs, rubs, or gallops  ABDOMEN: Soft, Nontender, Nondistended; Bowel sounds present  EXTREMITIES:  2+ Peripheral Pulses, No clubbing, cyanosis, or edema  PSYCH: AAOx3  NEUROLOGY: non-focal  SKIN: No rashes or lesions    LABS:                        11.8   6.87  )-----------( 158      ( 29 Jan 2025 05:46 )             36.7     01-29    137  |  104  |  19  ----------------------------<  94  4.4   |  20[L]  |  1.20    Ca    8.9      29 Jan 2025 05:46  Phos  3.8     01-29  Mg     1.90     01-29    TPro  7.0  /  Alb  4.2  /  TBili  0.4  /  DBili  x   /  AST  24  /  ALT  23  /  AlkPhos  63  01-28    PT/INR - ( 28 Jan 2025 18:16 )   PT: 11.8 sec;   INR: 0.99 ratio         PTT - ( 28 Jan 2025 18:16 )  PTT:18.2 sec      Urinalysis Basic - ( 29 Jan 2025 05:46 )    Color: x / Appearance: x / SG: x / pH: x  Gluc: 94 mg/dL / Ketone: x  / Bili: x / Urobili: x   Blood: x / Protein: x / Nitrite: x   Leuk Esterase: x / RBC: x / WBC x   Sq Epi: x / Non Sq Epi: x / Bacteria: x        RADIOLOGY & ADDITIONAL TESTS:    Imaging Personally Reviewed:    Consultant(s) Notes Reviewed:      Care Discussed with Consultants/Other Providers:   Patient is a 84y old  Male who presents with a chief complaint of Suspected cerebrovascular accident, Dizziness (28 Jan 2025 22:04)      SUBJECTIVE / OVERNIGHT EVENTS: patient seen and examined by bedside, pt alert and awake, vertigo now resolved, pt denies headache, dizziness, SOB, CP, Palpitations , N/V/D, abdominal pain, any muscular weakness     MEDICATIONS  (STANDING):  aspirin enteric coated 81 milliGRAM(s) Oral daily  atorvastatin 80 milliGRAM(s) Oral at bedtime  dextrose 5%. 1000 milliLiter(s) (100 mL/Hr) IV Continuous <Continuous>  dextrose 5%. 1000 milliLiter(s) (50 mL/Hr) IV Continuous <Continuous>  dextrose 50% Injectable 25 Gram(s) IV Push once  dextrose 50% Injectable 12.5 Gram(s) IV Push once  dextrose 50% Injectable 25 Gram(s) IV Push once  glucagon  Injectable 1 milliGRAM(s) IntraMuscular once  heparin   Injectable 5000 Unit(s) SubCutaneous every 12 hours  influenza  Vaccine (HIGH DOSE) 0.5 milliLiter(s) IntraMuscular once  insulin lispro (ADMELOG) corrective regimen sliding scale   SubCutaneous three times a day before meals  insulin lispro (ADMELOG) corrective regimen sliding scale   SubCutaneous at bedtime  pantoprazole    Tablet 40 milliGRAM(s) Oral before breakfast  sodium chloride 0.9%. 1000 milliLiter(s) (100 mL/Hr) IV Continuous <Continuous>  tamsulosin 0.4 milliGRAM(s) Oral at bedtime    MEDICATIONS  (PRN):  albuterol    90 MICROgram(s) HFA Inhaler 2 Puff(s) Inhalation every 6 hours PRN for shortness of breath and/or wheezing  dextrose Oral Gel 15 Gram(s) Oral once PRN Blood Glucose LESS THAN 70 milliGRAM(s)/deciliter      Vital Signs Last 24 Hrs  T(C): 36.2 (29 Jan 2025 09:51), Max: 36.9 (28 Jan 2025 21:47)  T(F): 97.2 (29 Jan 2025 09:51), Max: 98.4 (28 Jan 2025 21:47)  HR: 82 (29 Jan 2025 09:51) (79 - 89)  BP: 147/76 (29 Jan 2025 09:51) (127/68 - 164/75)  BP(mean): 99 (28 Jan 2025 19:41) (99 - 99)  RR: 18 (29 Jan 2025 09:51) (15 - 18)  SpO2: 98% (29 Jan 2025 09:51) (96% - 100%)    Parameters below as of 29 Jan 2025 09:51  Patient On (Oxygen Delivery Method): room air      CAPILLARY BLOOD GLUCOSE      POCT Blood Glucose.: 97 mg/dL (29 Jan 2025 08:52)  POCT Blood Glucose.: 133 mg/dL (28 Jan 2025 22:19)  POCT Blood Glucose.: 117 mg/dL (28 Jan 2025 18:00)    I&O's Summary      PHYSICAL EXAM:  GENERAL: NAD, well-developed  HEAD:  Atraumatic, Normocephalic  EYES: EOMI, PERRLA, conjunctiva and sclera clear  NECK: Supple,   CHEST/LUNG: Clear to auscultation bilaterally; No wheeze  HEART: Regular rate and rhythm; No murmurs, rubs, or gallops  ABDOMEN: Soft, Nontender, Nondistended; Bowel sounds present  EXTREMITIES:  2+ Peripheral Pulses, No clubbing, cyanosis, or edema  PSYCH: AAOx3  NEUROLOGY: non-focal  SKIN: No rashes or lesions    LABS:                        11.8   6.87  )-----------( 158      ( 29 Jan 2025 05:46 )             36.7     01-29    137  |  104  |  19  ----------------------------<  94  4.4   |  20[L]  |  1.20    Ca    8.9      29 Jan 2025 05:46  Phos  3.8     01-29  Mg     1.90     01-29    TPro  7.0  /  Alb  4.2  /  TBili  0.4  /  DBili  x   /  AST  24  /  ALT  23  /  AlkPhos  63  01-28    PT/INR - ( 28 Jan 2025 18:16 )   PT: 11.8 sec;   INR: 0.99 ratio         PTT - ( 28 Jan 2025 18:16 )  PTT:18.2 sec      Urinalysis Basic - ( 29 Jan 2025 05:46 )    Color: x / Appearance: x / SG: x / pH: x  Gluc: 94 mg/dL / Ketone: x  / Bili: x / Urobili: x   Blood: x / Protein: x / Nitrite: x   Leuk Esterase: x / RBC: x / WBC x   Sq Epi: x / Non Sq Epi: x / Bacteria: x        RADIOLOGY & ADDITIONAL TESTS:  < from: TTE Limited W or WO Ultrasound Enhancing Agent (01.29.25 @ 10:47) >  _______________________________________________________________________________________     CONCLUSIONS:      1. This is a limited transthoracic echocardiogram performed with a bubble study.   2. Agitated saline injection was negative for intracardiac shunt.   3. Compared to the transthoracic echocardiogram performed on 1/14/2025, this study included a bubble study.    < end of copied text >    Imaging Personally Reviewed:    Consultant(s) Notes Reviewed:  Neurology     Care Discussed with Consultants/Other Providers: Neurology

## 2025-01-29 NOTE — PROGRESS NOTE ADULT - ASSESSMENT
84-year-old male, with past history significant for CVA - without residual deficits, Asthma, Hypertension, Dyslipidemia, and Type-2 DM, presented to the ED secondary acute of dizziness.  Diagnosed with Dizziness in the ED.

## 2025-01-29 NOTE — PROGRESS NOTE ADULT - PROBLEM SELECTOR PLAN 6
Glucose = 123 on CMP.  A1c = 6.1 on 07/15/2024  Per chart review, was on prednisone for recent asthma exacerbation this month  - A1c 6.3   - consistent carb diet  - diet and exercise program

## 2025-01-29 NOTE — DISCHARGE NOTE PROVIDER - NSDCCPCAREPLAN_GEN_ALL_CORE_FT
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 No significant carotid stenosis Multifocal atherosclerotic disease, notably of the right more than L-vertebral arteries and mild-moderately at calcified ICAs.  Incidental 1.7 cm enhancing mass along dura at the R-ventral lower foramen magnum and cervicomedullary junction which is likely a meningioma.. You were evaluated by neurology . Echo cardiogram negative for intracardiac shunt . Neurolohy recommend MRI brain and Cervical spine as outpatient . Please follow up with Neurology Dr Kinsey in 2-4 weeks . Continue medications as directed  - Physical Therapy eval-No skilled PT needs;        SECONDARY DISCHARGE DIAGNOSES  Diagnosis: Severe dizziness  Assessment and Plan of Treatment: Please ensure you are well hydrated. Follow up with neurology as outpatient    Diagnosis: Type 2 diabetes mellitus treated without insulin  Assessment and Plan of Treatment: Continue consistent carbohydrate diet.  Monitor blood glucose levels throughout the day before meals and at bedtime.  Record blood sugars and bring to outpatient providers appointment in order to be reviewed by your doctor for management modifications.  Be aware of diabetes management symptoms including feeling cool and clammy, which may be related to low glucose levels.  Feeling hot and dry may indicate high glucose levels.  If  you feel these symptoms, check your blood sugar.  Make regular podiatry appointments in order to have feet checked for wounds and toe nails cut by a doctor to prevent infections.      Diagnosis: Essential hypertension  Assessment and Plan of Treatment: Continue blood pressure medication regimen as directed. Monitor for any visual changes, headaches or dizziness.  Monitor blood pressure regularly.  Follow up with your PCP for further management for high blood pressure.      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 No significant carotid stenosis Multifocal atherosclerotic disease, notably of the right more than L-vertebral arteries and mild-moderately at calcified ICAs.  Incidental 1.7 cm enhancing mass along dura at the R-ventral lower foramen magnum and cervicomedullary junction which is likely a meningioma.. You were evaluated by neurology . Echo cardiogram negative for intracardiac shunt . Neurolohy recommend MRI brain and Cervical spine as outpatient . Please follow up with Neurology Dr Kinsey in 2-4 weeks . Continue medications as directed  You were evaluated by  Physical Therapy eval-No skilled PT needs;        SECONDARY DISCHARGE DIAGNOSES  Diagnosis: Severe dizziness  Assessment and Plan of Treatment: Please ensure you are well hydrated. Follow up with neurology as outpatient    Diagnosis: Type 2 diabetes mellitus treated without insulin  Assessment and Plan of Treatment: Continue consistent carbohydrate diet.  Monitor blood glucose levels throughout the day before meals and at bedtime.  Record blood sugars and bring to outpatient providers appointment in order to be reviewed by your doctor for management modifications.  Be aware of diabetes management symptoms including feeling cool and clammy, which may be related to low glucose levels.  Feeling hot and dry may indicate high glucose levels.  If  you feel these symptoms, check your blood sugar.  Make regular podiatry appointments in order to have feet checked for wounds and toe nails cut by a doctor to prevent infections.      Diagnosis: Essential hypertension  Assessment and Plan of Treatment: Continue blood pressure medication regimen as directed. Monitor for any visual changes, headaches or dizziness.  Monitor blood pressure regularly.  Follow up with your PCP for further management for high blood pressure.      Diagnosis: At risk for depression  Assessment and Plan of Treatment: Please follow with psychaitry as outpatient    Diagnosis: Hyperkalemia  Assessment and Plan of Treatment: You were noted to have high potassium level, treated with lokelma with improvement. Follow up with your PMD in 2-3 days for repeat labs    Diagnosis: Vitamin D deficiency  Assessment and Plan of Treatment: You are noted to have low vitamin D L level, please take vitamin D supplement as directed

## 2025-01-29 NOTE — PHYSICAL THERAPY INITIAL EVALUATION ADULT - ADDITIONAL COMMENTS
Patient was recently discharged from [facility]. Patient lives alone in an apartment, +elevator. Patient has cane and rolling walker but does not use either. Denies falls, independent with ADLs, and children are involved in care.    Patient left semi-supine in NAD, +bed alarm, +lines/tubes intact, +call bell. RN made aware of session details. Patient was recently discharged from [facility]. Patient lives alone in an apartment, +elevator. Patient has cane and rolling walker but does not use either. Denies falls, independent with ADLs, and children are involved in care.    Patient left sitting at the edge of bed in NAD, +lines/tubes intact, +call bell.

## 2025-01-29 NOTE — DISCHARGE NOTE PROVIDER - HOSPITAL COURSE
84-year-old male, with past history significant for CVA - without residual deficits, Asthma, Hypertension, Dyslipidemia, and Type-2 DM, presented to the ED secondary acute of dizziness.  Diagnosed with Dizziness in the ED.         Suspected cerebrovascular accident (CVA).   Presented with report of acute onset of dizziness while standing, after doing an hour long breathing test  CTs = "...Severe stenosis of the bilateral vertebral arteries at origins, w/ ascending collateral arteries off thyrocervical trunks....No significant carotid stenosis...Multifocal atherosclerotic disease, notably of the right more than L-vertebral arteries and mild-moderately at calcified ICAs.  Incidental 1.7 cm enhancing mass along dura at the R-ventral lower foramen magnum and cervicomedullary junction which is likely a meningioma."  ECG = Sinus rhythm w/ 1st degree AVB at 85 bpm, QTc = 435  History of CVA w/o residual deficits  No gross signs of infection  S/P Stroke Code in the ED and is being followed by Neurology Team (appreciated)  NIHSS = 0  - neurological checks Q4H, vital signs Q4H, permissive hypertension  - MRI of the brain w/o contrast- can be done as outpatient per neurology   - atorvastatin increased from PTA 40 mg to 80 mg.  Aspirin continued as PTA  - Neurology rec to add Plavix 75mg daily for 3 weeks per chance, duration to be further determined pending MRI   - TTE w/ Bubble Study negative for intracardiac shunt   - Physical Therapy eval-No skilled PT needs;  - Social Work eval  - patient passed bedside dysphagia screen.    Severe dizziness.    Acute onset while standing after completing an hour long "breathing test"  Room spinning sensation and worsened over time  History of CVA w/o residual deficits  CT findings as above (see reports)  Could have been impacted by dehydration; now resolved   No gross signs of infection  ECG as above; no sign of A-fib  - f/u MRI of the brain - can be done as outpatient   -  TTE with Bubble study negative for intracardiac shunt   - Neurology Team following (appreciated)  - , vitamin D levels low, will supplement   - fall precautions  - Physical Therapy eval- no skilled PT needs.    Dehydration.   ·: Dry lips, dry oral mucosa, hemoconcentrated lab-work  Report of decreased oral intake today  - prescribing IVF of  mL bolus, followed by LR at 100 mL/Hr x 5 hours  - encourage oral hydration, as tolerated  - ensure optimal hydration  - f/u electrolytes.     At risk for depression.   Report death of wife over one year ago, after 60  years of marriage, and with depression due to same  Lives alone  outpt psych eval.     Hyperkalemia.   ·  Plan: Potassium = 5.7 overnight  Given Lokelma  -improved to 4.4.  Type 2 diabetes mellitus treated without insulin.   ·  Plan: Glucose = 123 on CMP.  A1c = 6.1 on 07/15/2024  Per chart review, was on prednisone for recent asthma exacerbation this month  - A1c 6.3   - consistent carb diet  - diet and exercise program.   Essential hypertension.    No acute issues presently  Permissive hypertension, as per Neurology Team  - f/u for restart of PTA antihypertensive med.    Per neurology , pt is ok for DC home and can follow up with neurology Dr Moscoso as outpt , case d/w neurology   Patient hemodynamically stable for discharge home  Time spent in discharge process is 35 min.

## 2025-01-29 NOTE — PHYSICAL THERAPY INITIAL EVALUATION ADULT - PERTINENT HX OF CURRENT PROBLEM, REHAB EVAL
84-year-old male, with past history significant for CVA - without residual deficits, Asthma, Hypertension, Dyslipidemia, and Type-2 DM, presented to the ED secondary acute of dizziness.  Diagnosed with Dizziness in the ED.    CT Brain: No acute intracranial hemorrhage, mass effect, or midline shift. Chronic infarcts seen in the bilateral cerebellar hemispheres, not definitely changed from MRI the brain from 2004.     MRI pending, likely outpatient.

## 2025-01-29 NOTE — PROGRESS NOTE ADULT - PROBLEM SELECTOR PLAN 4
Report death of wife over one year ago, after 60  years of marriage, and with depression due to same  Lives alone  outpt psych eval

## 2025-01-29 NOTE — DISCHARGE NOTE PROVIDER - NSDCMRMEDTOKEN_GEN_ALL_CORE_FT
albuterol 90 mcg/inh inhalation aerosol: 2 puff(s) inhaled every 6 hours as needed for  shortness of breath and/or wheezing (last dispensed 12/20/24)  amLODIPine 5 mg oral tablet: 1 tab(s) orally once a day (90-day supply last dispensed 11/23/24)  atorvastatin 40 mg oral tablet: 1 tab(s) orally once a day (90-day supply last dispensed 9/23/24)  Myrbetriq 50 mg oral tablet, extended release: 1 tab(s) orally once a day (90-day supply last dispensed 12/20/24)  pantoprazole 40 mg oral delayed release tablet: 1 tab(s) orally once a day (before a meal)  tamsulosin 0.4 mg oral capsule: 1 cap(s) orally once a day (90-day supply last dispensed 9/19/24)   albuterol 90 mcg/inh inhalation aerosol: 2 puff(s) inhaled every 6 hours as needed for  shortness of breath and/or wheezing (last dispensed 12/20/24)  amLODIPine 5 mg oral tablet: 1 tab(s) orally once a day (90-day supply last dispensed 11/23/24)  aspirin 81 mg oral delayed release tablet: 1 tab(s) orally once a day  atorvastatin 40 mg oral tablet: 1 tab(s) orally once a day (90-day supply last dispensed 9/23/24)  cholecalciferol 25 mcg (1000 intl units) oral capsule: 2 cap(s) orally once a day  clopidogrel 75 mg oral tablet: 1 tab(s) orally once a day take through 2/19/2025  Myrbetriq 50 mg oral tablet, extended release: 1 tab(s) orally once a day (90-day supply last dispensed 12/20/24)  pantoprazole 40 mg oral delayed release tablet: 1 tab(s) orally once a day (before a meal)  tamsulosin 0.4 mg oral capsule: 1 cap(s) orally once a day (90-day supply last dispensed 9/19/24)

## 2025-01-29 NOTE — PHYSICAL THERAPY INITIAL EVALUATION ADULT - NSPTDISCHREC_GEN_A_CORE
To remain on PT program when at [facility] to promote safe mobility and prevent deconditioning./No skilled PT needs

## 2025-01-29 NOTE — PROGRESS NOTE ADULT - PROBLEM SELECTOR PLAN 1
Presented with report of acute onset of dizziness while standing, after doing an hour long breathing test  CTs = "...Severe stenosis of the bilateral vertebral arteries at origins, w/ ascending collateral arteries off thyrocervical trunks....No significant carotid stenosis...Multifocal atherosclerotic disease, notably of the right more than L-vertebral arteries and mild-moderately at calcified ICAs.  Incidental 1.7 cm enhancing mass along dura at the R-ventral lower foramen magnum and cervicomedullary junction which is likely a meningioma."  ECG = Sinus rhythm w/ 1st degree AVB at 85 bpm, QTc = 435  History of CVA w/o residual deficits  No gross signs of infection  S/P Stroke Code in the ED and is being followed by Neurology Team (appreciated)  NIHSS = 0  - neurological checks Q4H, vital signs Q4H, permissive hypertension  - MRI of the brain w/o contrast- can be done as outpatient per neurology   - atorvastatin increased from PTA 40 mg to 80 mg.  Aspirin continued as PTA  - Neurology rec to add Plavix 75mg daily for 3 weeks per chance, duration to be further determined pending MRI   - TTE w/ Bubble Study negative for intracardiac shunt   - Physical Therapy eval-No skilled PT needs;  - Social Work eval  - patient passed bedside dysphagia screen

## 2025-01-29 NOTE — DISCHARGE NOTE NURSING/CASE MANAGEMENT/SOCIAL WORK - FINANCIAL ASSISTANCE
[facility] provides services at a reduced cost to those who are determined to be eligible through [facility]’s financial assistance program. Information regarding [facility]’s financial assistance program can be found by going to [email or web address] or by calling [phone].

## 2025-01-29 NOTE — DISCHARGE NOTE PROVIDER - NSDCCPTREATMENT_GEN_ALL_CORE_FT
PRINCIPAL PROCEDURE  Procedure: CT scan  Findings and Treatment:   < end of copied text >  FINDINGS:  VENTRICLES AND SULCI: Age appropriate involutional changes.  INTRA-AXIAL: No mass effect, acute hemorrhage, or midline shift.  There   are periventricular and subcortical white matter hypodensities,   consistent with microvascular type changes. Chronic infarcts seen in the   bilateral cerebellar hemispheres, not definitely changed from MRI the   brain from 2004.  EXTRA-AXIAL: No mass or fluid collection. Basal cisterns are normal in   appearance.  VISUALIZED SINUSES:  Scattered mucosal thickening.  TYMPANOMASTOID CAVITIES:  Clear.  VISUALIZED ORBITS: Bilateral lens replacement.  CALVARIUM: Intact.  MISCELLANEOUS: None.  IMPRESSION:  No acute intracranial hemorrhage, mass effect, or midline shift.  Chronic infarcts seen in the bilateral cerebellar hemispheres, not   definitely changed from MRI the brain from 2004.  Findings discussed with JUD Terry by Dr. Herrera on 1/28/2025 6:21 PM   with readback confirmation.< from: CT Brain Stroke Protocol (01.28.25 @ 18:45) >        SECONDARY PROCEDURE  Procedure: 2D echo  Findings and Treatment:   < end of copied text >   1. This is a limited transthoracic echocardiogram performed with a bubble study.   2. Agitated saline injection was negative for intracardiac shunt.   3. Compared to the transthoracic echocardiogram performed on 1/14/2025, this study included a bubble study.  ________________________________________________________________________________________< from: TTE Limited W or WO Ultrasound Enhancing Agent (01.29.25 @ 10:47) >

## 2025-02-02 PROBLEM — Z87.898 HISTORY OF UNSTEADY GAIT: Status: RESOLVED | Noted: 2025-02-02 | Resolved: 2025-02-02

## 2025-02-02 PROBLEM — E78.5 HLD (HYPERLIPIDEMIA): Status: ACTIVE | Noted: 2025-01-29

## 2025-02-02 PROBLEM — Z86.39 HISTORY OF HYPERLIPIDEMIA: Status: RESOLVED | Noted: 2025-02-02 | Resolved: 2025-02-02

## 2025-02-02 PROBLEM — J45.909 ASTHMA, UNSPECIFIED ASTHMA SEVERITY, UNSPECIFIED WHETHER COMPLICATED, UNSPECIFIED WHETHER PERSISTENT: Status: ACTIVE | Noted: 2018-01-26

## 2025-02-02 PROBLEM — H91.90 HEARING LOSS: Status: ACTIVE | Noted: 2025-01-28

## 2025-02-02 PROBLEM — H52.4 PRESBYOPIA: Status: RESOLVED | Noted: 2025-02-02 | Resolved: 2025-02-02

## 2025-02-02 PROBLEM — E55.9 VITAMIN D DEFICIENCY: Status: ACTIVE | Noted: 2025-01-28

## 2025-02-02 PROBLEM — Z71.89 ADVANCE CARE PLANNING: Status: ACTIVE | Noted: 2025-02-02

## 2025-02-02 PROBLEM — H90.5 SNHL (SENSORINEURAL HEARING LOSS): Status: RESOLVED | Noted: 2025-02-02 | Resolved: 2025-02-02

## 2025-02-02 PROBLEM — D64.9 ANEMIA: Status: ACTIVE | Noted: 2025-01-28

## 2025-02-02 PROBLEM — R73.9 HYPERGLYCEMIA: Status: ACTIVE | Noted: 2025-01-28

## 2025-02-02 PROBLEM — Z23 ENCOUNTER FOR IMMUNIZATION: Status: ACTIVE | Noted: 2025-01-28 | Resolved: 2025-02-11

## 2025-02-02 PROBLEM — G45.9 TRANSIENT ISCHEMIC ATTACK: Status: ACTIVE | Noted: 2025-01-28

## 2025-02-02 PROBLEM — R41.89 IMPAIRED COGNITION: Status: ACTIVE | Noted: 2025-02-02

## 2025-02-02 PROBLEM — I10 HTN (HYPERTENSION), BENIGN: Status: ACTIVE | Noted: 2025-01-28

## 2025-02-10 ENCOUNTER — APPOINTMENT (OUTPATIENT)
Dept: PULMONOLOGY | Facility: CLINIC | Age: 85
End: 2025-02-10
Payer: MEDICARE

## 2025-02-10 VITALS
WEIGHT: 194 LBS | BODY MASS INDEX: 26.28 KG/M2 | DIASTOLIC BLOOD PRESSURE: 71 MMHG | OXYGEN SATURATION: 93 % | SYSTOLIC BLOOD PRESSURE: 160 MMHG | RESPIRATION RATE: 17 BRPM | HEIGHT: 72 IN | HEART RATE: 74 BPM

## 2025-02-10 DIAGNOSIS — J45.909 UNSPECIFIED ASTHMA, UNCOMPLICATED: ICD-10-CM

## 2025-02-10 PROCEDURE — 99214 OFFICE O/P EST MOD 30 MIN: CPT

## 2025-02-21 ENCOUNTER — APPOINTMENT (OUTPATIENT)
Dept: PULMONOLOGY | Facility: CLINIC | Age: 85
End: 2025-02-21
Payer: MEDICARE

## 2025-02-21 VITALS
DIASTOLIC BLOOD PRESSURE: 58 MMHG | HEIGHT: 72 IN | TEMPERATURE: 97.1 F | RESPIRATION RATE: 16 BRPM | SYSTOLIC BLOOD PRESSURE: 106 MMHG | OXYGEN SATURATION: 95 % | HEART RATE: 80 BPM

## 2025-02-21 PROCEDURE — 99213 OFFICE O/P EST LOW 20 MIN: CPT

## 2025-02-21 RX ORDER — FLUTICASONE FUROATE AND VILANTEROL TRIFENATATE 100; 25 UG/1; UG/1
100-25 POWDER RESPIRATORY (INHALATION)
Qty: 60 | Refills: 11 | Status: ACTIVE | COMMUNITY
Start: 2025-02-21 | End: 1900-01-01

## 2025-02-24 ENCOUNTER — APPOINTMENT (OUTPATIENT)
Dept: GERIATRICS | Facility: CLINIC | Age: 85
End: 2025-02-24

## 2025-02-24 ENCOUNTER — EMERGENCY (EMERGENCY)
Facility: HOSPITAL | Age: 85
LOS: 1 days | Discharge: HOME | End: 2025-02-24
Attending: EMERGENCY MEDICINE | Admitting: EMERGENCY MEDICINE
Payer: MEDICARE

## 2025-02-24 VITALS
HEART RATE: 89 BPM | OXYGEN SATURATION: 99 % | DIASTOLIC BLOOD PRESSURE: 73 MMHG | TEMPERATURE: 98 F | WEIGHT: 195.11 LBS | SYSTOLIC BLOOD PRESSURE: 157 MMHG | HEIGHT: 72 IN | RESPIRATION RATE: 18 BRPM

## 2025-02-24 VITALS
TEMPERATURE: 98 F | SYSTOLIC BLOOD PRESSURE: 140 MMHG | RESPIRATION RATE: 18 BRPM | OXYGEN SATURATION: 99 % | DIASTOLIC BLOOD PRESSURE: 82 MMHG | HEART RATE: 97 BPM

## 2025-02-24 LAB
ALBUMIN SERPL ELPH-MCNC: 4 G/DL — SIGNIFICANT CHANGE UP (ref 3.3–5)
ALP SERPL-CCNC: 65 U/L — SIGNIFICANT CHANGE UP (ref 40–120)
ALT FLD-CCNC: 50 U/L — HIGH (ref 4–41)
ANION GAP SERPL CALC-SCNC: 16 MMOL/L — HIGH (ref 7–14)
AST SERPL-CCNC: 66 U/L — HIGH (ref 4–40)
BASOPHILS # BLD AUTO: 0.04 K/UL — SIGNIFICANT CHANGE UP (ref 0–0.2)
BASOPHILS NFR BLD AUTO: 0.5 % — SIGNIFICANT CHANGE UP (ref 0–2)
BILIRUB SERPL-MCNC: 0.6 MG/DL — SIGNIFICANT CHANGE UP (ref 0.2–1.2)
BUN SERPL-MCNC: 41 MG/DL — HIGH (ref 7–23)
CALCIUM SERPL-MCNC: 8.8 MG/DL — SIGNIFICANT CHANGE UP (ref 8.4–10.5)
CHLORIDE SERPL-SCNC: 98 MMOL/L — SIGNIFICANT CHANGE UP (ref 98–107)
CO2 SERPL-SCNC: 19 MMOL/L — LOW (ref 22–31)
CREAT SERPL-MCNC: 1.95 MG/DL — HIGH (ref 0.5–1.3)
EGFR: 33 ML/MIN/1.73M2 — LOW
EOSINOPHIL # BLD AUTO: 0 K/UL — SIGNIFICANT CHANGE UP (ref 0–0.5)
EOSINOPHIL NFR BLD AUTO: 0 % — SIGNIFICANT CHANGE UP (ref 0–6)
FLUAV AG NPH QL: SIGNIFICANT CHANGE UP
FLUBV AG NPH QL: SIGNIFICANT CHANGE UP
GLUCOSE SERPL-MCNC: 132 MG/DL — HIGH (ref 70–99)
HCT VFR BLD CALC: 38.2 % — LOW (ref 39–50)
HGB BLD-MCNC: 12.4 G/DL — LOW (ref 13–17)
IANC: 5.75 K/UL — SIGNIFICANT CHANGE UP (ref 1.8–7.4)
IMM GRANULOCYTES NFR BLD AUTO: 0.5 % — SIGNIFICANT CHANGE UP (ref 0–0.9)
LYMPHOCYTES # BLD AUTO: 0.7 K/UL — LOW (ref 1–3.3)
LYMPHOCYTES # BLD AUTO: 9.6 % — LOW (ref 13–44)
MCHC RBC-ENTMCNC: 28.6 PG — SIGNIFICANT CHANGE UP (ref 27–34)
MCHC RBC-ENTMCNC: 32.5 G/DL — SIGNIFICANT CHANGE UP (ref 32–36)
MCV RBC AUTO: 88 FL — SIGNIFICANT CHANGE UP (ref 80–100)
MONOCYTES # BLD AUTO: 0.79 K/UL — SIGNIFICANT CHANGE UP (ref 0–0.9)
MONOCYTES NFR BLD AUTO: 10.8 % — SIGNIFICANT CHANGE UP (ref 2–14)
NEUTROPHILS # BLD AUTO: 5.75 K/UL — SIGNIFICANT CHANGE UP (ref 1.8–7.4)
NEUTROPHILS NFR BLD AUTO: 78.6 % — HIGH (ref 43–77)
NRBC # BLD AUTO: 0 K/UL — SIGNIFICANT CHANGE UP (ref 0–0)
NRBC # FLD: 0 K/UL — SIGNIFICANT CHANGE UP (ref 0–0)
NRBC BLD AUTO-RTO: 0 /100 WBCS — SIGNIFICANT CHANGE UP (ref 0–0)
PLATELET # BLD AUTO: 145 K/UL — LOW (ref 150–400)
POTASSIUM SERPL-MCNC: 4.5 MMOL/L — SIGNIFICANT CHANGE UP (ref 3.5–5.3)
POTASSIUM SERPL-SCNC: 4.5 MMOL/L — SIGNIFICANT CHANGE UP (ref 3.5–5.3)
PROT SERPL-MCNC: 6.9 G/DL — SIGNIFICANT CHANGE UP (ref 6–8.3)
RBC # BLD: 4.34 M/UL — SIGNIFICANT CHANGE UP (ref 4.2–5.8)
RBC # FLD: 12.8 % — SIGNIFICANT CHANGE UP (ref 10.3–14.5)
RSV RNA NPH QL NAA+NON-PROBE: SIGNIFICANT CHANGE UP
SARS-COV-2 RNA SPEC QL NAA+PROBE: SIGNIFICANT CHANGE UP
SODIUM SERPL-SCNC: 133 MMOL/L — LOW (ref 135–145)
WBC # BLD: 7.32 K/UL — SIGNIFICANT CHANGE UP (ref 3.8–10.5)
WBC # FLD AUTO: 7.32 K/UL — SIGNIFICANT CHANGE UP (ref 3.8–10.5)

## 2025-02-24 PROCEDURE — 71046 X-RAY EXAM CHEST 2 VIEWS: CPT | Mod: 26

## 2025-02-24 PROCEDURE — 99285 EMERGENCY DEPT VISIT HI MDM: CPT

## 2025-02-24 RX ORDER — METHYLPREDNISOLONE ACETATE 40 MG/ML
40 VIAL (ML) INJECTION ONCE
Refills: 0 | Status: COMPLETED | OUTPATIENT
Start: 2025-02-24 | End: 2025-02-24

## 2025-02-24 RX ORDER — PREDNISONE 5 MG/1
1 TABLET ORAL
Qty: 4 | Refills: 0
Start: 2025-02-24 | End: 2025-02-27

## 2025-02-24 RX ORDER — IPRATROPIUM BROMIDE AND ALBUTEROL SULFATE .5; 2.5 MG/3ML; MG/3ML
3 SOLUTION RESPIRATORY (INHALATION)
Refills: 0 | Status: COMPLETED | OUTPATIENT
Start: 2025-02-24 | End: 2025-02-24

## 2025-02-24 RX ORDER — BACTERIOSTATIC SODIUM CHLORIDE 0.9 %
1000 VIAL (ML) INJECTION ONCE
Refills: 0 | Status: COMPLETED | OUTPATIENT
Start: 2025-02-24 | End: 2025-02-24

## 2025-02-24 RX ORDER — ALBUTEROL 90 MCG
2.5 AEROSOL REFILL (GRAM) INHALATION ONCE
Refills: 0 | Status: COMPLETED | OUTPATIENT
Start: 2025-02-24 | End: 2025-02-24

## 2025-02-24 RX ADMIN — Medication 2.5 MILLIGRAM(S): at 13:18

## 2025-02-24 RX ADMIN — IPRATROPIUM BROMIDE AND ALBUTEROL SULFATE 3 MILLILITER(S): .5; 2.5 SOLUTION RESPIRATORY (INHALATION) at 11:07

## 2025-02-24 RX ADMIN — IPRATROPIUM BROMIDE AND ALBUTEROL SULFATE 3 MILLILITER(S): .5; 2.5 SOLUTION RESPIRATORY (INHALATION) at 11:34

## 2025-02-24 RX ADMIN — Medication 1000 MILLILITER(S): at 14:25

## 2025-02-24 RX ADMIN — IPRATROPIUM BROMIDE AND ALBUTEROL SULFATE 3 MILLILITER(S): .5; 2.5 SOLUTION RESPIRATORY (INHALATION) at 11:18

## 2025-02-24 RX ADMIN — Medication 40 MILLIGRAM(S): at 11:18

## 2025-02-24 NOTE — ED PROVIDER NOTE - NSFOLLOWUPINSTRUCTIONS_ED_ALL_ED_FT
See your primary care doctor in the next 1-2 days to discuss your visit to the ED and your reduced kidney function. Return to the ED for worsening breathing. Use steroids as prescribed and your albuterol as prescribed.     Asthma in Adults: Care Instructions  Skip Navigation  Overview  Lungs in chest showing bronchial tubes, with detail of healthy airway and airway narrowed by asthma.  Asthma makes it hard for you to breathe. During an asthma attack, the airways swell and narrow. Severe asthma attacks can be dangerous, but you can usually prevent them. Controlling asthma and treating symptoms before they get bad can help you avoid bad attacks. You may also avoid future trips to the doctor.    Follow-up care is a key part of your treatment and safety. Be sure to make and go to all appointments, and call your doctor if you are having problems. It's also a good idea to know your test results and keep a list of the medicines you take.    How can you care for yourself at home?  Follow your asthma action plan so you can manage your symptoms at home. An asthma action plan will help you prevent and control airway reactions and will tell you what to do during an asthma attack. If you do not have an asthma action plan, work with your doctor to build one.  Take your asthma medicine exactly as prescribed. Medicine plays an important role in controlling asthma. Talk to your doctor right away if you have any questions about what to take and how to take it.  Take your controller medicine. If you have symptoms often, you will likely need to take it every day. Controller medicine usually includes an inhaled corticosteroid. The goal is to prevent problems before they occur.  Use your quick-relief medicine when you have symptoms of an asthma attack. Some people need to use quick-relief medicine before they exercise to prevent asthma symptoms. Albuterol is a quick-relief medicine that is often used. In some cases, a certain type of controller inhaler is used as a quick-relief medicine. Ask your doctor what to use for quick relief.  If your doctor prescribed corticosteroid pills to use during an attack, take them as directed. They may take hours to work, but they may shorten the attack and help you breathe better.  Keep your quick-relief medicine with you at all times.  Talk to your doctor before using other medicines. Some medicines, such as aspirin, can cause asthma attacks in some people.  Check yourself for asthma symptoms to know which step to follow in your action plan. Watch for things like being short of breath, having chest tightness, coughing, and wheezing. Also notice if symptoms wake you up at night or if you get tired quickly when you exercise.  If you have a peak flow meter, use it to check how well you are breathing. This can help you predict when an asthma attack is going to occur. Then you can take medicine to prevent the asthma attack or make it less severe.  See your doctor regularly. These visits will help you learn more about asthma and what you can do to control it. Your doctor will monitor your treatment to make sure the medicine is helping you.  Keep track of your asthma attacks and your treatment. After you have had an attack, write down what triggered it, what helped end it, and any concerns you have about your asthma action plan. Take your diary when you see your doctor. You can then review your asthma action plan and decide if it is working.  Don't smoke, vape, or use other tobacco or nicotine products. If you need help quitting, talk to your doctor about quit programs and medicines. Try to avoid being around smoke or the aerosol mist from vaping.  Learn what triggers an asthma attack for you, and avoid the triggers when you can. Common triggers include colds, smoke, air pollution, dust, pollen, mold, pets with fur, cockroaches, stress, and cold, dry air.  Avoid infections such as COVID-19, colds, and the flu. Wash your hands often. Talk to your doctor about getting pneumococcal and respiratory syncytial virus (RSV) vaccines. Get a flu vaccine every fall. Stay up to date on your COVID-19 vaccines.  Call 911 anytime you think you may need emergency care, and keep using your asthma action plan until help arrives. For example, call if:    You have severe trouble breathing.  Call your doctor now or seek immediate medical care if:    Your symptoms do not get better after you have followed your asthma action plan.  You cough up yellow, dark brown, or bloody mucus (sputum).  Watch closely for changes in your health, and be sure to contact your doctor if:    Your coughing and wheezing get worse.  You need to use quick-relief medicine on more than 2 days a week (unless it is just for exercise).  You need help figuring out what is triggering your asthma attacks.

## 2025-02-24 NOTE — ED PROVIDER NOTE - PHYSICAL EXAMINATION
Physical Exam:  General: NAD, Conversive  Eyes: EOMI, Conjunctiva and sclera clear  Neck: No JVD  Lungs: No respiratory distress, +scattered expiratory wheezing. No crackles  Heart: Normal S1, S2, no murmurs  Abdomen: Soft, nontender, nondistended, no CVA tenderness  Extremities: 2+ peripheral pulses, no edema  Psych: AAO X3  Neurologic: Non-focal

## 2025-02-24 NOTE — ED ADULT NURSE NOTE - NSSUHOSCREENINGYN_ED_ALL_ED
No - the patient is unable to be screened due to medical condition fair, will monitor progress closely

## 2025-02-24 NOTE — ED PROVIDER NOTE - OBJECTIVE STATEMENT
-This is an 84-year-old male with history of asthma, hypertension, diabetes, CVA presenting for shortness of breath.  Patient states over the last 2 to 3 days he has had cough, fever which was only present on the first day and resolved with Tylenol, and worsening shortness of breath.  This morning shortness of breath worsens he went to the pharmacy to  his albuterol and then when he took it his symptoms improved but he still had family called EMS to be checked in the hospital.  They also give him albuterol and states he feels improved after it.  He denies chest pain, leg swelling, abdominal pain, vomiting, other symptoms.  Symptoms feel similar to prior episodes of asthma exacerbation.  No known sick contacts.

## 2025-02-24 NOTE — ED PROVIDER NOTE - TOBACCO USE
EPWORTH SLEEPINESS SCALE - PATIENT RESPONSES    Situation             Response  Sitting and reading 0   Watching TV 0   Sitting inactive in a public place (e.g. A theatre or a meeting) 0   As a passenger in a car for an hour without a break 0   Lying down to rest in the afternoon when circumstances permit 1   Sitting and talking to someone 0   Sitting quietly after lunch without alcohol 0   In a car while stopped for a few minutes in traffic 0   Total score 1        Never smoker

## 2025-02-24 NOTE — ED PROVIDER NOTE - PROGRESS NOTE DETAILS
Menza: Amb sat 95% Attending MD Bird.  Pt signed out to me in stable condition pending Ambulatory sat, NS bolus, confirm goal, dc with return precautions, 83 yo male SOB with cough, alb improved minimally yesterday, reduced SAT on arrival, 91% while asleep, dry, dec PO this week, rep fluid, ambulate. Sang: Amb sat 95%. Feeling much better. Has albuterol at home. Will discharge with return precautions, steroids and close outpatient follow up.

## 2025-02-24 NOTE — ED PROVIDER NOTE - CLINICAL SUMMARY MEDICAL DECISION MAKING FREE TEXT BOX
This is a 84-year-old male with history as above presenting for shortness of breath.  Vitals unremarkable, exam with scattered expiratory wheezing.  Given exam, improvement with albuterol, history, highest suspicion for asthma exacerbation in setting of viral illness.  Will obtain CT to ensure there is no evidence of pneumonia, labs to ensure no severe metabolic derangements, anemia or leukocytosis.  Will treat symptomatically as well and observe for improvement.  No chest pain to suggest cardiac cause, EKG unremarkable

## 2025-02-24 NOTE — ED ADULT NURSE NOTE - NSFALLUNIVINTERV_ED_ALL_ED
Bed/Stretcher in lowest position, wheels locked, appropriate side rails in place/Call bell, personal items and telephone in reach/Instruct patient to call for assistance before getting out of bed/chair/stretcher/Non-slip footwear applied when patient is off stretcher/[place] to call system/Physically safe environment - no spills, clutter or unnecessary equipment/Purposeful proactive rounding/Room/bathroom lighting operational, light cord in reach

## 2025-02-24 NOTE — ED PROVIDER NOTE - PATIENT PORTAL LINK FT
You can access the FollowMyHealth Patient Portal offered by [facility] by registering at the following website: [email or web address]. By joining [email or web address]’s FollowMyHealth portal, you will also be able to view your health information using other applications (apps) compatible with our system.

## 2025-02-24 NOTE — ED ADULT TRIAGE NOTE - CHIEF COMPLAINT QUOTE
pt c/o of cough, cold chest congestion for 2 days, pt denies any chest pain 1 neb treatment given by EMS, no respiratory distress noted

## 2025-02-24 NOTE — PROVIDER CONTACT NOTE (OTHER) - ASSESSMENT
Pt asked for Medicaid taxi. Arranged [place] [employer] [phone] through [facility] Inv# [id number]. P/U 7;30. Pt will be called. Provided taxi info to pt.

## 2025-04-03 ENCOUNTER — RX RENEWAL (OUTPATIENT)
Age: 85
End: 2025-04-03

## 2025-04-07 ENCOUNTER — APPOINTMENT (OUTPATIENT)
Dept: PULMONOLOGY | Facility: CLINIC | Age: 85
End: 2025-04-07
Payer: MEDICARE

## 2025-04-07 VITALS
WEIGHT: 197 LBS | DIASTOLIC BLOOD PRESSURE: 70 MMHG | RESPIRATION RATE: 16 BRPM | HEART RATE: 65 BPM | HEIGHT: 72 IN | SYSTOLIC BLOOD PRESSURE: 165 MMHG | OXYGEN SATURATION: 93 % | TEMPERATURE: 97.2 F | BODY MASS INDEX: 26.68 KG/M2

## 2025-04-07 PROCEDURE — G2211 COMPLEX E/M VISIT ADD ON: CPT

## 2025-04-07 PROCEDURE — 99214 OFFICE O/P EST MOD 30 MIN: CPT

## 2025-05-13 ENCOUNTER — APPOINTMENT (OUTPATIENT)
Dept: GERIATRICS | Facility: CLINIC | Age: 85
End: 2025-05-13

## 2025-05-19 ENCOUNTER — APPOINTMENT (OUTPATIENT)
Dept: GERIATRICS | Facility: CLINIC | Age: 85
End: 2025-05-19
Payer: MEDICARE

## 2025-05-19 VITALS
OXYGEN SATURATION: 93 % | HEIGHT: 72 IN | WEIGHT: 198 LBS | DIASTOLIC BLOOD PRESSURE: 64 MMHG | TEMPERATURE: 98.5 F | BODY MASS INDEX: 26.82 KG/M2 | HEART RATE: 62 BPM | SYSTOLIC BLOOD PRESSURE: 169 MMHG | RESPIRATION RATE: 16 BRPM

## 2025-05-19 VITALS — DIASTOLIC BLOOD PRESSURE: 80 MMHG | SYSTOLIC BLOOD PRESSURE: 160 MMHG

## 2025-05-19 DIAGNOSIS — R41.89 OTHER SYMPTOMS AND SIGNS INVOLVING COGNITIVE FUNCTIONS AND AWARENESS: ICD-10-CM

## 2025-05-19 DIAGNOSIS — E78.5 HYPERLIPIDEMIA, UNSPECIFIED: ICD-10-CM

## 2025-05-19 DIAGNOSIS — L20.9 ATOPIC DERMATITIS, UNSPECIFIED: ICD-10-CM

## 2025-05-19 DIAGNOSIS — N40.1 BENIGN PROSTATIC HYPERPLASIA WITH LOWER URINARY TRACT SYMPMS: ICD-10-CM

## 2025-05-19 DIAGNOSIS — M25.561 PAIN IN RIGHT KNEE: ICD-10-CM

## 2025-05-19 DIAGNOSIS — I10 ESSENTIAL (PRIMARY) HYPERTENSION: ICD-10-CM

## 2025-05-19 DIAGNOSIS — J45.909 UNSPECIFIED ASTHMA, UNCOMPLICATED: ICD-10-CM

## 2025-05-19 LAB
25(OH)D3 SERPL-MCNC: 40.9 NG/ML
ALBUMIN SERPL ELPH-MCNC: 4.5 G/DL
ALP BLD-CCNC: 69 U/L
ALT SERPL-CCNC: 23 U/L
ANION GAP SERPL CALC-SCNC: 16 MMOL/L
AST SERPL-CCNC: 26 U/L
BASOPHILS # BLD AUTO: 0.07 K/UL
BASOPHILS NFR BLD AUTO: 0.9 %
BILIRUB SERPL-MCNC: 0.3 MG/DL
BUN SERPL-MCNC: 29 MG/DL
CALCIUM SERPL-MCNC: 9.4 MG/DL
CHLORIDE SERPL-SCNC: 105 MMOL/L
CHOLEST SERPL-MCNC: 205 MG/DL
CO2 SERPL-SCNC: 18 MMOL/L
CREAT SERPL-MCNC: 1.61 MG/DL
EGFRCR SERPLBLD CKD-EPI 2021: 42 ML/MIN/1.73M2
EOSINOPHIL # BLD AUTO: 0.5 K/UL
EOSINOPHIL NFR BLD AUTO: 6.7 %
ESTIMATED AVERAGE GLUCOSE: 126 MG/DL
FOLATE SERPL-MCNC: 15.2 NG/ML
GLUCOSE SERPL-MCNC: 99 MG/DL
HBA1C MFR BLD HPLC: 6 %
HCT VFR BLD CALC: 39.6 %
HDLC SERPL-MCNC: 56 MG/DL
HGB BLD-MCNC: 12.3 G/DL
IMM GRANULOCYTES NFR BLD AUTO: 0.3 %
LDLC SERPL-MCNC: 136 MG/DL
LYMPHOCYTES # BLD AUTO: 1.88 K/UL
LYMPHOCYTES NFR BLD AUTO: 25 %
MAN DIFF?: NORMAL
MCHC RBC-ENTMCNC: 28.3 PG
MCHC RBC-ENTMCNC: 31.1 G/DL
MCV RBC AUTO: 91 FL
MONOCYTES # BLD AUTO: 0.81 K/UL
MONOCYTES NFR BLD AUTO: 10.8 %
NEUTROPHILS # BLD AUTO: 4.23 K/UL
NEUTROPHILS NFR BLD AUTO: 56.3 %
NONHDLC SERPL-MCNC: 149 MG/DL
PLATELET # BLD AUTO: 178 K/UL
POTASSIUM SERPL-SCNC: 5.8 MMOL/L
PROT SERPL-MCNC: 6.8 G/DL
RBC # BLD: 4.35 M/UL
RBC # FLD: 13.8 %
SODIUM SERPL-SCNC: 139 MMOL/L
TRIGL SERPL-MCNC: 71 MG/DL
TSH SERPL-ACNC: 1.86 UIU/ML
VIT B12 SERPL-MCNC: >2000 PG/ML
WBC # FLD AUTO: 7.51 K/UL

## 2025-05-19 PROCEDURE — G2211 COMPLEX E/M VISIT ADD ON: CPT

## 2025-05-19 PROCEDURE — 99214 OFFICE O/P EST MOD 30 MIN: CPT

## 2025-05-19 RX ORDER — LOSARTAN POTASSIUM 50 MG/1
50 TABLET, FILM COATED ORAL
Qty: 90 | Refills: 0 | Status: ACTIVE | COMMUNITY
Start: 2025-05-19 | End: 1900-01-01

## 2025-05-19 RX ORDER — DICLOFENAC SODIUM 10 MG/G
1 GEL TOPICAL
Qty: 1 | Refills: 0 | Status: ACTIVE | COMMUNITY
Start: 2025-05-19 | End: 1900-01-01

## 2025-05-20 PROBLEM — M25.561 RIGHT KNEE PAIN: Status: ACTIVE | Noted: 2025-05-19

## 2025-06-10 ENCOUNTER — NON-APPOINTMENT (OUTPATIENT)
Age: 85
End: 2025-06-10

## 2025-06-16 ENCOUNTER — APPOINTMENT (OUTPATIENT)
Dept: GERIATRICS | Facility: CLINIC | Age: 85
End: 2025-06-16

## 2025-06-18 ENCOUNTER — APPOINTMENT (OUTPATIENT)
Dept: GERIATRICS | Facility: CLINIC | Age: 85
End: 2025-06-18

## 2025-06-23 ENCOUNTER — APPOINTMENT (OUTPATIENT)
Dept: GERIATRICS | Facility: CLINIC | Age: 85
End: 2025-06-23
Payer: MEDICARE

## 2025-06-23 ENCOUNTER — NON-APPOINTMENT (OUTPATIENT)
Age: 85
End: 2025-06-23

## 2025-06-23 VITALS
BODY MASS INDEX: 26.99 KG/M2 | DIASTOLIC BLOOD PRESSURE: 65 MMHG | SYSTOLIC BLOOD PRESSURE: 166 MMHG | RESPIRATION RATE: 14 BRPM | TEMPERATURE: 98.2 F | HEART RATE: 66 BPM | WEIGHT: 199 LBS | OXYGEN SATURATION: 93 %

## 2025-06-23 PROCEDURE — 99483 ASSMT & CARE PLN PT COG IMP: CPT

## 2025-06-23 RX ORDER — SERTRALINE 25 MG/1
25 TABLET, FILM COATED ORAL
Qty: 90 | Refills: 0 | Status: ACTIVE | COMMUNITY
Start: 2025-06-23 | End: 1900-01-01

## 2025-06-23 RX ORDER — DONEPEZIL HYDROCHLORIDE 5 MG/1
5 TABLET ORAL
Qty: 90 | Refills: 0 | Status: ACTIVE | COMMUNITY
Start: 2025-06-23 | End: 1900-01-01

## 2025-06-30 VITALS — SYSTOLIC BLOOD PRESSURE: 150 MMHG | DIASTOLIC BLOOD PRESSURE: 65 MMHG

## 2025-07-28 ENCOUNTER — NON-APPOINTMENT (OUTPATIENT)
Age: 85
End: 2025-07-28

## 2025-07-28 ENCOUNTER — APPOINTMENT (OUTPATIENT)
Dept: GERIATRICS | Facility: CLINIC | Age: 85
End: 2025-07-28
Payer: MEDICARE

## 2025-07-28 ENCOUNTER — APPOINTMENT (OUTPATIENT)
Dept: UROLOGY | Facility: CLINIC | Age: 85
End: 2025-07-28
Payer: MEDICARE

## 2025-07-28 VITALS
RESPIRATION RATE: 13 BRPM | HEART RATE: 69 BPM | SYSTOLIC BLOOD PRESSURE: 161 MMHG | DIASTOLIC BLOOD PRESSURE: 68 MMHG | WEIGHT: 200 LBS | OXYGEN SATURATION: 96 % | BODY MASS INDEX: 27.13 KG/M2 | TEMPERATURE: 98.4 F

## 2025-07-28 VITALS
HEART RATE: 74 BPM | DIASTOLIC BLOOD PRESSURE: 67 MMHG | WEIGHT: 195 LBS | HEIGHT: 72 IN | BODY MASS INDEX: 26.41 KG/M2 | SYSTOLIC BLOOD PRESSURE: 155 MMHG

## 2025-07-28 VITALS — DIASTOLIC BLOOD PRESSURE: 60 MMHG | SYSTOLIC BLOOD PRESSURE: 132 MMHG

## 2025-07-28 DIAGNOSIS — N40.1 BENIGN PROSTATIC HYPERPLASIA WITH LOWER URINARY TRACT SYMPMS: ICD-10-CM

## 2025-07-28 DIAGNOSIS — R97.20 ELEVATED PROSTATE, SPECIFIC ANTIGEN [PSA]: ICD-10-CM

## 2025-07-28 DIAGNOSIS — M25.561 PAIN IN RIGHT KNEE: ICD-10-CM

## 2025-07-28 DIAGNOSIS — J45.909 UNSPECIFIED ASTHMA, UNCOMPLICATED: ICD-10-CM

## 2025-07-28 DIAGNOSIS — F01.B4 VASCULAR DEMENTIA, MODERATE, WITH ANXIETY: ICD-10-CM

## 2025-07-28 DIAGNOSIS — R39.12 BENIGN PROSTATIC HYPERPLASIA WITH LOWER URINARY TRACT SYMPMS: ICD-10-CM

## 2025-07-28 DIAGNOSIS — N28.1 CYST OF KIDNEY, ACQUIRED: ICD-10-CM

## 2025-07-28 DIAGNOSIS — N32.81 OVERACTIVE BLADDER: ICD-10-CM

## 2025-07-28 DIAGNOSIS — E78.5 HYPERLIPIDEMIA, UNSPECIFIED: ICD-10-CM

## 2025-07-28 DIAGNOSIS — I10 ESSENTIAL (PRIMARY) HYPERTENSION: ICD-10-CM

## 2025-07-28 DIAGNOSIS — N39.41 URGE INCONTINENCE: ICD-10-CM

## 2025-07-28 PROCEDURE — G2211 COMPLEX E/M VISIT ADD ON: CPT

## 2025-07-28 PROCEDURE — 99215 OFFICE O/P EST HI 40 MIN: CPT

## 2025-07-28 PROCEDURE — 99214 OFFICE O/P EST MOD 30 MIN: CPT

## 2025-07-28 RX ORDER — DICLOFENAC SODIUM 3 G/100G
3 GEL TOPICAL TWICE DAILY
Qty: 1 | Refills: 2 | Status: ACTIVE | COMMUNITY
Start: 2025-07-28 | End: 1900-01-01

## 2025-07-28 RX ORDER — SOLIFENACIN SUCCINATE 5 MG/1
5 TABLET ORAL
Qty: 30 | Refills: 1 | Status: ACTIVE | COMMUNITY
Start: 2025-07-28 | End: 1900-01-01

## 2025-07-29 PROBLEM — F01.B4 MODERATE VASCULAR DEMENTIA WITH ANXIETY: Status: ACTIVE | Noted: 2025-06-23

## 2025-08-07 LAB — PSA SERPL-MCNC: 2.76 NG/ML

## 2025-08-12 ENCOUNTER — APPOINTMENT (OUTPATIENT)
Dept: ULTRASOUND IMAGING | Facility: IMAGING CENTER | Age: 85
End: 2025-08-12
Payer: MEDICARE

## 2025-08-12 ENCOUNTER — OUTPATIENT (OUTPATIENT)
Dept: OUTPATIENT SERVICES | Facility: HOSPITAL | Age: 85
LOS: 1 days | End: 2025-08-12
Payer: MEDICARE

## 2025-08-12 DIAGNOSIS — N28.1 CYST OF KIDNEY, ACQUIRED: ICD-10-CM

## 2025-08-12 PROCEDURE — 76775 US EXAM ABDO BACK WALL LIM: CPT | Mod: 26

## 2025-08-12 PROCEDURE — 76775 US EXAM ABDO BACK WALL LIM: CPT

## 2025-08-14 ENCOUNTER — RX RENEWAL (OUTPATIENT)
Age: 85
End: 2025-08-14

## 2025-08-15 ENCOUNTER — APPOINTMENT (OUTPATIENT)
Dept: UROLOGY | Facility: CLINIC | Age: 85
End: 2025-08-15

## 2025-08-15 ENCOUNTER — LABORATORY RESULT (OUTPATIENT)
Age: 85
End: 2025-08-15

## 2025-08-15 ENCOUNTER — OUTPATIENT (OUTPATIENT)
Dept: OUTPATIENT SERVICES | Facility: HOSPITAL | Age: 85
LOS: 1 days | End: 2025-08-15
Payer: MEDICARE

## 2025-08-15 VITALS — OXYGEN SATURATION: 99 % | SYSTOLIC BLOOD PRESSURE: 158 MMHG | DIASTOLIC BLOOD PRESSURE: 65 MMHG | HEART RATE: 67 BPM

## 2025-08-15 VITALS — TEMPERATURE: 98.3 F

## 2025-08-15 DIAGNOSIS — N40.1 BENIGN PROSTATIC HYPERPLASIA WITH LOWER URINARY TRACT SYMPMS: ICD-10-CM

## 2025-08-15 DIAGNOSIS — R30.0 DYSURIA: ICD-10-CM

## 2025-08-15 DIAGNOSIS — R39.12 BENIGN PROSTATIC HYPERPLASIA WITH LOWER URINARY TRACT SYMPMS: ICD-10-CM

## 2025-08-15 DIAGNOSIS — N39.41 URGE INCONTINENCE: ICD-10-CM

## 2025-08-15 DIAGNOSIS — R35.0 FREQUENCY OF MICTURITION: ICD-10-CM

## 2025-08-15 PROCEDURE — 76872 US TRANSRECTAL: CPT | Mod: 26,59

## 2025-08-15 PROCEDURE — 52000 CYSTOURETHROSCOPY: CPT

## 2025-08-15 PROCEDURE — 99212 OFFICE O/P EST SF 10 MIN: CPT | Mod: 25

## 2025-08-15 PROCEDURE — 76872 US TRANSRECTAL: CPT

## 2025-08-19 LAB
APPEARANCE: CLEAR
BILIRUBIN URINE: NEGATIVE
BLOOD URINE: ABNORMAL
COLOR: YELLOW
GLUCOSE QUALITATIVE U: NEGATIVE MG/DL
KETONES URINE: NEGATIVE MG/DL
LEUKOCYTE ESTERASE URINE: NEGATIVE
NITRITE URINE: NEGATIVE
PH URINE: 6.5
PROTEIN URINE: 100 MG/DL
SPECIFIC GRAVITY URINE: 1.01
UROBILINOGEN URINE: 0.2 MG/DL

## 2025-08-20 DIAGNOSIS — N40.1 BENIGN PROSTATIC HYPERPLASIA WITH LOWER URINARY TRACT SYMPTOMS: ICD-10-CM

## 2025-08-27 ENCOUNTER — APPOINTMENT (OUTPATIENT)
Dept: GERIATRICS | Facility: CLINIC | Age: 85
End: 2025-08-27
Payer: MEDICARE

## 2025-08-27 VITALS
TEMPERATURE: 98.4 F | DIASTOLIC BLOOD PRESSURE: 69 MMHG | OXYGEN SATURATION: 94 % | SYSTOLIC BLOOD PRESSURE: 163 MMHG | HEART RATE: 66 BPM | WEIGHT: 199 LBS | BODY MASS INDEX: 26.99 KG/M2 | RESPIRATION RATE: 18 BRPM

## 2025-08-27 VITALS — DIASTOLIC BLOOD PRESSURE: 70 MMHG | SYSTOLIC BLOOD PRESSURE: 130 MMHG

## 2025-08-27 DIAGNOSIS — F01.B4 VASCULAR DEMENTIA, MODERATE, WITH ANXIETY: ICD-10-CM

## 2025-08-27 DIAGNOSIS — E78.5 HYPERLIPIDEMIA, UNSPECIFIED: ICD-10-CM

## 2025-08-27 DIAGNOSIS — J98.4 CHRONIC OBSTRUCTIVE PULMONARY DISEASE, UNSPECIFIED: ICD-10-CM

## 2025-08-27 DIAGNOSIS — J45.909 UNSPECIFIED ASTHMA, UNCOMPLICATED: ICD-10-CM

## 2025-08-27 DIAGNOSIS — N32.81 OVERACTIVE BLADDER: ICD-10-CM

## 2025-08-27 DIAGNOSIS — I10 ESSENTIAL (PRIMARY) HYPERTENSION: ICD-10-CM

## 2025-08-27 DIAGNOSIS — L03.90 CELLULITIS, UNSPECIFIED: ICD-10-CM

## 2025-08-27 DIAGNOSIS — J44.9 CHRONIC OBSTRUCTIVE PULMONARY DISEASE, UNSPECIFIED: ICD-10-CM

## 2025-08-27 PROCEDURE — 99214 OFFICE O/P EST MOD 30 MIN: CPT | Mod: GC

## 2025-08-27 PROCEDURE — G2211 COMPLEX E/M VISIT ADD ON: CPT | Mod: GC

## 2025-08-27 RX ORDER — CEPHALEXIN 500 MG/1
500 TABLET ORAL
Qty: 14 | Refills: 0 | Status: ACTIVE | COMMUNITY
Start: 2025-08-27 | End: 1900-01-01

## 2025-08-27 RX ORDER — MUPIROCIN 2 G/100G
2 CREAM TOPICAL
Qty: 30 | Refills: 1 | Status: ACTIVE | COMMUNITY
Start: 2025-08-27 | End: 1900-01-01

## 2025-08-28 PROBLEM — L03.90 CELLULITIS: Status: ACTIVE | Noted: 2025-08-27

## 2025-08-28 PROBLEM — J44.9 MIXED RESTRICTIVE AND OBSTRUCTIVE LUNG DISEASE: Status: ACTIVE | Noted: 2017-01-16

## 2025-09-18 ENCOUNTER — RX RENEWAL (OUTPATIENT)
Age: 85
End: 2025-09-18

## 2025-09-22 PROBLEM — N39.0 RECURRENT UTI: Status: ACTIVE | Noted: 2025-09-22
